# Patient Record
Sex: FEMALE | Race: WHITE | Employment: FULL TIME | URBAN - METROPOLITAN AREA
[De-identification: names, ages, dates, MRNs, and addresses within clinical notes are randomized per-mention and may not be internally consistent; named-entity substitution may affect disease eponyms.]

---

## 2017-01-06 ENCOUNTER — ANTICOAGULATION THERAPY VISIT (OUTPATIENT)
Dept: ANTICOAGULATION | Facility: CLINIC | Age: 37
End: 2017-01-06

## 2017-01-06 DIAGNOSIS — I82.409 DEEP VEIN THROMBOSIS (DVT) (H): ICD-10-CM

## 2017-01-06 DIAGNOSIS — Z79.01 LONG-TERM (CURRENT) USE OF ANTICOAGULANTS: ICD-10-CM

## 2017-01-06 DIAGNOSIS — I82.409 DEEP VEIN THROMBOSIS (DVT) (H): Primary | ICD-10-CM

## 2017-01-06 LAB — INR PPP: 1.6 (ref 0.86–1.14)

## 2017-01-06 PROCEDURE — 85610 PROTHROMBIN TIME: CPT | Performed by: INTERNAL MEDICINE

## 2017-01-06 PROCEDURE — 36416 COLLJ CAPILLARY BLOOD SPEC: CPT | Performed by: INTERNAL MEDICINE

## 2017-01-06 NOTE — PROGRESS NOTES
ANTICOAGULATION FOLLOW-UP CLINIC VISIT    Patient Name:  Benita Olivo  Date:  1/6/2017  Contact Type:  Telephone    SUBJECTIVE:     Patient Findings     Positives Diet Changes    Comments Pt is eating more greens and would like to continue this. She reports the 3.40 INR on 12/16 she was limiting her greens, but now thinks it better to go back to her diet which has lots of greens. Pt would like to go back to 10mg daily and to recheck in a week           OBJECTIVE    INR   Date Value Ref Range Status   01/06/2017 1.60* 0.86 - 1.14 Final     Comment:     This test is intended for monitoring Coumadin therapy.  Results are not   accurate   in patients with prolonged INR due to factor deficiency.         ASSESSMENT / PLAN  INR assessment SUB    Recheck INR In: 1 WEEK    INR Location Clinic      Anticoagulation Summary as of 1/6/2017     INR goal 2.0-3.0   Selected INR 1.60! (1/6/2017)   Maintenance plan 10 mg (5 mg x 2) every day   Full instructions 10 mg every day   Weekly total 70 mg   Plan last modified Noemi Morgan RN (1/6/2017)   Next INR check 1/13/2017   Priority INR   Target end date     Indications   Deep vein thrombosis (DVT) (H) [I82.409] [I82.409]  Long-term (current) use of anticoagulants [Z79.01] [Z79.01]  Homozygous Factor V Leiden mutation (H) (Resolved) [D68.52]         Anticoagulation Episode Summary     INR check location     Preferred lab     Send INR reminders to [facility]    Comments patient contact number [phone]   name Rigo. Prefers Fridays for lab days and as infrequently as possible.      Anticoagulation Care Providers     Provider Role Specialty Phone number    Reed Garcia MD Responsible Hematology [phone]            See the Encounter Report to view Anticoagulation Flowsheet and Dosing Calendar (Go to Encounters tab in chart review, and find the Anticoagulation Therapy Visit)    Spoke with Benita Morgan, TANGELA

## 2017-01-13 ENCOUNTER — ANTICOAGULATION THERAPY VISIT (OUTPATIENT)
Dept: ANTICOAGULATION | Facility: CLINIC | Age: 37
End: 2017-01-13

## 2017-01-13 DIAGNOSIS — Z79.01 LONG-TERM (CURRENT) USE OF ANTICOAGULANTS: ICD-10-CM

## 2017-01-13 DIAGNOSIS — I82.409 DEEP VEIN THROMBOSIS (DVT) (H): Primary | ICD-10-CM

## 2017-01-13 DIAGNOSIS — I82.409 DEEP VEIN THROMBOSIS (DVT) (H): ICD-10-CM

## 2017-01-13 LAB — INR PPP: 2.3 (ref 0.86–1.14)

## 2017-01-13 PROCEDURE — 85610 PROTHROMBIN TIME: CPT | Performed by: FAMILY MEDICINE

## 2017-01-13 PROCEDURE — 36416 COLLJ CAPILLARY BLOOD SPEC: CPT | Performed by: FAMILY MEDICINE

## 2017-01-13 NOTE — PROGRESS NOTES
ANTICOAGULATION FOLLOW-UP CLINIC VISIT    Patient Name:  Benita Olivo  Date:  1/13/2017  Contact Type:  Telephone    SUBJECTIVE:        OBJECTIVE    INR   Date Value Ref Range Status   01/13/2017 2.30* 0.86 - 1.14 Final     Comment:     This test is intended for monitoring Coumadin therapy.  Results are not   accurate   in patients with prolonged INR due to factor deficiency.         ASSESSMENT / PLAN  INR assessment THER    Recheck INR In: 2 WEEKS    INR Location Clinic      Anticoagulation Summary as of 1/13/2017     INR goal 2.0-3.0   Selected INR 2.30 (1/13/2017)   Maintenance plan 10 mg (5 mg x 2) every day   Full instructions 10 mg every day   Weekly total 70 mg   Plan last modified Noemi Morgan RN (1/6/2017)   Next INR check 1/27/2017   Priority INR   Target end date     Indications   Deep vein thrombosis (DVT) (H) [I82.409] [I82.409]  Long-term (current) use of anticoagulants [Z79.01] [Z79.01]  Homozygous Factor V Leiden mutation (H) (Resolved) [D68.52]         Anticoagulation Episode Summary     INR check location     Preferred lab     Send INR reminders to [facility]    Comments patient contact number [phone]   name Rigo. Prefers Fridays for lab days and as infrequently as possible.      Anticoagulation Care Providers     Provider Role Specialty Phone number    Reed Garcia MD Responsible Hematology [phone]            See the Encounter Report to view Anticoagulation Flowsheet and Dosing Calendar (Go to Encounters tab in chart review, and find the Anticoagulation Therapy Visit)    Left message for patient with results and dosing recommendations. Asked patient to call back to report any missed doses, falls, signs and symptoms of bleeding or clotting, any changes in health, medication, or diet. Asked patient to call back with any questions or concerns.     Noemi Morgan, TANGELA

## 2017-01-27 ENCOUNTER — ANTICOAGULATION THERAPY VISIT (OUTPATIENT)
Dept: ANTICOAGULATION | Facility: CLINIC | Age: 37
End: 2017-01-27

## 2017-01-27 DIAGNOSIS — Z79.01 LONG-TERM (CURRENT) USE OF ANTICOAGULANTS: ICD-10-CM

## 2017-01-27 DIAGNOSIS — I82.409 DEEP VEIN THROMBOSIS (DVT) (H): ICD-10-CM

## 2017-01-27 DIAGNOSIS — I82.409 DEEP VEIN THROMBOSIS (DVT) (H): Primary | ICD-10-CM

## 2017-01-27 LAB — INR PPP: 1.5 (ref 0.86–1.14)

## 2017-01-27 PROCEDURE — 36416 COLLJ CAPILLARY BLOOD SPEC: CPT | Performed by: INTERNAL MEDICINE

## 2017-01-27 PROCEDURE — 85610 PROTHROMBIN TIME: CPT | Performed by: INTERNAL MEDICINE

## 2017-01-27 NOTE — PROGRESS NOTES
ANTICOAGULATION FOLLOW-UP CLINIC VISIT    Patient Name:  Benita Olivo  Date:  1/27/2017  Contact Type:  Telephone    SUBJECTIVE:     Patient Findings     Positives Missed doses           OBJECTIVE    INR   Date Value Ref Range Status   01/27/2017 1.50* 0.86 - 1.14 Final     Comment:     This test is intended for monitoring Coumadin therapy.  Results are not   accurate   in patients with prolonged INR due to factor deficiency.         ASSESSMENT / PLAN  INR assessment SUB    Recheck INR In: 2 WEEKS    INR Location Clinic      Anticoagulation Summary as of 1/27/2017     INR goal 2.0-3.0   Selected INR 1.50! (1/27/2017)   Maintenance plan 10 mg (5 mg x 2) every day   Full instructions 1/27: 15 mg; Otherwise 10 mg every day   Weekly total 70 mg   Plan last modified Noemi Morgan RN (1/6/2017)   Next INR check 2/10/2017   Priority INR   Target end date     Indications   Deep vein thrombosis (DVT) (H) [I82.409] [I82.409]  Long-term (current) use of anticoagulants [Z79.01] [Z79.01]  Homozygous Factor V Leiden mutation (H) (Resolved) [D68.52]         Anticoagulation Episode Summary     INR check location     Preferred lab     Send [facility] reminders to [facility]    Comments patient contact number [phone]   name Rigo. Prefers Fridays for lab days and as infrequently as possible.      Anticoagulation Care Providers     Provider Role Specialty Phone number    Reed Garcia MD Responsible Hematology [phone]            See the Encounter Report to view Anticoagulation Flowsheet and Dosing Calendar (Go to Encounters tab in chart review, and find the Anticoagulation Therapy Visit)    Spoke with Benita.    Christy Santana RN

## 2017-02-07 DIAGNOSIS — I82.409 DEEP VEIN THROMBOSIS (DVT) (H): Primary | ICD-10-CM

## 2017-02-07 DIAGNOSIS — Z79.01 LONG-TERM (CURRENT) USE OF ANTICOAGULANTS: ICD-10-CM

## 2017-02-07 RX ORDER — WARFARIN SODIUM 5 MG/1
TABLET ORAL
Qty: 60 TABLET | Refills: 1 | Status: SHIPPED | OUTPATIENT
Start: 2017-02-07 | End: 2017-04-05

## 2017-02-10 ENCOUNTER — ANTICOAGULATION THERAPY VISIT (OUTPATIENT)
Dept: ANTICOAGULATION | Facility: CLINIC | Age: 37
End: 2017-02-10

## 2017-02-10 DIAGNOSIS — I82.409 DEEP VEIN THROMBOSIS (DVT) (H): ICD-10-CM

## 2017-02-10 DIAGNOSIS — I82.409 DEEP VEIN THROMBOSIS (DVT) (H): Primary | ICD-10-CM

## 2017-02-10 DIAGNOSIS — Z79.01 LONG-TERM (CURRENT) USE OF ANTICOAGULANTS: ICD-10-CM

## 2017-02-10 LAB — INR PPP: 1.8 (ref 0.86–1.14)

## 2017-02-10 PROCEDURE — 85610 PROTHROMBIN TIME: CPT | Performed by: INTERNAL MEDICINE

## 2017-02-10 PROCEDURE — 36416 COLLJ CAPILLARY BLOOD SPEC: CPT | Performed by: INTERNAL MEDICINE

## 2017-02-10 NOTE — PROGRESS NOTES
ANTICOAGULATION FOLLOW-UP CLINIC VISIT    Patient Name:  Benita Olivo  Date:  2/10/2017  Contact Type:  Telephone    SUBJECTIVE:     Patient Findings     Positives Missed doses           OBJECTIVE    INR   Date Value Ref Range Status   02/10/2017 1.80* 0.86 - 1.14 Final     Comment:     This test is intended for monitoring Coumadin therapy.  Results are not   accurate   in patients with prolonged INR due to factor deficiency.         ASSESSMENT / PLAN  INR assessment SUB    Recheck INR In: 1 WEEK    INR Location Clinic      Anticoagulation Summary as of 2/10/2017     INR goal 2.0-3.0   Selected INR 1.80! (2/10/2017)   Maintenance plan 15 mg (5 mg x 3) on Tue; 10 mg (5 mg x 2) all other days   Full instructions 2/10: 15 mg; Otherwise 15 mg on Tue; 10 mg all other days   Weekly total 75 mg   Plan last modified Thais Weiss, RN (2/10/2017)   Next INR check 2/17/2017   Priority INR   Target end date     Indications   Deep vein thrombosis (DVT) (H) [I82.409] [I82.409]  Long-term (current) use of anticoagulants [Z79.01] [Z79.01]  Homozygous Factor V Leiden mutation (H) (Resolved) [D68.52]         Anticoagulation Episode Summary     INR check location     Preferred lab     Send INR reminders to [facility]    Comments patient contact number [phone]   name Rigo. Prefers Fridays for lab days and as infrequently as possible.      Anticoagulation Care Providers     Provider Role Specialty Phone number    Reed Garcia MD Responsible Hematology [phone]            See the Encounter Report to view Anticoagulation Flowsheet and Dosing Calendar (Go to Encounters tab in chart review, and find the Anticoagulation Therapy Visit)    Spoke with Benita. She missed her dose on Monday, so took 15 mg on Tuesday. She is going to start a prenatal multivitamin today, so will give one larger dose during the coming week to account for the increase in vitamin K. She will check her INR in one  week.    Thais Weiss RN

## 2017-02-17 ENCOUNTER — ANTICOAGULATION THERAPY VISIT (OUTPATIENT)
Dept: ANTICOAGULATION | Facility: CLINIC | Age: 37
End: 2017-02-17

## 2017-02-17 DIAGNOSIS — Z79.01 LONG-TERM (CURRENT) USE OF ANTICOAGULANTS: ICD-10-CM

## 2017-02-17 DIAGNOSIS — I82.409 DEEP VEIN THROMBOSIS (DVT) (H): ICD-10-CM

## 2017-02-17 LAB — INR PPP: 2.4 (ref 0.86–1.14)

## 2017-02-17 PROCEDURE — 85610 PROTHROMBIN TIME: CPT | Performed by: INTERNAL MEDICINE

## 2017-02-17 PROCEDURE — 36416 COLLJ CAPILLARY BLOOD SPEC: CPT | Performed by: INTERNAL MEDICINE

## 2017-02-17 NOTE — PROGRESS NOTES
ANTICOAGULATION FOLLOW-UP CLINIC VISIT    Patient Name:  Benita Olivo  Date:  2/17/2017  Contact Type:  Telephone    SUBJECTIVE:     Patient Findings     Positives No Problem Findings           OBJECTIVE    INR   Date Value Ref Range Status   02/17/2017 2.40 (H) 0.86 - 1.14 Final     Comment:     This test is intended for monitoring Coumadin therapy.  Results are not   accurate   in patients with prolonged INR due to factor deficiency.         ASSESSMENT / PLAN  INR assessment THER    Recheck INR In: 2 WEEKS    INR Location Clinic      Anticoagulation Summary as of 2/17/2017     INR goal 2.0-3.0   Today's INR 2.40   Maintenance plan 15 mg (5 mg x 3) on Tue; 10 mg (5 mg x 2) all other days   Full instructions 15 mg on Tue; 10 mg all other days   Weekly total 75 mg   Plan last modified Thais Weiss RN (2/10/2017)   Next INR check 3/3/2017   Priority INR   Target end date     Indications   Deep vein thrombosis (DVT) (H) [I82.409] [I82.409]  Long-term (current) use of anticoagulants [Z79.01] [Z79.01]  Homozygous Factor V Leiden mutation (H) (Resolved) [D68.52]         Anticoagulation Episode Summary     INR check location     Preferred lab     [facility]    Comments patient contact number [phone]   name Rigo. Prefers Fridays for lab days and as infrequently as possible.      Anticoagulation Care Providers     Provider Role Specialty Phone number    Reed Garcia MD Responsible Hematology [phone]            See the Encounter Report to view Anticoagulation Flowsheet and Dosing Calendar (Go to Encounters tab in chart review, and find the Anticoagulation Therapy Visit)    Spoke with Benita.    Thais Weiss, RN

## 2017-03-03 ENCOUNTER — ANTICOAGULATION THERAPY VISIT (OUTPATIENT)
Dept: ANTICOAGULATION | Facility: CLINIC | Age: 37
End: 2017-03-03

## 2017-03-03 DIAGNOSIS — I82.409 DEEP VEIN THROMBOSIS (DVT) (H): ICD-10-CM

## 2017-03-03 DIAGNOSIS — Z79.01 LONG-TERM (CURRENT) USE OF ANTICOAGULANTS: ICD-10-CM

## 2017-03-03 LAB — INR PPP: 1.8 (ref 0.86–1.14)

## 2017-03-03 PROCEDURE — 36416 COLLJ CAPILLARY BLOOD SPEC: CPT | Performed by: FAMILY MEDICINE

## 2017-03-03 PROCEDURE — 85610 PROTHROMBIN TIME: CPT | Performed by: FAMILY MEDICINE

## 2017-03-03 NOTE — MR AVS SNAPSHOT
Benita Olivo   3/3/2017   Anticoagulation Therapy Visit    Description:  36 year old female   Provider:  Thais Weiss, RN   Department:  [facility]           INR as of 3/3/2017     Today's INR 1.80!      Anticoagulation Summary as of 3/3/2017     INR goal 2.0-3.0   Today's INR 1.80!   Full instructions 15 mg on Tue, Fri; 10 mg all other days   Next INR check 3/17/2017    Indications   Deep vein thrombosis (DVT) (H) [I82.409] [I82.409]  Long-term (current) use of anticoagulants [Z79.01] [Z79.01]  Homozygous Factor V Leiden mutation (H) (Resolved) [D68.52]         March 2017 Details    Sun Mon Tue Wed Thu Fri Sat        1               2               3      15 mg   See details      4      10 mg           5      10 mg         6      10 mg         7      15 mg         8      10 mg         9      10 mg         10      15 mg         11      10 mg           12      10 mg         13      10 mg         14      15 mg         15      10 mg         16      10 mg         17            18                 19               20               21               22               23               24               25                 26               27               28               29               30               31                 Date Details   03/03 This INR check       Date of next INR:  3/17/2017         How to take your warfarin dose     To take:  10 mg Take 2 of the 5 mg tablets.    To take:  15 mg Take 3 of the 5 mg tablets.

## 2017-03-03 NOTE — PROGRESS NOTES
ANTICOAGULATION FOLLOW-UP CLINIC VISIT    Patient Name:  Benita Olivo  Date:  3/3/2017  Contact Type:  Telephone    SUBJECTIVE:        OBJECTIVE    INR   Date Value Ref Range Status   03/03/2017 1.80 (H) 0.86 - 1.14 Final     Comment:     This test is intended for monitoring Coumadin therapy.  Results are not   accurate   in patients with prolonged INR due to factor deficiency.         ASSESSMENT / PLAN  INR assessment SUB    Recheck INR In: 2 WEEKS    INR Location Clinic      Anticoagulation Summary as of 3/3/2017     INR goal 2.0-3.0   Today's INR 1.80!   Maintenance plan 15 mg (5 mg x 3) on Tue, Fri; 10 mg (5 mg x 2) all other days   Full instructions 15 mg on Tue, Fri; 10 mg all other days   Weekly total 80 mg   Plan last modified Thais Weiss RN (3/3/2017)   Next INR check 3/17/2017   Priority INR   Target end date     Indications   Deep vein thrombosis (DVT) (H) [I82.409] [I82.409]  Long-term (current) use of anticoagulants [Z79.01] [Z79.01]  Homozygous Factor V Leiden mutation (H) (Resolved) [D68.52]         Anticoagulation Episode Summary     INR check location     Preferred lab     Send INR reminders to [facility]    Comments patient contact number [phone]   name Rigo. Prefers Fridays for lab days and as infrequently as possible.      Anticoagulation Care Providers     Provider Role Specialty Phone number    Reed Garcia MD Responsible Hematology [phone]            See the Encounter Report to view Anticoagulation Flowsheet and Dosing Calendar (Go to Encounters tab in chart review, and find the Anticoagulation Therapy Visit)    Left message with results and dosing recommendations. Asked patient to call back to report any missed doses, falls, signs and symptoms of bleeding or clotting, or any changes to health or diet.       Thais Weiss, TANGELA

## 2017-03-21 ENCOUNTER — ANTICOAGULATION THERAPY VISIT (OUTPATIENT)
Dept: ANTICOAGULATION | Facility: CLINIC | Age: 37
End: 2017-03-21

## 2017-03-21 DIAGNOSIS — I82.409 DEEP VEIN THROMBOSIS (DVT) (H): ICD-10-CM

## 2017-03-21 DIAGNOSIS — Z79.01 LONG-TERM (CURRENT) USE OF ANTICOAGULANTS: ICD-10-CM

## 2017-03-21 LAB — INR PPP: 2.4 (ref 0.86–1.14)

## 2017-03-21 PROCEDURE — 85610 PROTHROMBIN TIME: CPT | Performed by: FAMILY MEDICINE

## 2017-03-21 PROCEDURE — 36416 COLLJ CAPILLARY BLOOD SPEC: CPT | Performed by: FAMILY MEDICINE

## 2017-03-21 NOTE — MR AVS SNAPSHOT
Benita POSEY Geovani   3/21/2017   Anticoagulation Therapy Visit    Description:  36 year old female   Provider:  Noemi Morgan, RN   Department:  [facility]           INR as of 3/21/2017     Today's INR 2.40      Anticoagulation Summary as of 3/21/2017     INR goal 2.0-3.0   Today's INR 2.40   Full instructions 15 mg on Tue, Fri; 10 mg all other days   Next INR check 4/4/2017    Indications   Deep vein thrombosis (DVT) (H) [I82.409] [I82.409]  Long-term (current) use of anticoagulants [Z79.01] [Z79.01]  Homozygous Factor V Leiden mutation (H) (Resolved) [D68.52]         March 2017 Details    Sun Mon Tue Wed Thu Fri Sat        1               2               3               4                 5               6               7               8               9               10               11                 12               13               14               15               16               17               18                 19               20               21      15 mg   See details      22      10 mg         23      10 mg         24      15 mg         25      10 mg           26      10 mg         27      10 mg         28      15 mg         29      10 mg         30      10 mg         31      15 mg           Date Details   03/21 This INR check               How to take your warfarin dose     To take:  10 mg Take 2 of the 5 mg tablets.    To take:  15 mg Take 3 of the 5 mg tablets.           April 2017 Details    Sun Mon Tue Wed Thu Fri Sat           1      10 mg           2      10 mg         3      10 mg         4            5               6               7               8                 9               10               11               12               13               14               15                 16               17               18               19               20               21               22                 23               24               25               26               27                28               29                 30                      Date Details   No additional details    Date of next INR:  4/4/2017         How to take your warfarin dose     To take:  10 mg Take 2 of the 5 mg tablets.    To take:  15 mg Take 3 of the 5 mg tablets.

## 2017-03-21 NOTE — PROGRESS NOTES
ANTICOAGULATION FOLLOW-UP CLINIC VISIT    Patient Name:  Benita Olivo  Date:  3/21/2017  Contact Type:  Telephone    SUBJECTIVE:        OBJECTIVE    INR   Date Value Ref Range Status   03/21/2017 2.40 (H) 0.86 - 1.14 Final     Comment:     This test is intended for monitoring Coumadin therapy.  Results are not   accurate   in patients with prolonged INR due to factor deficiency.         ASSESSMENT / PLAN  INR assessment THER    Recheck INR In: 2 WEEKS    INR Location Clinic      Anticoagulation Summary as of 3/21/2017     INR goal 2.0-3.0   Today's INR 2.40   Maintenance plan 15 mg (5 mg x 3) on Tue, Fri; 10 mg (5 mg x 2) all other days   Full instructions 15 mg on Tue, Fri; 10 mg all other days   Weekly total 80 mg   Plan last modified Thais Weiss RN (3/3/2017)   Next INR check 4/4/2017   Priority INR   Target end date     Indications   Deep vein thrombosis (DVT) (H) [I82.409] [I82.409]  Long-term (current) use of anticoagulants [Z79.01] [Z79.01]  Homozygous Factor V Leiden mutation (H) (Resolved) [D68.52]         Anticoagulation Episode Summary     INR check location     Preferred lab     Send INR reminders to [facility]    Comments patient contact number [phone]   name Rigo. Prefers Fridays for lab days and as infrequently as possible.      Anticoagulation Care Providers     Provider Role Specialty Phone number    Reed Garcia MD Responsible Hematology [phone]            See the Encounter Report to view Anticoagulation Flowsheet and Dosing Calendar (Go to Encounters tab in chart review, and find the Anticoagulation Therapy Visit)    Left message for patient with results and dosing recommendations. Asked patient to call back to report any missed doses, falls, signs and symptoms of bleeding or clotting, any changes in health, medication, or diet. Asked patient to call back with any questions or concerns.     Noemi Morgan RN

## 2017-04-05 DIAGNOSIS — Z79.01 LONG-TERM (CURRENT) USE OF ANTICOAGULANTS: ICD-10-CM

## 2017-04-05 DIAGNOSIS — I82.409 DEEP VEIN THROMBOSIS (DVT) (H): ICD-10-CM

## 2017-04-05 RX ORDER — WARFARIN SODIUM 5 MG/1
TABLET ORAL
Qty: 70 TABLET | Refills: 5 | Status: SHIPPED | OUTPATIENT
Start: 2017-04-05 | End: 2017-11-15

## 2017-04-07 ENCOUNTER — ANTICOAGULATION THERAPY VISIT (OUTPATIENT)
Dept: ANTICOAGULATION | Facility: CLINIC | Age: 37
End: 2017-04-07

## 2017-04-07 DIAGNOSIS — Z79.01 LONG-TERM (CURRENT) USE OF ANTICOAGULANTS: ICD-10-CM

## 2017-04-07 DIAGNOSIS — I82.409 DEEP VEIN THROMBOSIS (DVT) (H): ICD-10-CM

## 2017-04-07 LAB — INR PPP: 2.6 (ref 0.86–1.14)

## 2017-04-07 PROCEDURE — 85610 PROTHROMBIN TIME: CPT | Performed by: FAMILY MEDICINE

## 2017-04-07 PROCEDURE — 36416 COLLJ CAPILLARY BLOOD SPEC: CPT | Performed by: FAMILY MEDICINE

## 2017-04-07 NOTE — PROGRESS NOTES
ANTICOAGULATION FOLLOW-UP CLINIC VISIT    Patient Name:  Benita Olivo  Date:  4/7/2017  Contact Type:  Telephone    SUBJECTIVE:     Patient Findings     Positives Change in medications    Comments Pt just started Provera on 4/5 Micromedex says a moderate interaction with Warfarin. Will have pt come in two weeks again to see if there is any changes or not.            OBJECTIVE    INR   Date Value Ref Range Status   04/07/2017 2.60 (H) 0.86 - 1.14 Final     Comment:     This test is intended for monitoring Coumadin therapy.  Results are not   accurate   in patients with prolonged INR due to factor deficiency.         ASSESSMENT / PLAN  INR assessment THER    Recheck INR In: 2 WEEKS    INR Location Clinic      Anticoagulation Summary as of 4/7/2017     INR goal 2.0-3.0   Today's INR 2.60   Maintenance plan 15 mg (5 mg x 3) on Tue, Fri; 10 mg (5 mg x 2) all other days   Full instructions 15 mg on Tue, Fri; 10 mg all other days   Weekly total 80 mg   Plan last modified Thais Weiss RN (3/3/2017)   Next INR check 4/21/2017   Priority INR   Target end date     Indications   Deep vein thrombosis (DVT) (H) [I82.409] [I82.409]  Long-term (current) use of anticoagulants [Z79.01] [Z79.01]  Homozygous Factor V Leiden mutation (H) (Resolved) [D68.52]         Anticoagulation Episode Summary     INR check location     Preferred lab     Send INR reminders to [facility]    Comments patient contact number [phone]   name Rigo. Prefers Fridays for lab days and as infrequently as possible.      Anticoagulation Care Providers     Provider Role Specialty Phone number    Reed Garcia MD Responsible Hematology [phone]            See the Encounter Report to view Anticoagulation Flowsheet and Dosing Calendar (Go to Encounters tab in chart review, and find the Anticoagulation Therapy Visit)    Spoke with Benita Morgan RN

## 2017-04-07 NOTE — MR AVS SNAPSHOT
Benita POSEY Olukenyatta   4/7/2017   Anticoagulation Therapy Visit    Description:  36 year old female   Provider:  Noemi Morgan, RN   Department:  [facility]           INR as of 4/7/2017     Today's INR 2.60      Anticoagulation Summary as of 4/7/2017     INR goal 2.0-3.0   Today's INR 2.60   Full instructions 15 mg on Tue, Fri; 10 mg all other days   Next INR check 4/21/2017    Indications   Deep vein thrombosis (DVT) (H) [I82.409] [I82.409]  Long-term (current) use of anticoagulants [Z79.01] [Z79.01]  Homozygous Factor V Leiden mutation (H) (Resolved) [D68.52]         April 2017 Details    Sun Mon Tue Wed Thu Fri Sat           1                 2               3               4               5               6               7      15 mg   See details      8      10 mg           9      10 mg         10      10 mg         11      15 mg         12      10 mg         13      10 mg         14      15 mg         15      10 mg           16      10 mg         17      10 mg         18      15 mg         19      10 mg         20      10 mg         21            22                 23               24               25               26               27               28               29                 30                      Date Details   04/07 This INR check       Date of next INR:  4/21/2017         How to take your warfarin dose     To take:  10 mg Take 2 of the 5 mg tablets.    To take:  15 mg Take 3 of the 5 mg tablets.

## 2017-04-14 ENCOUNTER — TRANSFERRED RECORDS (OUTPATIENT)
Dept: HEALTH INFORMATION MANAGEMENT | Facility: CLINIC | Age: 37
End: 2017-04-14

## 2017-04-19 ENCOUNTER — TELEPHONE (OUTPATIENT)
Dept: HEMATOLOGY | Facility: CLINIC | Age: 37
End: 2017-04-19

## 2017-04-19 NOTE — TELEPHONE ENCOUNTER
The Center received a call from staff (Luzmaria Matias RN) at [facility] regarding Mrs. Olivo, a 36 year old woman with history of recurrent UE DVT and pulmonary embolism who is homozygote for Factor V Leiden on long term anticoagulation with Coumadin. The plan is to proceed with FET. To facilitate the process she will begin estrogen at the beginning of her next menstrual cycle and continue with estrogen in hopes of establishing a pregnancy.   I spoke with Dr. Garcia regarding the plan and as long as she is therapeutic on Coumadin (INR target range 2-3) she can proceed with that plan. Once pregnancy is confirmed then anticoagulation should be changed to enoxaparin at 1 mg/kg every 12 hours subcutaneously. After starting enoxaparin a heparin Xa level should be checked (after 5 doses ) for assurance that she is therapeutic.    I called Mrs. Olivo to discuss the plan. She agreed to have her INR checked 2 days prior to beginning the supplemental estrogen and weekly thereafter for the duration of the increased estrogen exposure for assurance that her anticoagulation remains therapeutic on the estrogen. She is aware of the need to change anticoagulants once pregnancy is confirmed. She will call the Center and let us know. We wished her success on her plan.

## 2017-04-21 ENCOUNTER — ANTICOAGULATION THERAPY VISIT (OUTPATIENT)
Dept: ANTICOAGULATION | Facility: CLINIC | Age: 37
End: 2017-04-21

## 2017-04-21 DIAGNOSIS — Z79.01 LONG-TERM (CURRENT) USE OF ANTICOAGULANTS: ICD-10-CM

## 2017-04-21 DIAGNOSIS — I82.409 DEEP VEIN THROMBOSIS (DVT) (H): ICD-10-CM

## 2017-04-21 LAB — INR PPP: 2.3 (ref 0.86–1.14)

## 2017-04-21 PROCEDURE — 36416 COLLJ CAPILLARY BLOOD SPEC: CPT | Performed by: INTERNAL MEDICINE

## 2017-04-21 PROCEDURE — 85610 PROTHROMBIN TIME: CPT | Performed by: INTERNAL MEDICINE

## 2017-04-21 NOTE — PROGRESS NOTES
ANTICOAGULATION FOLLOW-UP CLINIC VISIT    Patient Name:  Benita Olivo  Date:  4/21/2017  Contact Type:  Telephone    SUBJECTIVE:     Patient Findings     Positives Change in medications    Comments Pt is going to start estrogen pill 4/27 in regards to starting FET. Per Dr Garcia pt to test INR once a week and to keep INR 2.00-3.00. Once pregnant will need to stop Warfarin. This info is documented in Ann Kohli. RN note 4/19/2017. Pt communicated this too           OBJECTIVE    INR   Date Value Ref Range Status   04/21/2017 2.30 (H) 0.86 - 1.14 Final     Comment:     This test is intended for monitoring Coumadin therapy.  Results are not   accurate   in patients with prolonged INR due to factor deficiency.         ASSESSMENT / PLAN  INR assessment THER    Recheck INR In: 4 DAYS    INR Location Clinic      Anticoagulation Summary as of 4/21/2017     INR goal 2.0-3.0   Today's INR 2.30   Maintenance plan 15 mg (5 mg x 3) on Tue, Fri; 10 mg (5 mg x 2) all other days   Full instructions 15 mg on Tue, Fri; 10 mg all other days   Weekly total 80 mg   Plan last modified Thais Weiss, RN (3/3/2017)   Next INR check 4/25/2017   Priority INR   Target end date     Indications   Deep vein thrombosis (DVT) (H) [I82.409] [I82.409]  Long-term (current) use of anticoagulants [Z79.01] [Z79.01]  Homozygous Factor V Leiden mutation (H) (Resolved) [D68.52]         Anticoagulation Episode Summary     INR check location     Preferred lab     Send INR reminders to [facility]    Comments patient contact number [phone]   name Rigo. Prefers Fridays for lab days and as infrequently as possible.      Anticoagulation Care Providers     Provider Role Specialty Phone number    Reed Garcia MD Responsible Hematology [phone]            See the Encounter Report to view Anticoagulation Flowsheet and Dosing Calendar (Go to Encounters tab in chart review, and find the Anticoagulation Therapy  Visit)    Spoke with patient. Gave them their lab results and new warfarin recommendation.  No changes in health, medication, or diet. No missed doses, no falls. No signs or symptoms of bleed or clotting.    Noemi Morgan RN

## 2017-04-21 NOTE — MR AVS SNAPSHOT
Benita POSEY Geovani   4/21/2017   Anticoagulation Therapy Visit    Description:  36 year old female   Provider:  Noemi Morgan, RN   Department:  [facility]           INR as of 4/21/2017     Today's INR 2.30      Anticoagulation Summary as of 4/21/2017     INR goal 2.0-3.0   Today's INR 2.30   Full instructions 15 mg on Tue, Fri; 10 mg all other days   Next INR check 4/25/2017    Indications   Deep vein thrombosis (DVT) (H) [I82.409] [I82.409]  Long-term (current) use of anticoagulants [Z79.01] [Z79.01]  Homozygous Factor V Leiden mutation (H) (Resolved) [D68.52]         April 2017 Details    Sun Mon Tue Wed Thu Fri Sat           1                 2               3               4               5               6               7               8                 9               10               11               12               13               14               15                 16               17               18               19               20               21      15 mg   See details      22      10 mg           23      10 mg         24      10 mg         25            26               27               28               29                 30                      Date Details   04/21 This INR check       Date of next INR:  4/25/2017         How to take your warfarin dose     To take:  10 mg Take 2 of the 5 mg tablets.    To take:  15 mg Take 3 of the 5 mg tablets.

## 2017-04-25 ENCOUNTER — ANTICOAGULATION THERAPY VISIT (OUTPATIENT)
Dept: ANTICOAGULATION | Facility: CLINIC | Age: 37
End: 2017-04-25

## 2017-04-25 DIAGNOSIS — Z79.01 LONG-TERM (CURRENT) USE OF ANTICOAGULANTS: ICD-10-CM

## 2017-04-25 DIAGNOSIS — I82.409 DEEP VEIN THROMBOSIS (DVT) (H): ICD-10-CM

## 2017-04-25 LAB — INR PPP: 2.3 (ref 0.86–1.14)

## 2017-04-25 PROCEDURE — 36416 COLLJ CAPILLARY BLOOD SPEC: CPT | Performed by: FAMILY MEDICINE

## 2017-04-25 PROCEDURE — 85610 PROTHROMBIN TIME: CPT | Performed by: FAMILY MEDICINE

## 2017-04-25 NOTE — PROGRESS NOTES
ANTICOAGULATION FOLLOW-UP CLINIC VISIT    Patient Name:  Benita Olivo  Date:  4/25/2017  Contact Type:  Telephone    SUBJECTIVE:     Patient Findings     Comments INR's will be done weekly until patient is pregnant.           OBJECTIVE    INR   Date Value Ref Range Status   04/25/2017 2.30 (H) 0.86 - 1.14 Final     Comment:     This test is intended for monitoring Coumadin therapy.  Results are not   accurate   in patients with prolonged INR due to factor deficiency.         ASSESSMENT / PLAN  INR assessment THER    Recheck INR In: 1 WEEK    INR Location Clinic      Anticoagulation Summary as of 4/25/2017     INR goal 2.0-3.0   Today's INR 2.30   Maintenance plan 15 mg (5 mg x 3) on Tue, Fri; 10 mg (5 mg x 2) all other days   Full instructions 15 mg on Tue, Fri; 10 mg all other days   Weekly total 80 mg   No change documented Christy Santana RN   Plan last modified Thais Weiss RN (3/3/2017)   Next INR check 5/2/2017   Priority INR   Target end date     Indications   Deep vein thrombosis (DVT) (H) [I82.409] [I82.409]  Long-term (current) use of anticoagulants [Z79.01] [Z79.01]  Homozygous Factor V Leiden mutation (H) (Resolved) [D68.52]         Anticoagulation Episode Summary     INR check location     Preferred lab     Send INR reminders to [facility]    Comments patient contact number [phone]   name Rigo. Prefers Fridays for lab days and as infrequently as possible.      Anticoagulation Care Providers     Provider Role Specialty Phone number    Reed Garcia MD Responsible Hematology [phone]            See the Encounter Report to view Anticoagulation Flowsheet and Dosing Calendar (Go to Encounters tab in chart review, and find the Anticoagulation Therapy Visit)    Left message for patient with results and dosing recommendations. Asked patient to call back to report any missed doses, falls, signs and symptoms of bleeding or clotting, any changes in health,  medication, or diet. Asked patient to call back with any questions or concerns.     Christy Santana RN

## 2017-04-25 NOTE — MR AVS SNAPSHOT
Benita TATY Olivo   4/25/2017   Anticoagulation Therapy Visit    Description:  36 year old female   Provider:  Christy Santana, RN   Department:  [facility]           INR as of 4/25/2017     Today's INR 2.30      Anticoagulation Summary as of 4/25/2017     INR goal 2.0-3.0   Today's INR 2.30   Full instructions 15 mg on Tue, Fri; 10 mg all other days   Next INR check 5/2/2017    Indications   Deep vein thrombosis (DVT) (H) [I82.409] [I82.409]  Long-term (current) use of anticoagulants [Z79.01] [Z79.01]  Homozygous Factor V Leiden mutation (H) (Resolved) [D68.52]         April 2017 Details    Sun Mon Tue Wed Thu Fri Sat           1                 2               3               4               5               6               7               8                 9               10               11               12               13               14               15                 16               17               18               19               20               21               22                 23               24               25      15 mg   See details      26      10 mg         27      10 mg         28      15 mg         29      10 mg           30      10 mg                Date Details   04/25 This INR check               How to take your warfarin dose     To take:  10 mg Take 2 of the 5 mg tablets.    To take:  15 mg Take 3 of the 5 mg tablets.           May 2017 Details    Sun Mon Tue Wed Thu Fri Sat      1      10 mg         2            3               4               5               6                 7               8               9               10               11               12               13                 14               15               16               17               18               19               20                 21               22               23               24               25               26               27                 28               29               30                31                   Date Details   No additional details    Date of next INR:  5/2/2017         How to take your warfarin dose     To take:  10 mg Take 2 of the 5 mg tablets.    To take:  15 mg Take 3 of the 5 mg tablets.

## 2017-05-01 ENCOUNTER — ANTICOAGULATION THERAPY VISIT (OUTPATIENT)
Dept: ANTICOAGULATION | Facility: CLINIC | Age: 37
End: 2017-05-01

## 2017-05-01 DIAGNOSIS — Z79.01 LONG-TERM (CURRENT) USE OF ANTICOAGULANTS: ICD-10-CM

## 2017-05-01 DIAGNOSIS — I82.409 DEEP VEIN THROMBOSIS (DVT) (H): ICD-10-CM

## 2017-05-01 LAB — INR PPP: 3.3 (ref 0.86–1.14)

## 2017-05-01 PROCEDURE — 36416 COLLJ CAPILLARY BLOOD SPEC: CPT | Performed by: INTERNAL MEDICINE

## 2017-05-01 PROCEDURE — 85610 PROTHROMBIN TIME: CPT | Performed by: INTERNAL MEDICINE

## 2017-05-01 NOTE — PROGRESS NOTES
ANTICOAGULATION FOLLOW-UP CLINIC VISIT    Patient Name:  Benita Olivo  Date:  5/1/2017  Contact Type:  Telephone    SUBJECTIVE:     Patient Findings     Positives Med error    Comments Pt suspects she took 15 mg three days in the past week           OBJECTIVE    INR   Date Value Ref Range Status   05/01/2017 3.30 (H) 0.86 - 1.14 Final     Comment:     This test is intended for monitoring Coumadin therapy.  Results are not   accurate   in patients with prolonged INR due to factor deficiency.         ASSESSMENT / PLAN  INR assessment SUPRA    Recheck INR In: 1 WEEK    INR Location Clinic      Anticoagulation Summary as of 5/1/2017     INR goal 2.0-3.0   Today's INR 3.30!   Maintenance plan 15 mg (5 mg x 3) on Tue, Fri; 10 mg (5 mg x 2) all other days   Full instructions 5/1: 7.5 mg; Otherwise 15 mg on Tue, Fri; 10 mg all other days   Weekly total 80 mg   Plan last modified Thais Weiss RN (3/3/2017)   Next INR check 5/8/2017   Priority INR   Target end date     Indications   Deep vein thrombosis (DVT) (H) [I82.409] [I82.409]  Long-term (current) use of anticoagulants [Z79.01] [Z79.01]  Homozygous Factor V Leiden mutation (H) (Resolved) [D68.52]         Anticoagulation Episode Summary     INR check location     Preferred lab     Send INR reminders to [facility]    Comments patient contact number [phone]   name Rigo. Prefers Fridays for lab days and as infrequently as possible.      Anticoagulation Care Providers     Provider Role Specialty Phone number    Reed aGrcia MD Responsible Hematology [phone]            See the Encounter Report to view Anticoagulation Flowsheet and Dosing Calendar (Go to Encounters tab in chart review, and find the Anticoagulation Therapy Visit)    Spoke with Benita.  One time dose adjustment done today only to compensate for med error last week.     Leyla Navarro RN

## 2017-05-01 NOTE — MR AVS SNAPSHOT
Benita POSEY Geovani   5/1/2017   Anticoagulation Therapy Visit    Description:  36 year old female   Provider:  Leyla Navarro, RN   Department:  [facility]           INR as of 5/1/2017     Today's INR 3.30!      Anticoagulation Summary as of 5/1/2017     INR goal 2.0-3.0   Today's INR 3.30!   Full instructions 5/1: 7.5 mg; Otherwise 15 mg on Tue, Fri; 10 mg all other days   Next INR check 5/8/2017    Indications   Deep vein thrombosis (DVT) (H) [I82.409] [I82.409]  Long-term (current) use of anticoagulants [Z79.01] [Z79.01]  Homozygous Factor V Leiden mutation (H) (Resolved) [D68.52]         May 2017 Details    Sun Mon Tue Wed Thu Fri Sat      1      7.5 mg   See details      2      15 mg         3      10 mg         4      10 mg         5      15 mg         6      10 mg           7      10 mg         8            9               10               11               12               13                 14               15               16               17               18               19               20                 21               22               23               24               25               26               27                 28               29               30               31                   Date Details   05/01 This INR check       Date of next INR:  5/8/2017         How to take your warfarin dose     To take:  7.5 mg Take 1.5 of the 5 mg tablets.    To take:  10 mg Take 2 of the 5 mg tablets.    To take:  15 mg Take 3 of the 5 mg tablets.

## 2017-05-08 ENCOUNTER — ANTICOAGULATION THERAPY VISIT (OUTPATIENT)
Dept: ANTICOAGULATION | Facility: CLINIC | Age: 37
End: 2017-05-08

## 2017-05-08 DIAGNOSIS — Z79.01 LONG-TERM (CURRENT) USE OF ANTICOAGULANTS: ICD-10-CM

## 2017-05-08 DIAGNOSIS — I82.409 DEEP VEIN THROMBOSIS (DVT) (H): ICD-10-CM

## 2017-05-08 LAB — INR PPP: 2.9 (ref 0.86–1.14)

## 2017-05-08 PROCEDURE — 85610 PROTHROMBIN TIME: CPT | Performed by: FAMILY MEDICINE

## 2017-05-08 PROCEDURE — 36416 COLLJ CAPILLARY BLOOD SPEC: CPT | Performed by: FAMILY MEDICINE

## 2017-05-08 NOTE — MR AVS SNAPSHOT
Benita POSEY Olukenyatta   5/8/2017   Anticoagulation Therapy Visit    Description:  36 year old female   Provider:  Thais Deng RN   Department:  [facility]           INR as of 5/8/2017     Today's INR 2.90      Anticoagulation Summary as of 5/8/2017     INR goal 2.0-3.0   Today's INR 2.90   Full instructions 15 mg on Tue, Fri; 10 mg all other days   Next INR check 5/15/2017    Indications   Deep vein thrombosis (DVT) (H) [I82.409] [I82.409]  Long-term (current) use of anticoagulants [Z79.01] [Z79.01]  Homozygous Factor V Leiden mutation (H) (Resolved) [D68.52]         May 2017 Details    Sun Mon Tue Wed Thu Fri Sat      1               2               3               4               5               6                 7               8      10 mg   See details      9      15 mg         10      10 mg         11      10 mg         12      15 mg         13      10 mg           14      10 mg         15            16               17               18               19               20                 21               22               23               24               25               26               27                 28               29               30               31                   Date Details   05/08 This INR check       Date of next INR:  5/15/2017         How to take your warfarin dose     To take:  10 mg Take 2 of the 5 mg tablets.    To take:  15 mg Take 3 of the 5 mg tablets.

## 2017-05-08 NOTE — PROGRESS NOTES
ANTICOAGULATION FOLLOW-UP CLINIC VISIT    Patient Name:  Benita Olivo  Date:  5/8/2017  Contact Type:  Telephone    SUBJECTIVE:     Patient Findings     Positives No Problem Findings           OBJECTIVE    INR   Date Value Ref Range Status   05/08/2017 2.90 (H) 0.86 - 1.14 Final     Comment:     This test is intended for monitoring Coumadin therapy.  Results are not   accurate   in patients with prolonged INR due to factor deficiency.         ASSESSMENT / PLAN  INR assessment THER    Recheck INR In: 1 WEEK    INR Location Clinic      Anticoagulation Summary as of 5/8/2017     INR goal 2.0-3.0   Today's INR 2.90   Maintenance plan 15 mg (5 mg x 3) on Tue, Fri; 10 mg (5 mg x 2) all other days   Full instructions 15 mg on Tue, Fri; 10 mg all other days   Weekly total 80 mg   No change documented Thais Deng RN   Plan last modified Thais Weiss RN (3/3/2017)   Next INR check 5/15/2017   Priority INR   Target end date     Indications   Deep vein thrombosis (DVT) (H) [I82.409] [I82.409]  Long-term (current) use of anticoagulants [Z79.01] [Z79.01]  Homozygous Factor V Leiden mutation (H) (Resolved) [D68.52]         Anticoagulation Episode Summary     INR check location     Preferred lab     Send INR remind[facility] to [facility]    Comments patient contact number [phone]   name Rigo. Prefers Fridays for lab days and as infrequently as possible.      Anticoagulation Care Providers     Provider Role Specialty Phone number    Reed Garcia MD Responsible Hematology [phone]            See the Encounter Report to view Anticoagulation Flowsheet and Dosing Calendar (Go to Encounters tab in chart review, and find the Anticoagulation Therapy Visit)    Spoke with Benita.  She reports no changes in health, diet, medications.  She has labs scheduled in one week, so will check INR at that time.      Thais Deng, TANGELA

## 2017-05-15 ENCOUNTER — ANTICOAGULATION THERAPY VISIT (OUTPATIENT)
Dept: ANTICOAGULATION | Facility: CLINIC | Age: 37
End: 2017-05-15

## 2017-05-15 DIAGNOSIS — Z79.01 LONG-TERM (CURRENT) USE OF ANTICOAGULANTS: ICD-10-CM

## 2017-05-15 DIAGNOSIS — I82.409 DEEP VEIN THROMBOSIS (DVT) (H): ICD-10-CM

## 2017-05-15 LAB — INR PPP: 2.1 (ref 0.86–1.14)

## 2017-05-15 PROCEDURE — 36415 COLL VENOUS BLD VENIPUNCTURE: CPT | Performed by: INTERNAL MEDICINE

## 2017-05-15 PROCEDURE — 85610 PROTHROMBIN TIME: CPT | Performed by: INTERNAL MEDICINE

## 2017-05-15 NOTE — MR AVS SNAPSHOT
Benita POSEY Geovain   5/15/2017   Anticoagulation Therapy Visit    Description:  36 year old female   Provider:  Olga Young RN   Department:  [facility]           INR as of 5/15/2017     Today's INR 2.10      Anticoagulation Summary as of 5/15/2017     INR goal 2.0-3.0   Today's INR 2.10   Full instructions 15 mg on Tue, Fri; 10 mg all other days   Next INR check 5/30/2017    Indications   Deep vein thrombosis (DVT) (H) [I82.409] [I82.409]  Long-term (current) use of anticoagulants [Z79.01] [Z79.01]  Homozygous Factor V Leiden mutation (H) (Resolved) [D68.52]         May 2017 Details    Sun Mon Tue Wed Thu Fri Sat      1               2               3               4               5               6                 7               8               9               10               11               12               13                 14               15      10 mg   See details      16      15 mg         17      10 mg         18      10 mg         19      15 mg         20      10 mg           21      10 mg         22      10 mg         23      15 mg         24      10 mg         25      10 mg         26      15 mg         27      10 mg           28      10 mg         29      10 mg         30            31                   Date Details   05/15 This INR check       Date of next INR:  5/30/2017         How to take your warfarin dose     To take:  10 mg Take 2 of the 5 mg tablets.    To take:  15 mg Take 3 of the 5 mg tablets.

## 2017-05-15 NOTE — PROGRESS NOTES
ANTICOAGULATION FOLLOW-UP CLINIC VISIT    Patient Name:  Beniat Olivo  Date:  5/15/2017  Contact Type:  Telephone    SUBJECTIVE:     Patient Findings     Positives No Problem Findings           OBJECTIVE    INR   Date Value Ref Range Status   05/15/2017 2.10 (H) 0.86 - 1.14 Final     Comment:     This test is intended for monitoring Coumadin therapy.  Results are not   accurate   in patients with prolonged INR due to factor deficiency.         ASSESSMENT / PLAN  INR assessment THER    Recheck INR In: 2 WEEKS    INR Location Clinic      Anticoagulation Summary as of 5/15/2017     INR goal 2.0-3.0   Today's INR 2.10   Maintenance plan 15 mg (5 mg x 3) on Tue, Fri; 10 mg (5 mg x 2) all other days   Full instructions 15 mg on Tue, Fri; 10 mg all other days   Weekly total 80 mg   Plan last modified Thais Weiss RN (3/3/2017)   Next INR check 5/30/2017   Priority INR   Target end date     Indications   Deep vein thrombosis (DVT) (H) [I82.409] [I82.409]  Long-term (current) use of anticoagulants [Z79.01] [Z79.01]  Homozygous Factor V Leiden mutation (H) (Resolved) [D68.52]         Anticoagulation Episode Summary     INR check location     Preferred lab     Send INR reminders to [facility]    Comments patient contact number [phone]   name Rigo. Prefers Fridays for lab days and as infrequently as possible.      Anticoagulation Care Providers     Provider Role Specialty Phone number    Reed Garcia MD Responsible Hematology [phone]            See the Encounter Report to view Anticoagulation Flowsheet and Dosing Calendar (Go to Encounters tab in chart review, and find the Anticoagulation Therapy Visit)  Spoke with patient.    Olga Young RN

## 2017-05-30 ENCOUNTER — ANTICOAGULATION THERAPY VISIT (OUTPATIENT)
Dept: ANTICOAGULATION | Facility: CLINIC | Age: 37
End: 2017-05-30

## 2017-05-30 DIAGNOSIS — Z79.01 LONG-TERM (CURRENT) USE OF ANTICOAGULANTS: ICD-10-CM

## 2017-05-30 DIAGNOSIS — I82.409 DEEP VEIN THROMBOSIS (DVT) (H): ICD-10-CM

## 2017-05-30 LAB — INR PPP: 5.98 (ref 0.86–1.14)

## 2017-05-30 PROCEDURE — 36416 COLLJ CAPILLARY BLOOD SPEC: CPT | Performed by: INTERNAL MEDICINE

## 2017-05-30 PROCEDURE — 85610 PROTHROMBIN TIME: CPT | Performed by: INTERNAL MEDICINE

## 2017-05-30 NOTE — PROGRESS NOTES
ANTICOAGULATION FOLLOW-UP CLINIC VISIT    Patient Name:  Benita Olivo  Date:  5/30/2017  Contact Type:  Telephone    SUBJECTIVE:     Patient Findings     Positives Antibiotic use or infection    Comments Pt reports starting flagyl and doxicycline approx. 2 weeks ago - this is related to treatment for infertility.  Additional information is requested, but pt states she is not in a place where she can speak freely.  It is unknown how long these meds will continue.  Strong anticoagulation precautions are reviewed with pt., including seeking medical care if she develops a severe headache or stomach ache.  This dosing plan was discussed with [facility].            OBJECTIVE    INR   Date Value Ref Range Status   05/30/2017 5.98 (HH) 0.86 - 1.14 Final     Comment:     Critical Value called to and read back by  STEVAN AT 1019 ON 05.30.2017 BY 3147.         ASSESSMENT / PLAN  INR assessment SUPRA    Recheck INR In: 2 DAYS    INR Location Clinic      Anticoagulation Summary as of 5/30/2017     INR goal 2.0-3.0   Today's INR 5.98!   Maintenance plan 15 mg (5 mg x 3) on Tue, Fri; 10 mg (5 mg x 2) all other days   Full instructions 5/30: 5 mg; 5/31: 5 mg; Otherwise 15 mg on Tue, Fri; 10 mg all other days   Weekly total 80 mg   Plan last modified Thais Weiss RN (3/3/2017)   Next INR check 6/1/2017   Priority INR   Target end date     Indications   Deep vein thrombosis (DVT) (H) [I82.409] [I82.409]  Long-term (current) use of anticoagulants [Z79.01] [Z79.01]  Homozygous Factor V Leiden mutation (H) (Resolved) [D68.52]         Anticoagulation Episode Summary     INR check location     Preferred lab     Send INR reminders to [facility]    Comments patient contact number [phone]   name Rigo. Prefers Fridays for lab days and as infrequently as possible.      Anticoagulation Care Providers     Provider Role Specialty Phone number    Reed Garcia MD Responsible Hematology [phone]            See  the Encounter Report to view Anticoagulation Flowsheet and Dosing Calendar (Go to Encounters tab in chart review, and find the Anticoagulation Therapy Visit)    INR result is called in by nurse Mejia from [facility]    Leyla Navarro RN

## 2017-05-30 NOTE — MR AVS SNAPSHOT
Benita TATY Olivo   5/30/2017   Anticoagulation Therapy Visit    Description:  36 year old female   Provider:  Leyla Navarro, RN   Department:  [facility]           INR as of 5/30/2017     Today's INR 5.98!      Anticoagulation Summary as of 5/30/2017     INR goal 2.0-3.0   Today's INR 5.98!   Full instructions 5/30: 5 mg; 5/31: 5 mg; Otherwise 15 mg on Tue, Fri; 10 mg all other days   Next INR check 6/1/2017    Indications   Deep vein thrombosis (DVT) (H) [I82.409] [I82.409]  Long-term (current) use of anticoagulants [Z79.01] [Z79.01]  Homozygous Factor V Leiden mutation (H) (Resolved) [D68.52]         May 2017 Details    Sun Mon Tue Wed Thu Fri Sat      1               2               3               4               5               6                 7               8               9               10               11               12               13                 14               15               16               17               18               19               20                 21               22               23               24               25               26               27                 28               29               30      5 mg   See details      31      5 mg             Date Details   05/30 This INR check               How to take your warfarin dose     To take:  5 mg Take 1 of the 5 mg tablets.           June 2017 Details    Sun Mon Tue Wed Thu Fri Sat         1            2               3                 4               5               6               7               8               9               10                 11               12               13               14               15               16               17                 18               19               20               21               22               23               24                 25               26               27               28               29               30                 Date Details   No  additional details    Date of next INR:  6/1/2017         How to take your warfarin dose     To take:  10 mg Take 2 of the 5 mg tablets.

## 2017-06-01 ENCOUNTER — TELEPHONE (OUTPATIENT)
Dept: HEMATOLOGY | Facility: CLINIC | Age: 37
End: 2017-06-01

## 2017-06-01 ENCOUNTER — ANTICOAGULATION THERAPY VISIT (OUTPATIENT)
Dept: ANTICOAGULATION | Facility: CLINIC | Age: 37
End: 2017-06-01

## 2017-06-01 DIAGNOSIS — Z79.01 LONG-TERM (CURRENT) USE OF ANTICOAGULANTS: ICD-10-CM

## 2017-06-01 DIAGNOSIS — I82.409 DEEP VEIN THROMBOSIS (DVT) (H): ICD-10-CM

## 2017-06-01 LAB — INR PPP: 6.1 (ref 0.86–1.14)

## 2017-06-01 PROCEDURE — 85610 PROTHROMBIN TIME: CPT | Performed by: INTERNAL MEDICINE

## 2017-06-01 PROCEDURE — 36415 COLL VENOUS BLD VENIPUNCTURE: CPT | Performed by: INTERNAL MEDICINE

## 2017-06-01 NOTE — MR AVS SNAPSHOT
Benita POSEY Geovani   6/1/2017   Anticoagulation Therapy Visit    Description:  36 year old female   Provider:  Christy Santana, RN   [facility]   [facility]           INR as of 6/1/2017     Today's INR 6.10!      Anticoagulation Summary as of 6/1/2017     INR goal 2.0-3.0   Today's INR 6.10!   Full instructions 6/1: Hold; 6/2: Hold; Otherwise 15 mg on Tue, Fri; 10 mg all other days   Next INR check 6/5/2017    Indications   Deep vein thrombosis (DVT) (H) [I82.409] [I82.409]  Long-term (current) use of anticoagulants [Z79.01] [Z79.01]  Homozygous Factor V Leiden mutation (H) (Resolved) [D68.52]         June 2017 Details    Sun Mon Tue Wed Thu Fri Sat         1      Hold   See details      2      Hold         3      10 mg           4      10 mg         5            6               7               8               9               10                 11               12               13               14               15               16               17                 18               19               20               21               22               23               24                 25               26               27               28               29               30                 Date Details   06/01 This INR check       Date of next INR:  6/5/2017         How to take your warfarin dose     To take:  10 mg Take 2 of the 5 mg tablets.    Hold Do not take your warfarin dose. See the Details table to the right for additional instructions.

## 2017-06-01 NOTE — TELEPHONE ENCOUNTER
The Center received a call regarding a high INR (6). I notified her anticoagulation monitoring clinic to address elevated INR. They will contact the patient.

## 2017-06-01 NOTE — PROGRESS NOTES
ANTICOAGULATION FOLLOW-UP CLINIC VISIT    Patient Name:  Benita Olivo  Date:  6/1/2017  Contact Type:  Telephone    SUBJECTIVE:     Patient Findings     Comments See previous encounter. Benita is on Flagyl and Doxycycline for fertility.  Benita is reminded to continue with bleeding precautions.           OBJECTIVE    INR   Date Value Ref Range Status   06/01/2017 6.10 (HH) 0.86 - 1.14 Final     Comment:     This test is intended for monitoring Coumadin therapy.  Results are not   accurate   in patients with prolonged INR due to factor deficiency.   Critical Value called to and read back by  PRICE TOMLINSON ON [id number] @ 1005          ASSESSMENT / PLAN  INR assessment SUPRA    Recheck INR In: 4 DAYS    INR Location Clinic      Anticoagulation Summary as of 6/1/2017     INR goal 2.0-3.0   Today's INR 6.10!   Maintenance plan 15 mg (5 mg x 3) on Tue, Fri; 10 mg (5 mg x 2) all other days   Full instructions 6/1: Hold; 6/2: Hold; Otherwise 15 mg on Tue, Fri; 10 mg all other days   Weekly total 80 mg   Plan last modified Thais Weiss RN (3/3/2017)   Next INR check 6/5/2017   Priority INR   Target end date     Indications   Deep vein thrombosis (DVT) (H) [I82.409] [I82.409]  Long-term (current) use of anticoagulants [Z79.01] [Z79.01]  Homozygous Factor V Leiden mutation (H) (Resolved) [D68.52]         Anticoagulation Episode Summary     INR check location     Preferred lab     Send INR reminders to [facility]    Comments patient contact number [phone]   name Rigo. Prefers Fridays for lab days and as infrequently as possible.      Anticoagulation Care Providers     Provider Role Specialty Phone number    Reed Garcia MD Responsible Hematology [phone]            See the Encounter Report to view Anticoagulation Flowsheet and Dosing Calendar (Go to Encounters tab in chart review, and find the Anticoagulation Therapy Visit)    Left message for patient with results and dosing  recommendations. Asked patient to call back to report any missed doses, falls, signs and symptoms of bleeding or clotting, any changes in health, medication, or diet. Asked patient to call back with any questions or concerns.     Christy Santana RN

## 2017-06-05 ENCOUNTER — ANTICOAGULATION THERAPY VISIT (OUTPATIENT)
Dept: ANTICOAGULATION | Facility: CLINIC | Age: 37
End: 2017-06-05

## 2017-06-05 DIAGNOSIS — I82.409 DEEP VEIN THROMBOSIS (DVT) (H): ICD-10-CM

## 2017-06-05 DIAGNOSIS — Z79.01 LONG-TERM (CURRENT) USE OF ANTICOAGULANTS: ICD-10-CM

## 2017-06-05 LAB — INR PPP: 2.1 (ref 0.86–1.14)

## 2017-06-05 PROCEDURE — 36416 COLLJ CAPILLARY BLOOD SPEC: CPT | Performed by: INTERNAL MEDICINE

## 2017-06-05 PROCEDURE — 85610 PROTHROMBIN TIME: CPT | Performed by: INTERNAL MEDICINE

## 2017-06-05 NOTE — PROGRESS NOTES
ANTICOAGULATION FOLLOW-UP CLINIC VISIT    Patient Name:  Benita Olivo  Date:  6/5/2017  Contact Type:  Telephone    SUBJECTIVE:     Patient Findings     Positives Antibiotic use or infection    Comments Benita is continuing on doxycycline and flagyl with an unknown end date.           OBJECTIVE    INR   Date Value Ref Range Status   06/05/2017 2.10 (H) 0.86 - 1.14 Corrected     Comment:     This test is intended for monitoring Coumadin therapy.  Results are not   accurate   in patients with prolonged INR due to factor deficiency.  CORRECTED ON 06/05 AT 1521: PREVIOUSLY REPORTED AS This test is intended for   monitoring Coumadin therapy.  Results are not accurate in patients with   prolonged INR due to factor deficiency.         ASSESSMENT / PLAN  INR assessment THER    Recheck INR In: 4 DAYS    INR Location Clinic      Anticoagulation Summary as of 6/5/2017     INR goal 2.0-3.0   Today's INR 2.10   Maintenance plan No maintenance plan   Full instructions 6/5: 10 mg; 6/6: 10 mg; 6/7: 10 mg; 6/8: 7.5 mg   Plan last modified Christy Santana RN (6/5/2017)   Next INR check 6/9/2017   Priority INR   Target end date     Indications   Deep vein thrombosis (DVT) (H) [I82.409] [I82.409]  Long-term (current) use of anticoagulants [Z79.01] [Z79.01]  Homozygous Factor V Leiden mutation (H) (Resolved) [D68.52]         Anticoagulation Episode Summary     INR check location     Preferred lab     Send [facility] reminders [facility]    Comments patient contact number [phone]   name Will Prefers Fridays for lab days and as infrequently as possible.      Anticoagulation Care Providers     Provider Role Specialty Phone number    Reed Garcia MD Responsible Hematology [phone]            See the Encounter Report to view Anticoagulation Flowsheet and Dosing Calendar (Go to Encounters tab in chart review, and find the Anticoagulation Therapy Visit)    Spoke with Benita.    Christy Santana RN      6/7/17: Benita called to say she finished Flagyl and doxycycline yesterday. Will keep current plan and INR Friday. -Alexus Weiss RN

## 2017-06-05 NOTE — MR AVS SNAPSHOT
Benita POSEY Geovani   6/5/2017   Anticoagulation Therapy Visit    Description:  36 year old female   Provider:  Christy Santana, RN   Department:  [facility]           INR as of 6/5/2017     Today's INR 2.10      Anticoagulation Summary as of 6/5/2017     INR goal 2.0-3.0   Today's INR 2.10   Full instructions 6/5: 10 mg; 6/6: 10 mg; 6/7: 10 mg; 6/8: 7.5 mg   Next INR check 6/9/2017    Indications   Deep vein thrombosis (DVT) (H) [I82.409] [I82.409]  Long-term (current) use of anticoagulants [Z79.01] [Z79.01]  Homozygous Factor V Leiden mutation (H) (Resolved) [D68.52]         June 2017 Details    Sun Mon Tue Wed Thu Fri Sat         1               2               3                 4               5      10 mg   See details      6      10 mg         7      10 mg         8      7.5 mg         9            10                 11               12               13               14               15               16               17                 18               19               20               21               22               23               24                 25               26               27               28               29               30                 Date Details   06/05 This INR check       Date of next INR:  6/9/2017         How to take your warfarin dose     To take:  7.5 mg Take 1.5 of the 5 mg tablets.    To take:  10 mg Take 2 of the 5 mg tablets.

## 2017-06-09 ENCOUNTER — ANTICOAGULATION THERAPY VISIT (OUTPATIENT)
Dept: ANTICOAGULATION | Facility: CLINIC | Age: 37
End: 2017-06-09

## 2017-06-09 DIAGNOSIS — Z79.01 LONG-TERM (CURRENT) USE OF ANTICOAGULANTS: ICD-10-CM

## 2017-06-09 DIAGNOSIS — I82.409 DEEP VEIN THROMBOSIS (DVT) (H): ICD-10-CM

## 2017-06-09 LAB — INR PPP: 2.7 (ref 0.86–1.14)

## 2017-06-09 PROCEDURE — 36416 COLLJ CAPILLARY BLOOD SPEC: CPT | Performed by: INTERNAL MEDICINE

## 2017-06-09 PROCEDURE — 85610 PROTHROMBIN TIME: CPT | Performed by: INTERNAL MEDICINE

## 2017-06-09 NOTE — MR AVS SNAPSHOT
Benita POSEY Geovani   6/9/2017   Anticoagulation Therapy Visit    Description:  36 year old female   Provider:  Olga Young, RN   Department:  [facility]           INR as of 6/9/2017     Today's INR 2.70      Anticoagulation Summary as of 6/9/2017     INR goal 2.0-3.0   Today's INR 2.70   Full instructions 6/9: 12.5 mg; 6/10: 10 mg; 6/11: 10 mg; 6/12: 10 mg; 6/13: 15 mg; 6/14: 10 mg; 6/15: 10 mg   Next INR check 6/16/2017    Indications   Deep vein thrombosis (DVT) (H) [I82.409] [I82.409]  Long-term (current) use of anticoagulants [Z79.01] [Z79.01]  Homozygous Factor V Leiden mutation (H) (Resolved) [D68.52]         June 2017 Details    Sun Mon Tue Wed Thu Fri Sat         1               2               3                 4               5               6               7               8               9      12.5 mg   See details      10      10 mg           11      10 mg         12      10 mg         13      15 mg         14      10 mg         15      10 mg         16            17                 18               19               20               21               22               23               24                 25               26               27               28               29               30                 Date Details   06/09 This INR check       Date of next INR:  6/16/2017         How to take your warfarin dose     To take:  10 mg Take 2 of the 5 mg tablets.    To take:  12.5 mg Take 2.5 of the 5 mg tablets.    To take:  15 mg Take 3 of the 5 mg tablets.

## 2017-06-16 ENCOUNTER — ANTICOAGULATION THERAPY VISIT (OUTPATIENT)
Dept: ANTICOAGULATION | Facility: CLINIC | Age: 37
End: 2017-06-16

## 2017-06-16 DIAGNOSIS — Z79.01 LONG-TERM (CURRENT) USE OF ANTICOAGULANTS: ICD-10-CM

## 2017-06-16 DIAGNOSIS — I82.409 DEEP VEIN THROMBOSIS (DVT) (H): ICD-10-CM

## 2017-06-16 LAB — INR PPP: 3.2 (ref 0.86–1.14)

## 2017-06-16 PROCEDURE — 36416 COLLJ CAPILLARY BLOOD SPEC: CPT | Performed by: INTERNAL MEDICINE

## 2017-06-16 PROCEDURE — 85610 PROTHROMBIN TIME: CPT | Performed by: INTERNAL MEDICINE

## 2017-06-16 NOTE — MR AVS SNAPSHOT
Benita POSEY Geovani   6/16/2017   Anticoagulation Therapy Visit    Description:  36 year old female   Provider:  Christy Santana, RN   Department:  [facility]           INR as of 6/16/2017     Today's INR 3.20!      Anticoagulation Summary as of 6/16/2017     INR goal 2.0-3.0   Today's INR 3.20!   Full instructions 6/16: 10 mg; 6/17: 10 mg; 6/18: 10 mg; 6/19: 10 mg; 6/20: 12.5 mg; 6/21: 10 mg; 6/22: 10 mg   Next INR check 6/23/2017    Indications   Deep vein thrombosis (DVT) (H) [I82.409] [I82.409]  Long-term (current) use of anticoagulants [Z79.01] [Z79.01]  Homozygous Factor V Leiden mutation (H) (Resolved) [D68.52]         June 2017 Details    [facility]         1               2               3                 4               5               6               7               8               9               10                 11               12               13               14               15               16      10 mg   See details      17      10 mg           18      10 mg         19      10 mg         20      12.5 mg         21      10 mg         22      10 mg         23            24                 25               26               27               28               29               30                 Date Details   06/16 This INR check       Date of next INR:  6/23/2017         How to take your warfarin dose     To take:  10 mg Take 2 of the 5 mg tablets.    To take:  12.5 mg Take 2.5 of the 5 mg tablets.

## 2017-06-16 NOTE — PROGRESS NOTES
ANTICOAGULATION FOLLOW-UP CLINIC VISIT    Patient Name:  Benita Olivo  Date:  6/16/2017  Contact Type:  Telephone    SUBJECTIVE:     Patient Findings     Comments Verified dosing on the calender is correct.           OBJECTIVE    INR   Date Value Ref Range Status   06/16/2017 3.20 (H) 0.86 - 1.14 Final     Comment:     This test is intended for monitoring Coumadin therapy.  Results are not   accurate   in patients with prolonged INR due to factor deficiency.         ASSESSMENT / PLAN  INR assessment SUPRA    Recheck INR In: 1 WEEK    INR Location Clinic      Anticoagulation Summary as of 6/16/2017     INR goal 2.0-3.0   Today's INR 3.20!   Maintenance plan No maintenance plan   Full instructions 6/16: 10 mg; 6/17: 10 mg; 6/18: 10 mg; 6/19: 10 mg; 6/20: 12.5 mg; 6/21: 10 mg; 6/22: 10 mg   Plan last modified Christy Santana RN (6/5/2017)   Next INR check 6/23/2017   Priority INR   Target end date     Indications   Deep vein thrombosis (DVT) (H) [I82.409] [I82.409]  Long-term (current) use of anticoagulants [Z79.01] [Z79.01]  Homozygous Factor V Leiden mutation (H) (Resolved) [D68.52]         Anticoagulation Episode Summary     INR check location     Preferred lab     Send INR reminders to [facility]    Comments patient contact number [phone]   name Will Prefers Fridays for lab days and as infrequently as possible.      Anticoagulation Care Providers     Provider Role Specialty Phone number    Reed Garcia MD Responsible Hematology [phone]            See the Encounter Report to view Anticoagulation Flowsheet and Dosing Calendar (Go to Encounters tab in chart review, and find the Anticoagulation Therapy Visit)    Spoke with Benita.    Christy Santana, RN

## 2017-06-23 ENCOUNTER — ANTICOAGULATION THERAPY VISIT (OUTPATIENT)
Dept: ANTICOAGULATION | Facility: CLINIC | Age: 37
End: 2017-06-23

## 2017-06-23 DIAGNOSIS — I82.409 DEEP VEIN THROMBOSIS (DVT) (H): ICD-10-CM

## 2017-06-23 DIAGNOSIS — Z79.01 LONG-TERM (CURRENT) USE OF ANTICOAGULANTS: ICD-10-CM

## 2017-06-23 LAB — INR PPP: 2.4 (ref 0.86–1.14)

## 2017-06-23 PROCEDURE — 85610 PROTHROMBIN TIME: CPT | Performed by: INTERNAL MEDICINE

## 2017-06-23 PROCEDURE — 36416 COLLJ CAPILLARY BLOOD SPEC: CPT | Performed by: INTERNAL MEDICINE

## 2017-06-23 NOTE — PROGRESS NOTES
ANTICOAGULATION FOLLOW-UP CLINIC VISIT    Patient Name:  Benita Olivo  Date:  6/23/2017  Contact Type:  Telephone    SUBJECTIVE:        OBJECTIVE    INR   Date Value Ref Range Status   06/23/2017 2.40 (H) 0.86 - 1.14 Final     Comment:     This test is intended for monitoring Coumadin therapy.  Results are not   accurate   in patients with prolonged INR due to factor deficiency.         ASSESSMENT / PLAN  INR assessment THER    Recheck INR In: 1 WEEK    INR Location Clinic      Anticoagulation Summary as of 6/23/2017     INR goal 2.0-3.0   Today's INR 2.40   Maintenance plan No maintenance plan   Full instructions 6/23: 10 mg; 6/24: 10 mg; 6/25: 10 mg; 6/26: 10 mg; 6/27: 12.5 mg; 6/28: 10 mg; 6/29: 10 mg   Plan last modified Christy Santana RN (6/5/2017)   Next INR check 6/30/2017   Priority INR   Target end date     Indications   Deep vein thrombosis (DVT) (H) [I82.409] [I82.409]  Long-term (current) use of anticoagulants [Z79.01] [Z79.01]  Homozygous Factor V Leiden mutation (H) (Resolved) [D68.52]         Anticoagulation Episode Summary     INR check location     Preferred lab     Send INR reminders to [facility]    Comments patient contact number [phone]   name Rigo. Prefers Fridays for lab days and as infrequently as possible.      Anticoagulation Care Providers     Provider Role Specialty Phone number    Reed Garcia MD Responsible Hematology [phone]            See the Encounter Report to view Anticoagulation Flowsheet and Dosing Calendar (Go to Encounters tab in chart review, and find the Anticoagulation Therapy Visit)    Left message for patient with results and dosing recommendations. Asked patient to call back to report any missed doses, falls, signs and symptoms of bleeding or clotting, any changes in health, medication, or diet. Asked patient to call back with any questions or concerns.     Thais Deng RN

## 2017-06-23 NOTE — MR AVS SNAPSHOT
Benita POSEY Olukenyatta   6/23/2017   Anticoagulation Therapy Visit    Description:  37 year old female   Provider:  Thais Deng, RN   Department:  [facility]           INR as of 6/23/2017     Today's INR 2.40      Anticoagulation Summary as of 6/23/2017     INR goal 2.0-3.0   Today's INR 2.40   Full instructions 6/23: 10 mg; 6/24: 10 mg; 6/25: 10 mg; 6/26: 10 mg; 6/27: 12.5 mg; 6/28: 10 mg; 6/29: 10 mg   Next INR check 6/30/2017    Indications   Deep vein thrombosis (DVT) (H) [I82.409] [I82.409]  Long-term (current) use of anticoagulants [Z79.01] [Z79.01]  Homozygous Factor V Leiden mutation (H) (Resolved) [D68.52]         June 2017 Details    Sun Mon Tue Wed Thu Fri Sat         1               2               3                 4               5               6               7               8               9               10                 11               12               13               14               15               16               17                 18               19               20               21               22               23      10 mg   See details      24      10 mg           25      10 mg         26      10 mg         27      12.5 mg         28      10 mg         29      10 mg         30              Date Details   06/23 This INR check       Date of next INR:  6/30/2017         How to take your warfarin dose     To take:  10 mg Take 2 of the 5 mg tablets.    To take:  12.5 mg Take 2.5 of the 5 mg tablets.

## 2017-06-30 ENCOUNTER — ANTICOAGULATION THERAPY VISIT (OUTPATIENT)
Dept: ANTICOAGULATION | Facility: CLINIC | Age: 37
End: 2017-06-30

## 2017-06-30 DIAGNOSIS — Z79.01 LONG-TERM (CURRENT) USE OF ANTICOAGULANTS: ICD-10-CM

## 2017-06-30 DIAGNOSIS — I82.409 DEEP VEIN THROMBOSIS (DVT) (H): ICD-10-CM

## 2017-06-30 LAB — INR PPP: 2.9 (ref 0.86–1.14)

## 2017-06-30 PROCEDURE — 36416 COLLJ CAPILLARY BLOOD SPEC: CPT | Performed by: INTERNAL MEDICINE

## 2017-06-30 PROCEDURE — 85610 PROTHROMBIN TIME: CPT | Performed by: INTERNAL MEDICINE

## 2017-06-30 NOTE — PROGRESS NOTES
"  ANTICOAGULATION FOLLOW-UP CLINIC VISIT    Patient Name:  Benita Olivo  Date:  6/30/2017  Contact Type:  Telephone    SUBJECTIVE:     Patient Findings     Positives Change in medications    Comments Started on provera - there is no known interaction with this & coumadin  Flagyl & doxycyline were discontinued \"awhile ago.\"           OBJECTIVE    INR   Date Value Ref Range Status   06/30/2017 2.90 (H) 0.86 - 1.14 Final     Comment:     This test is intended for monitoring Coumadin therapy.  Results are not   accurate   in patients with prolonged INR due to factor deficiency.         ASSESSMENT / PLAN  INR assessment THER    Recheck INR In: 9 DAYS    INR Location Clinic      Anticoagulation Summary as of 6/30/2017     INR goal 2.0-3.0   Today's INR 2.90   Maintenance plan 10 mg (5 mg x 2) every day   Full instructions 10 mg every day   Weekly total 70 mg   Plan last modified Leyla Navarro RN (6/30/2017)   Next INR check 7/12/2017   Priority INR   Target end date     Indications   Deep vein thrombosis (DVT) (H) [I82.409] [I82.409]  Long-term (current) use of anticoagulants [Z79.01] [Z79.01]  Homozygous Factor V Leiden mutation (H) (Resolved) [D68.51]         Anticoagulation Episode Summary     INR check location     Preferred lab     Send INR reminders to [facility]    Comments patient contact number [phone]   name Rigo. Prefers Fridays for lab days and as infrequently as possible.      Anticoagulation Care Providers     Provider Role Specialty Phone number    Reed Garcia MD Responsible Hematology [phone]            See the Encounter Report to view Anticoagulation Flowsheet and Dosing Calendar (Go to Encounters tab in chart review, and find the Anticoagulation Therapy Visit)    INR has been climbing with a 12.5 mg dose/wk., so new maintenance dose is initiated today with 10 mg q day    Leyla Navarro RN               "

## 2017-06-30 NOTE — MR AVS SNAPSHOT
Benitastaci Olivo   6/30/2017   Anticoagulation Therapy Visit    Description:  37 year old female   Provider:  Leyla Navarro, RN   Department:  [facility]           INR as of 6/30/2017     Today's INR 2.90      Anticoagulation Summary as of 6/30/2017     INR goal 2.0-3.0   Today's INR 2.90   Full instructions 10 mg every day   Next INR check 7/12/2017    Indications   Deep vein thrombosis (DVT) (H) [I82.409] [I82.409]  Long-term (current) use of anticoagulants [Z79.01] [Z79.01]  Homozygous Factor V Leiden mutation (H) (Resolved) [D68.51]         June 2017 Details    Sun Mon Tue Wed Thu Fri Sat         1               2               3                 4               5               6               7               8               9               10                 11               12               13               14               15               16               17                 18               19               20               21               22               23               24                 25               26               27               28               29               30      10 mg   See details        Date Details   06/30 This INR check               How to take your warfarin dose     To take:  10 mg Take 2 of the 5 mg tablets.           July 2017 Details    Sun Mon Tue Wed Thu Fri Sat           1      10 mg           2      10 mg         3      10 mg         4      10 mg         5      10 mg         6      10 mg         7      10 mg         8      10 mg           9      10 mg         10      10 mg         11      10 mg         12            13               14               15                 16               17               18               19               20               21               22                 23               24               25               26               27               28               29                 30               31                     Date Details   No  additional details    Date of next INR:  7/12/2017         How to take your warfarin dose     To take:  10 mg Take 2 of the 5 mg tablets.

## 2017-07-11 ENCOUNTER — TRANSFERRED RECORDS (OUTPATIENT)
Dept: HEALTH INFORMATION MANAGEMENT | Facility: CLINIC | Age: 37
End: 2017-07-11

## 2017-07-12 ENCOUNTER — ANTICOAGULATION THERAPY VISIT (OUTPATIENT)
Dept: ANTICOAGULATION | Facility: CLINIC | Age: 37
End: 2017-07-12

## 2017-07-12 DIAGNOSIS — I82.409 DEEP VEIN THROMBOSIS (DVT) (H): ICD-10-CM

## 2017-07-12 DIAGNOSIS — Z79.01 LONG-TERM (CURRENT) USE OF ANTICOAGULANTS: ICD-10-CM

## 2017-07-12 LAB — INR PPP: 2.4 (ref 0.86–1.14)

## 2017-07-12 PROCEDURE — 36416 COLLJ CAPILLARY BLOOD SPEC: CPT | Performed by: INTERNAL MEDICINE

## 2017-07-12 PROCEDURE — 85610 PROTHROMBIN TIME: CPT | Performed by: INTERNAL MEDICINE

## 2017-07-12 NOTE — MR AVS SNAPSHOT
Benita Olivo   7/12/2017   Anticoagulation Therapy Visit    Description:  37 year old female   Provider:  Christy Santana, RN   Department:  [facility]           INR as of 7/12/2017     Today's INR 2.40      Anticoagulation Summary as of 7/12/2017     INR goal 2.0-3.0   Today's INR 2.40   Full instructions 10 mg every day   Next INR check 7/28/2017    Indications   Deep vein thrombosis (DVT) (H) [I82.409] [I82.409]  Long-term (current) use of anticoagulants [Z79.01] [Z79.01]  Homozygous Factor V Leiden mutation (H) (Resolved) [D68.51]         July 2017 Details    Sun Mon Tue Wed Thu Fri Sat           1                 2               3               4               5               6               7               8                 9               10               11               12      10 mg   See details      13      10 mg         14      10 mg         15      10 mg           16      10 mg         17      10 mg         18      10 mg         19      10 mg         20      10 mg         21      10 mg         22      10 mg           23      10 mg         24      10 mg         25      10 mg         26      10 mg         27      10 mg         28            29                 30               31                     Date Details   07/12 This INR check       Date of next INR:  7/28/2017         How to take your warfarin dose     To take:  10 mg Take 2 of the 5 mg tablets.

## 2017-07-12 NOTE — PROGRESS NOTES
ANTICOAGULATION FOLLOW-UP CLINIC VISIT    Patient Name:  Benita Olivo  Date:  7/12/2017  Contact Type:  Telephone    SUBJECTIVE:     Patient Findings     Positives No Problem Findings           OBJECTIVE    INR   Date Value Ref Range Status   07/12/2017 2.40 (H) 0.86 - 1.14 Final     Comment:     This test is intended for monitoring Coumadin therapy.  Results are not   accurate   in patients with prolonged INR due to factor deficiency.         ASSESSMENT / PLAN  INR assessment THER    Recheck INR In: 2 WEEKS    INR Location Clinic      Anticoagulation Summary as of 7/12/2017     INR goal 2.0-3.0   Today's INR 2.40   Maintenance plan 10 mg (5 mg x 2) every day   Full instructions 10 mg every day   Weekly total 70 mg   Plan last modified Leyla Navarro RN (6/30/2017)   Next INR check 7/28/2017   Priority INR   Target end date     Indications   Deep vein thrombosis (DVT) (H) [I82.409] [I82.409]  Long-term (current) use of anticoagulants [Z79.01] [Z79.01]  Homozygous Factor V Leiden mutation (H) (Resolved) [D68.51]         Anticoagulation Episode Summary     INR check location     Preferred lab     Send INR reminders to [facility]    Comments patient contact number [phone]   name Rigo. Prefers Fridays for lab days and as infrequently as possible.      Anticoagulation Care Providers     Provider Role Specialty Phone number    Reed Garcia MD Responsible Hematology [phone]            See the Encounter Report to view Anticoagulation Flowsheet and Dosing Calendar (Go to Encounters tab in chart review, and find the Anticoagulation Therapy Visit)    Spoke with Benita.    Christy Santana RN

## 2017-07-19 ENCOUNTER — ANTICOAGULATION THERAPY VISIT (OUTPATIENT)
Dept: ANTICOAGULATION | Facility: CLINIC | Age: 37
End: 2017-07-19

## 2017-07-19 DIAGNOSIS — I82.409 DEEP VEIN THROMBOSIS (DVT) (H): ICD-10-CM

## 2017-07-19 DIAGNOSIS — Z79.01 LONG-TERM (CURRENT) USE OF ANTICOAGULANTS: ICD-10-CM

## 2017-07-19 NOTE — MR AVS SNAPSHOT
Benita Olivo   7/19/2017   Anticoagulation Therapy Visit    Description:  37 year old female   Provider:  Noemi Morgan, RN   Department:  [facility]           INR as of 7/19/2017     Today's INR No new INR was available at the time of this encounter.      Anticoagulation Summary as of 7/19/2017     INR goal 2.0-3.0   Today's INR No new INR was available at the time of this encounter.   Full instructions 7/20: 7.5 mg; 7/21: 7.5 mg; 7/22: 7.5 mg; 7/23: 7.5 mg; Otherwise 10 mg every day   Next INR check 7/24/2017    Indications   Deep vein thrombosis (DVT) (H) [I82.409] [I82.409]  Long-term (current) use of anticoagulants [Z79.01] [Z79.01]  Homozygous Factor V Leiden mutation (H) (Resolved) [D68.51]         July 2017 Details    Sun Mon Tue Wed Thu Fri Sat           1                 2               3               4               5               6               7               8                 9               10               11               12               13               14               15                 16               17               18               19      10 mg   See details      20      7.5 mg         21      7.5 mg         22      7.5 mg           23      7.5 mg         24            25               26               27               28               29                 30               31                     Date Details   07/19 This INR check       Date of next INR:  7/24/2017         How to take your warfarin dose     To take:  7.5 mg Take 1.5 of the 5 mg tablets.    To take:  10 mg Take 2 of the 5 mg tablets.

## 2017-07-19 NOTE — PROGRESS NOTES
ANTICOAGULATION FOLLOW-UP CLINIC VISIT    Patient Name:  Benita Olivo  Date:  7/19/2017  Contact Type:  Telephone    SUBJECTIVE:     Patient Findings     Positives Change in medications, Antibiotic use or infection    Comments Pt is starting Doxycycline and Flagyl 7/20 for infertility. In the past this regimen has made the INR increase to supratherapeutic. Also pt reports that she is having a IVF on 7/31 and the MD would like her to be off her Warfarin and on Lovenox. Pt left a message with Dr. Garcia's staff. Writer also sent an in-basket message to Dr. Garcia. Calculated Lovenox 100mg Q 12 hours and asked Dr. Garcia if we should hold Warfarin for 5 days (starting 7/26).             OBJECTIVE    INR   Date Value Ref Range Status   07/12/2017 2.40 (H) 0.86 - 1.14 Final     Comment:     This test is intended for monitoring Coumadin therapy.  Results are not   accurate   in patients with prolonged INR due to factor deficiency.         ASSESSMENT / PLAN  No question data found.  Anticoagulation Summary as of 7/19/2017     INR goal 2.0-3.0   Today's INR No new INR was available at the time of this encounter.   Maintenance plan 10 mg (5 mg x 2) every day   Full instructions 7/20: 7.5 mg; 7/21: 7.5 mg; 7/22: 7.5 mg; 7/23: 7.5 mg; Otherwise 10 mg every day   Weekly total 70 mg   Plan last modified Leyla Navarro RN (6/30/2017)   Next INR check 7/24/2017   Priority INR   Target end date     Indications   Deep vein thrombosis (DVT) (H) [I82.409] [I82.409]  Long-term (current) use of anticoagulants [Z79.01] [Z79.01]  Homozygous Factor V Leiden mutation (H) (Resolved) [D68.51]         Anticoagulation Episode Summary     INR check location     Preferred lab     Send INR reminders to [facility]    Comments patient contact number [phone]   name Rigo. Prefers Fridays for lab days and as infrequently as possible.      Anticoagulation Care Providers     Provider Role Specialty Phone number    Reed Garcia  MD [facility] Responsible Hematology [phone]            See the Encounter Report to view Anticoagulation Flowsheet and Dosing Calendar (Go to Encounters tab in chart review, and find the Anticoagulation Therapy Visit)    Spoke with patient. Gave them their lab results and new warfarin recommendation.  No changes in health, medication, or diet. No missed doses, no falls. No signs or symptoms of bleed or clotting.     Pt will call us if she hears back sooner to stop her Warfarin and to bridge with Lovenox     Noemi Morgan RN

## 2017-07-24 ENCOUNTER — ANTICOAGULATION THERAPY VISIT (OUTPATIENT)
Dept: ANTICOAGULATION | Facility: CLINIC | Age: 37
End: 2017-07-24

## 2017-07-24 ENCOUNTER — TELEPHONE (OUTPATIENT)
Dept: HEMATOLOGY | Facility: CLINIC | Age: 37
End: 2017-07-24

## 2017-07-24 DIAGNOSIS — Z79.01 LONG-TERM (CURRENT) USE OF ANTICOAGULANTS: ICD-10-CM

## 2017-07-24 DIAGNOSIS — I82.409 DEEP VEIN THROMBOSIS (DVT) (H): ICD-10-CM

## 2017-07-24 DIAGNOSIS — Z79.01 CURRENT USE OF LONG TERM ANTICOAGULATION: Primary | ICD-10-CM

## 2017-07-24 LAB — INR PPP: 2.6 (ref 0.86–1.14)

## 2017-07-24 PROCEDURE — 85610 PROTHROMBIN TIME: CPT | Performed by: INTERNAL MEDICINE

## 2017-07-24 PROCEDURE — 36416 COLLJ CAPILLARY BLOOD SPEC: CPT | Performed by: INTERNAL MEDICINE

## 2017-07-24 NOTE — PROGRESS NOTES
ANTICOAGULATION FOLLOW-UP CLINIC VISIT    Patient Name:  Benita Olivo  Date:  7/24/2017  Contact Type:  Telephone    SUBJECTIVE:        OBJECTIVE    INR   Date Value Ref Range Status   07/24/2017 2.60 (H) 0.86 - 1.14 Final     Comment:     This test is intended for monitoring Coumadin therapy.  Results are not   accurate   in patients with prolonged INR due to factor deficiency.         ASSESSMENT / PLAN  INR assessment THER    Recheck INR In: 10 DAYS    INR Location Clinic      Anticoagulation Summary as of 7/24/2017     INR goal 2.0-3.0   Today's INR 2.60   Maintenance plan 10 mg (5 mg x 2) every day   Full instructions 7/24: 7.5 mg; 7/25: 7.5 mg; 7/26: Hold; 7/27: Hold; 7/28: Hold; 7/29: Hold; 7/30: Hold; Otherwise 10 mg every day   Weekly total 70 mg   Plan last modified Leyla Navarro RN (6/30/2017)   Next INR check 8/4/2017   Priority INR   Target end date     Indications   Deep vein thrombosis (DVT) (H) [I82.409] [I82.409]  Long-term (current) use of anticoagulants [Z79.01] [Z79.01]  Homozygous Factor V Leiden mutation (H) (Resolved) [D68.51]         Anticoagulation Episode Summary     INR check location     Preferred lab     Send INR reminders to [facility]    Comments patient contact number [phone]   name Rigo. Prefers Fridays for lab days and as infrequently as possible.      Anticoagulation Care Providers     Provider Role Specialty Phone number    Reed Garcia MD Responsible Hematology [phone]            See the Encounter Report to view Anticoagulation Flowsheet and Dosing Calendar (Go to Encounters tab in chart review, and find the Anticoagulation Therapy Visit)  Left message for patient with results and dosing recommendations. Asked patient to call back to report any missed doses, falls, signs and symptoms of bleeding or clotting, any changes in health, medication, or diet. Asked patient to call back with any questions or concerns.      Olga Young,  RN

## 2017-07-24 NOTE — MR AVS SNAPSHOT
Benita POSEY Geovani   7/24/2017   Anticoagulation Therapy Visit    Description:  37 year old female   Provider:  Olga Young, RN   Department:  [facility]           INR as of 7/24/2017     Today's INR 2.60      Anticoagulation Summary as of 7/24/2017     INR goal 2.0-3.0   Today's INR 2.60   Full instructions 7/24: 7.5 mg; 7/25: 7.5 mg; 7/26: Hold; 7/27: Hold; 7/28: Hold; 7/29: Hold; 7/30: Hold; Otherwise 10 mg every day   Next INR check 8/4/2017    Indications   Deep vein thrombosis (DVT) (H) [I82.409] [I82.409]  Long-term (current) use of anticoagulants [Z79.01] [Z79.01]  Homozygous Factor V Leiden mutation (H) (Resolved) [D68.51]         July 2017 Details    Sun Mon Tue Wed Thu Fri Sat           1                 2               3               4               5               6               7               8                 9               10               11               12               13               14               15                 16               17               18               19               20               21               22                 23               24      7.5 mg   See details      25      7.5 mg         26      Hold         27      Hold         28      Hold         29      Hold           30      Hold         31      10 mg               Date Details   07/24 This INR check               How to take your warfarin dose     To take:  7.5 mg Take 1.5 of the 5 mg tablets.    To take:  10 mg Take 2 of the 5 mg tablets.    Hold Do not take your warfarin dose. See the Details table to the right for additional instructions.                August 2017 Details    Sun Mon Tue Wed Thu Fri Sat       1      10 mg         2      10 mg         3      10 mg         4            5                 6               7               8               9               10               11               12                 13               14               15               16               17                18               19                 20               21               22               23               24               25               26                 27               28               29               30               31                  Date Details   No additional details    Date of next INR:  8/4/2017         How to take your warfarin dose     To take:  10 mg Take 2 of the 5 mg tablets.

## 2017-07-24 NOTE — TELEPHONE ENCOUNTER
Mrs. Olivo had called the Center regarding an anticoagulation plan for her upcoming fertility clinic procedure (egg harvest and subsequent implantation). After discussion with Dr. Garcia I spoke to Mrs. Olivo with the following plan:  1. She will stop her Coumadin 5 days prior to the harvest.  2. The morning after her procedure she will begin enoxaparin 1 mg/kg subcutaneously every 12 hours. A heparin Xa level will be checked 8/4/2017.

## 2017-07-24 NOTE — PROGRESS NOTES
Holding warfarin x 5 days is ok.   Bridge with enoxaparin as we have done before.     Thank you.     Reed.     7/26 Hold Warfarin  7/27 Hold Warfarin and Lovenox 100mg PM  7/28 Hold Warfarin and Lovenox 100mg AM and PM  7/29 Hold Warfarin and Lovenox 100mg AM and PM  7/30 Hold Warfarin and Hold Lovenox  7/31 Procedure. Will f/u with pt. Pt is to remain on Lovenox until she takes a pregnancy test and if negative will restart Warfarin. (Did send another in-basket message to make sure this is ok).   This plan was communicated to pt and she reports she has enough Lovenox and will get an INR today just to make sure INR hasn't changed with being on Doxycylcine and Flagyl. Noemi Morgan RN

## 2017-08-11 DIAGNOSIS — I82.409 DEEP VEIN THROMBOSIS (DVT) (H): ICD-10-CM

## 2017-08-11 DIAGNOSIS — Z79.01 CURRENT USE OF LONG TERM ANTICOAGULATION: ICD-10-CM

## 2017-08-11 DIAGNOSIS — Z79.01 LONG-TERM (CURRENT) USE OF ANTICOAGULANTS: ICD-10-CM

## 2017-08-11 LAB — LMWH PPP CHRO-ACNC: 1.17 IU/ML

## 2017-08-11 PROCEDURE — 36415 COLL VENOUS BLD VENIPUNCTURE: CPT | Performed by: INTERNAL MEDICINE

## 2017-08-11 PROCEDURE — 85520 HEPARIN ASSAY: CPT | Performed by: INTERNAL MEDICINE

## 2017-08-21 ENCOUNTER — ANTICOAGULATION THERAPY VISIT (OUTPATIENT)
Dept: ANTICOAGULATION | Facility: CLINIC | Age: 37
End: 2017-08-21

## 2017-08-21 DIAGNOSIS — I82.409 DEEP VEIN THROMBOSIS (DVT) (H): ICD-10-CM

## 2017-08-21 DIAGNOSIS — Z79.01 LONG-TERM (CURRENT) USE OF ANTICOAGULANTS: ICD-10-CM

## 2017-08-21 NOTE — PROGRESS NOTES
ANTICOAGULATION FOLLOW-UP CLINIC VISIT    Patient Name:  Benita Olivo  Date:  8/21/2017  Contact Type:  Telephone    SUBJECTIVE:     Patient Findings     Positives Initiation of therapy    Comments Coumadin is resumed today after pt has been on lovenox for ~ 1 month.  She reports a recent pregnancy test was negative, and the coumadin can be resumed.  She recently had an egg harvest for infertility problems.  She plans to attempt pregnancy again later this fall.  Writer speaks with FAINA Torres RN from Bleeding[facility]otting to review this plan & inform him of this information.  Lovenox continues q 12 hr until INR therapeutic.  Pt is reminded when pregnancy is attempted again this bridging plan with lovenox will need to be reinstituted.            OBJECTIVE    INR   Date Value Ref Range Status   07/24/2017 2.60 (H) 0.86 - 1.14 Final     Comment:     This test is intended for monitoring Coumadin therapy.  Results are not   accurate   in patients with prolonged INR due to factor deficiency.         ASSESSMENT / PLAN  Recheck INR In: 3 DAYS    INR Location Clinic      Anticoagulation Summary as of 8/21/2017     INR goal 2.0-3.0   Today's INR No new INR was available at the time of this encounter.   Maintenance plan 10 mg (5 mg x 2) every day   Full instructions 8/21: 15 mg; 8/22: 12.5 mg; Otherwise 10 mg every day   Weekly total 70 mg   Plan last modified Leyla Navarro RN (6/30/2017)   Next INR check 8/24/2017   Priority INR   Target end date     Indications   Deep vein thrombosis (DVT) (H) [I82.409] [I82.409]  Long-term (current) use of anticoagulants [Z79.01] [Z79.01]  Homozygous Factor V Leiden mutation (H) (Resolved) [D68.51]         Anticoagulation Episode Summary     INR check location     Preferred lab     Send INR reminders to [facility]    Comments patient contact number [phone]   name Rigo. Prefers Fridays for lab days and as infrequently as possible.      Anticoagulation Care Providers      Provider Role Specialty Phone number    Reed Garcia MD Responsible Hematology [phone]            See the Encounter Report to view Anticoagulation Flowsheet and Dosing Calendar (Go to Encounters tab in chart review, and find the Anticoagulation Therapy Visit)    Spoke with patient.  See progress note from earlier today & above notation.        Leyla Navarro RN

## 2017-08-21 NOTE — PROGRESS NOTES
Pt leaves a message on voicemail today stating she wonders when she should re-start her coumadin.  Writer phones back, voicemail is rec'd and message left indicating we were unaware she was not taking coumadin.  A heparin Xa was drawn earlier this month, otherwise we have been following INRs.  Request left to call us back & fill us in on what has transpired in the last month.  No dosing recommendations are given until more information is obtained.   Cricket HONEYCUTT

## 2017-08-21 NOTE — MR AVS SNAPSHOT
Benita POSEY Geovani   8/21/2017   Anticoagulation Therapy Visit    Description:  37 year old female   Provider:  Leyla Navarro, RN   Department:  [facility]           INR as of 8/21/2017     Today's INR       Anticoagulation Summary as of 8/21/2017     INR goal 2.0-3.0   Today's INR    Next INR check     Indications   Deep vein thrombosis (DVT) (H) [I82.409] [I82.409]  Long-term (current) use of anticoagulants [Z79.01] [Z79.01]  Homozygous Factor V Leiden mutation (H) (Resolved) [D68.51]         July 2017 Details    Sun Mon Tue Wed Thu Fri Sat           1                 2               3               4               5               6               7               8                 9               10               11               12               13               14               15                 16               17               18               19               20               21               22                 23               24      7.5 mg   See details      25      7.5 mg         26      Hold         27      Hold         28      Hold         29      Hold           30      Hold         31      10 mg               Date Details   07/24 This INR check               How to take your warfarin dose     To take:  7.5 mg Take 1.5 of the 5 mg tablets.    To take:  10 mg Take 2 of the 5 mg tablets.    Hold Do not take your warfarin dose. See the Details table to the right for additional instructions.                August 2017 Details    Sun Mon Tue Wed Thu Fri Sat       1      10 mg         2      10 mg         3      10 mg         4            5                 6               7               8               9               10               11               12                 13               14               15               16               17               18               19                 20               21               22               23               24               25               26                  27               28               29               30               31                  Date Details   No additional details    Date of next INR:  8/4/2017         How to take your warfarin dose     To take:  10 mg Take 2 of the 5 mg tablets.

## 2017-08-24 ENCOUNTER — ANTICOAGULATION THERAPY VISIT (OUTPATIENT)
Dept: ANTICOAGULATION | Facility: CLINIC | Age: 37
End: 2017-08-24

## 2017-08-24 DIAGNOSIS — Z79.01 LONG-TERM (CURRENT) USE OF ANTICOAGULANTS: ICD-10-CM

## 2017-08-24 DIAGNOSIS — I82.409 DEEP VEIN THROMBOSIS (DVT) (H): ICD-10-CM

## 2017-08-24 LAB — INR PPP: 1.1 (ref 0.86–1.14)

## 2017-08-24 PROCEDURE — 36416 COLLJ CAPILLARY BLOOD SPEC: CPT | Performed by: INTERNAL MEDICINE

## 2017-08-24 PROCEDURE — 85610 PROTHROMBIN TIME: CPT | Performed by: INTERNAL MEDICINE

## 2017-08-24 NOTE — PROGRESS NOTES
Patient on Lovenox 100mg every 12 hours.  ANTICOAGULATION FOLLOW-UP CLINIC VISIT    Patient Name:  Benita Olivo  Date:  8/24/2017  Contact Type:  Telephone    SUBJECTIVE:     Patient Findings     Positives No Problem Findings    Comments Patient continues on 100mg of Lovenox every 12 hours.  Restarted Coumadin on 8/21/17.           OBJECTIVE    INR   Date Value Ref Range Status   08/24/2017 1.10 0.86 - 1.14 Final     Comment:     This test is intended for monitoring Coumadin therapy.  Results are not   accurate in patients with prolonged INR due to factor deficiency.         ASSESSMENT / PLAN  INR assessment SUB    Recheck INR In: 4 DAYS    INR Location Clinic      Anticoagulation Summary as of 8/24/2017     INR goal 2.0-3.0   Today's INR 1.10!   Maintenance plan No maintenance plan   Full instructions 8/24: 12.5 mg; 8/25: 12.5 mg; 8/26: 12.5 mg; 8/27: 10 mg   Plan last modified Werner Franks RN (8/24/2017)   Next INR check 8/28/2017   Priority INR   Target end date     Indications   Deep vein thrombosis (DVT) (H) [I82.409] [I82.409]  Long-term (current) use of anticoagulants [Z79.01] [Z79.01]  Homozygous Factor V Leiden mutation (H) (Resolved) [D68.51]         Anticoagulation Episode Summary     INR check location     Preferred lab     Send INR reminders to [facility]    Comments patient contact number [phone]   name Rigo. Prefers Fridays for lab days and as infrequently as possible.      Anticoagulation Care Providers     Provider Role Specialty Phone number    Reed Garcia MD [facility] Hematology [phone]            See the Encounter Report to view Anticoagulation Flowsheet and Dosing Calendar (Go to Encounters tab in chart review, and find the Anticoagulation Therapy Visit)    Spoke to Benita.  She restarted Coumadin following our recommendations on 8/21.  She is still on Lovenox twice a day.  She will recheck INR on Monday.    Werner Franks RN

## 2017-08-24 NOTE — MR AVS SNAPSHOT
Benita POSEY Geovani   8/24/2017   Anticoagulation Therapy Visit    Description:  37 year old female   Provider:  Werner Franks RN   Department:  [facility]           INR as of 8/24/2017     Today's INR 1.10!      Anticoagulation Summary as of 8/24/2017     INR goal 2.0-3.0   Today's INR 1.10!   Full instructions 8/24: 12.5 mg; 8/25: 12.5 mg; 8/26: 12.5 mg; 8/27: 10 mg   Next INR check 8/28/2017    Indications   Deep vein thrombosis (DVT) (H) [I82.409] [I82.409]  Long-term (current) use of anticoagulants [Z79.01] [Z79.01]  Homozygous Factor V Leiden mutation (H) (Resolved) [D68.51]         August 2017 Details    Sun Mon Tue Wed Thu Fri Sat       1               2               3               4               5                 6               7               8               9               10               11               12                 13               14               15               16               17               18               19                 20               21               22               23               24      12.5 mg   See details      25      12.5 mg         26      12.5 mg           27      10 mg         28            29               30               31                  Date Details   08/24 This INR check       Date of next INR:  8/28/2017         How to take your warfarin dose     To take:  10 mg Take 2 of the 5 mg tablets.    To take:  12.5 mg Take 2.5 of the 5 mg tablets.

## 2017-08-27 ENCOUNTER — HEALTH MAINTENANCE LETTER (OUTPATIENT)
Age: 37
End: 2017-08-27

## 2017-08-28 ENCOUNTER — ANTICOAGULATION THERAPY VISIT (OUTPATIENT)
Dept: ANTICOAGULATION | Facility: CLINIC | Age: 37
End: 2017-08-28

## 2017-08-28 DIAGNOSIS — I82.409 DEEP VEIN THROMBOSIS (DVT) (H): ICD-10-CM

## 2017-08-28 DIAGNOSIS — Z79.01 LONG-TERM (CURRENT) USE OF ANTICOAGULANTS: ICD-10-CM

## 2017-08-28 LAB — INR PPP: 1.8 (ref 0.86–1.14)

## 2017-08-28 PROCEDURE — 85610 PROTHROMBIN TIME: CPT | Performed by: FAMILY MEDICINE

## 2017-08-28 PROCEDURE — 36416 COLLJ CAPILLARY BLOOD SPEC: CPT | Performed by: FAMILY MEDICINE

## 2017-08-28 NOTE — PROGRESS NOTES
ANTICOAGULATION FOLLOW-UP CLINIC VISIT    Patient Name:  Benita Olivo  Date:  8/28/2017  Contact Type:  Telephone    SUBJECTIVE:     Patient Findings     Positives Change in medications (she will continue on lovenox 100 mg BID)           OBJECTIVE    INR   Date Value Ref Range Status   08/28/2017 1.80 (H) 0.86 - 1.14 Final     Comment:     This test is intended for monitoring Coumadin therapy.  Results are not   accurate in patients with prolonged INR due to factor deficiency.         ASSESSMENT / PLAN  INR assessment SUB    Recheck INR In: 2 DAYS    INR Location Clinic      Anticoagulation Summary as of 8/28/2017     INR goal 2.0-3.0   Today's INR 1.80!   Maintenance plan No maintenance plan   Full instructions 8/28: 12.5 mg; 8/29: 12.5 mg   Plan last modified Werner Franks RN (8/24/2017)   Next INR check 8/30/2017   Priority INR   Target end date     Indications   Deep vein thrombosis (DVT) (H) [I82.409] [I82.409]  Long-term (current) use of anticoagulants [Z79.01] [Z79.01]  Homozygous Factor V Leiden mutation (H) (Resolved) [D68.51]         Anticoagulation Episode Summary     INR check location     Preferred lab     Send INR reminders to [facility]    Comments patient contact number [phone]   name Rigo. Prefers Fridays for lab days and as infrequently as possible.      Anticoagulation Care Providers     Provider Role Specialty Phone number    Reed Garcia MD Responsible Hematology [phone]            See the Encounter Report to view Anticoagulation Flowsheet and Dosing Calendar (Go to Encounters tab in chart review, and find the Anticoagulation Therapy Visit)    Spoke with Benita.  She reports no changes in health, diet, medications.  She will continue lovenox 100 mg BID until INR is therapeutic x 2.      Thais Deng RN

## 2017-08-28 NOTE — MR AVS SNAPSHOT
Benita Olivo   8/28/2017   Anticoagulation Therapy Visit    Description:  37 year old female   Provider:  Thais Deng RN   Department:   [facility]           INR as of 8/28/2017     Today's INR 1.80!      Anticoagulation Summary as of 8/28/2017     INR goal 2.0-3.0   Today's INR 1.80!   Full instructions 8/28: 12.5 mg; 8/29: 12.5 mg   Next INR check 8/30/2017    Indications   Deep vein thrombosis (DVT) (H) [I82.409] [I82.409]  Long-term (current) use of anticoagulants [Z79.01] [Z79.01]  Homozygous Factor V Leiden mutation (H) (Resolved) [D68.51]         August 2017 Details    Sun Mon Tue Wed Thu Fri Sat       1               2               3               4               5                 6               7               8               9               10               11               12                 13               14               15               16               17               18               19                 20               21               22               23               24               25               26                 27               28      12.5 mg   See details      29      12.5 mg         30            31                  Date Details   08/28 This INR check       Date of next INR:  8/30/2017         How to take your warfarin dose     To take:  12.5 mg Take 2.5 of the 5 mg tablets.

## 2017-08-30 ENCOUNTER — ANTICOAGULATION THERAPY VISIT (OUTPATIENT)
Dept: ANTICOAGULATION | Facility: CLINIC | Age: 37
End: 2017-08-30

## 2017-08-30 DIAGNOSIS — Z79.01 LONG-TERM (CURRENT) USE OF ANTICOAGULANTS: ICD-10-CM

## 2017-08-30 DIAGNOSIS — I82.409 DEEP VEIN THROMBOSIS (DVT) (H): ICD-10-CM

## 2017-08-30 LAB — INR PPP: 2.1 (ref 0.86–1.14)

## 2017-08-30 PROCEDURE — 36416 COLLJ CAPILLARY BLOOD SPEC: CPT | Performed by: INTERNAL MEDICINE

## 2017-08-30 PROCEDURE — 85610 PROTHROMBIN TIME: CPT | Performed by: INTERNAL MEDICINE

## 2017-08-30 NOTE — PROGRESS NOTES
ANTICOAGULATION FOLLOW-UP CLINIC VISIT    Patient Name:  Benita Olivo  Date:  8/30/2017  Contact Type:  Telephone    SUBJECTIVE:     Patient Findings     Comments Benita is able to stop Lovenox today per 8/21 note.  Benita also reports that she is eating increased greens.  Writer will increase current maintenance plan and recheck INR again in 1 week.             OBJECTIVE    INR   Date Value Ref Range Status   08/30/2017 2.10 (H) 0.86 - 1.14 Final     Comment:     This test is intended for monitoring Coumadin therapy.  Results are not   accurate in patients with prolonged INR due to factor deficiency.         ASSESSMENT / PLAN  INR assessment THER    Recheck INR In: 1 WEEK    INR Location Clinic      Anticoagulation Summary as of 8/30/2017     INR goal 2.0-3.0   Today's INR 2.10   Maintenance plan 12.5 mg (5 mg x 2.5) on Mon, Wed, Fri; 10 mg (5 mg x 2) all other days   Full instructions 12.5 mg on Mon, Wed, Fri; 10 mg all other days   Weekly total 77.5 mg   Plan last modified Christy Santana RN (8/30/2017)   Next INR check 9/6/2017   Priority INR   Target end date     Indications   Deep vein thrombosis (DVT) (H) [I82.409] [I82.409]  Long-term (current) use of anticoagulants [Z79.01] [Z79.01]  Homozygous Factor V Leiden mutation (H) (Resolved) [D68.51]         Anticoagulation Episode Summary     INR check location     Preferred lab     Send INR reminders to [facility]    Comments patient contact number [phone]   name Rigo. Prefers Fridays for lab days and as infrequently as possible.      Anticoagulation Care Providers     Provider Role Specialty Phone number    Reed Garcia MD Responsible Hematology [phone]            See the Encounter Report to view Anticoagulation Flowsheet and Dosing Calendar (Go to Encounters tab in chart review, and find the Anticoagulation Therapy Visit)    Spoke with Benita.    Christy Santana RN

## 2017-08-30 NOTE — MR AVS SNAPSHOT
Benita POSEY Geovani   8/30/2017   Anticoagulation Therapy Visit    Description:  37 year old female   Provider:  Christy Santana, RN   Department:  [facility]           INR as of 8/30/2017     Today's INR 2.10      Anticoagulation Summary as of 8/30/2017     INR goal 2.0-3.0   Today's INR 2.10   Full instructions 12.5 mg on Mon, Wed, Fri; 10 mg all other days   Next INR check 9/6/2017    Indications   Deep vein thrombosis (DVT) (H) [I82.409] [I82.409]  Long-term (current) use of anticoagulants [Z79.01] [Z79.01]  Homozygous Factor V Leiden mutation (H) (Resolved) [D68.51]         August 2017 Details    Sun Mon Tue Wed Thu Fri Sat       1               2               3               4               5                 6               7               8               9               10               11               12                 13               14               15               16               17               18               19                 20               21               22               23               24               25               26                 27               28               29               30      12.5 mg   See details      31      10 mg            Date Details   08/30 This INR check               How to take your warfarin dose     To take:  10 mg Take 2 of the 5 mg tablets.    To take:  12.5 mg Take 2.5 of the 5 mg tablets.           September 2017 Details    Sun Mon Tue Wed Thu Fri Sat          1      12.5 mg         2      10 mg           3      10 mg         4      12.5 mg         5      10 mg         6            7               8               9                 10               11               12               13               14               15               16                 17               18               19               20               21               22               23                 24               25               26               27               28                29 30                Date Details   No additional details    Date of next INR:  9/6/2017         How to take your warfarin dose     To take:  10 mg Take 2 of the 5 mg tablets.    To take:  12.5 mg Take 2.5 of the 5 mg tablets.

## 2017-09-06 ENCOUNTER — ANTICOAGULATION THERAPY VISIT (OUTPATIENT)
Dept: ANTICOAGULATION | Facility: CLINIC | Age: 37
End: 2017-09-06

## 2017-09-06 DIAGNOSIS — Z79.01 LONG-TERM (CURRENT) USE OF ANTICOAGULANTS: ICD-10-CM

## 2017-09-06 DIAGNOSIS — I82.409 DEEP VEIN THROMBOSIS (DVT) (H): ICD-10-CM

## 2017-09-06 LAB — INR PPP: 3 (ref 0.86–1.14)

## 2017-09-06 PROCEDURE — 36416 COLLJ CAPILLARY BLOOD SPEC: CPT | Performed by: INTERNAL MEDICINE

## 2017-09-06 PROCEDURE — 85610 PROTHROMBIN TIME: CPT | Performed by: INTERNAL MEDICINE

## 2017-09-06 NOTE — MR AVS SNAPSHOT
Benita POSEY Olukenyatta   9/6/2017   Anticoagulation Therapy Visit    Description:  37 year old female   Provider:  Noemi Morgan, RN   Department:  [facility]           INR as of 9/6/2017     Today's INR 3.00      Anticoagulation Summary as of 9/6/2017     INR goal 2.0-3.0   Today's INR 3.00   Full instructions 9/6: 10 mg; Otherwise 12.5 mg on Mon, Wed, Fri; 10 mg all other days   Next INR check 9/13/2017    Indications   Deep vein thrombosis (DVT) (H) [I82.409] [I82.409]  Long-term (current) use of anticoagulants [Z79.01] [Z79.01]  Homozygous Factor V Leiden mutation (H) (Resolved) [D68.51]         September 2017 Details    Sun Mon Tue Wed Thu Fri Sat          1               2                 3               4               5               6      10 mg   See details      7      10 mg         8      12.5 mg         9      10 mg           10      10 mg         11      12.5 mg         12      10 mg         13            14               15               16                 17               18               19               20               21               22               23                 24               25               26               27               28               29               30                Date Details   09/06 This INR check       Date of next INR:  9/13/2017         How to take your warfarin dose     To take:  10 mg Take 2 of the 5 mg tablets.    To take:  12.5 mg Take 2.5 of the 5 mg tablets.

## 2017-09-06 NOTE — PROGRESS NOTES
ANTICOAGULATION FOLLOW-UP CLINIC VISIT    Patient Name:  Benita Olivo  Date:  9/6/2017  Contact Type:  Telephone    SUBJECTIVE:     Patient Findings     Positives Change in diet/appetite    Comments Pt reports she is still continuing the greens, but INR has increased within the week           OBJECTIVE    INR   Date Value Ref Range Status   09/06/2017 3.00 (H) 0.86 - 1.14 Final     Comment:     This test is intended for monitoring Coumadin therapy.  Results are not   accurate in patients with prolonged INR due to factor deficiency.         ASSESSMENT / PLAN  INR assessment THER    Recheck INR In: 1 WEEK    INR Location Clinic      Anticoagulation Summary as of 9/6/2017     INR goal 2.0-3.0   Today's INR 3.00   Maintenance plan 12.5 mg (5 mg x 2.5) on Mon, Wed, Fri; 10 mg (5 mg x 2) all other days   Full instructions 9/6: 10 mg; Otherwise 12.5 mg on Mon, Wed, Fri; 10 mg all other days   Weekly total 77.5 mg   Plan last modified Christy Santana RN (8/30/2017)   Next INR check 9/13/2017   Priority INR   Target end date     Indications   Deep vein thrombosis (DVT) (H) [I82.409] [I82.409]  Long-term (current) use of anticoagulants [Z79.01] [Z79.01]  Homozygous Factor V Leiden mutation (H) (Resolved) [D68.51]         Anticoagulation Episode Summary     INR check location     Preferred lab     Send INR reminders to [facility]    Comments patient contact number [phone]   name Rigo. Prefers Fridays for lab days and as infrequently as possible.      Anticoagulation Care Providers     Provider Role Specialty Phone number    Reed Garcia MD Responsible Hematology [phone]            See the Encounter Report to view Anticoagulation Flowsheet and Dosing Calendar (Go to Encounters tab in chart review, and find the Anticoagulation Therapy Visit)    Spoke with patient. Gave them their lab results and new warfarin recommendation.  No changes in health, medication, or diet. No missed doses,  no falls. No signs or symptoms of bleed or clotting.     Will have pt take 12.5mg MF and 10mg ROW and recheck INR within a week.     Noemi Morgan RN

## 2017-09-13 DIAGNOSIS — I82.409 DEEP VEIN THROMBOSIS (DVT) (H): ICD-10-CM

## 2017-09-13 DIAGNOSIS — Z79.01 LONG-TERM (CURRENT) USE OF ANTICOAGULANTS: ICD-10-CM

## 2017-09-13 LAB — INR PPP: 2.6 (ref 0.86–1.14)

## 2017-09-13 PROCEDURE — 85610 PROTHROMBIN TIME: CPT | Performed by: INTERNAL MEDICINE

## 2017-09-13 PROCEDURE — 36416 COLLJ CAPILLARY BLOOD SPEC: CPT | Performed by: INTERNAL MEDICINE

## 2017-09-14 ENCOUNTER — ANTICOAGULATION THERAPY VISIT (OUTPATIENT)
Dept: ANTICOAGULATION | Facility: CLINIC | Age: 37
End: 2017-09-14

## 2017-09-14 DIAGNOSIS — Z79.01 LONG-TERM (CURRENT) USE OF ANTICOAGULANTS: ICD-10-CM

## 2017-09-14 DIAGNOSIS — I82.409 DEEP VEIN THROMBOSIS (DVT) (H): ICD-10-CM

## 2017-09-14 NOTE — PROGRESS NOTES
ANTICOAGULATION FOLLOW-UP CLINIC VISIT    Patient Name:  Benita Olivo  Date:  9/14/2017  Contact Type:  Telephone    SUBJECTIVE:        OBJECTIVE    INR   Date Value Ref Range Status   09/13/2017 2.60 (H) 0.86 - 1.14 Final     Comment:     This test is intended for monitoring Coumadin therapy.  Results are not   accurate in patients with prolonged INR due to factor deficiency.         ASSESSMENT / PLAN  INR assessment THER    Recheck INR In: 2 WEEKS    INR Location Clinic      Anticoagulation Summary as of 9/14/2017     INR goal 2.0-3.0   Today's INR 2.60 (9/13/2017)   Maintenance plan 12.5 mg (5 mg x 2.5) on Mon, Wed, Fri; 10 mg (5 mg x 2) all other days   Full instructions 12.5 mg on Mon, Wed, Fri; 10 mg all other days   Weekly total 77.5 mg   No change documented Werner Frakns RN   Plan last modified Christy Santana RN (8/30/2017)   Next INR check 9/28/2017   Priority INR   Target end date     Indications   Deep vein thrombosis (DVT) (H) [I82.409] [I82.409]  Long-term (current) use of anticoagulants [Z79.01] [Z79.01]  Homozygous Factor V Leiden mutation (H) (Resolved) [D68.51]         Anticoagulation Episode Summary     INR check location     Preferred lab     Send INR reminders to [facility]    Comments patient contact number [phone]   name Rigo. Prefers Fridays for lab days and as infrequently as possible.      Anticoagulation Care Providers     Provider Role Specialty Phone number    Reed Garcia MD Responsible Hematology [phone]            See the Encounter Report to view Anticoagulation Flowsheet and Dosing Calendar (Go to Encounters tab in chart review, and find the Anticoagulation Therapy Visit)    Left message for patient with results and dosing recommendations. Asked patient to call back to report any missed doses, falls, signs and symptoms of bleeding or clotting, any changes in health, medication, or diet. Asked patient to call back with any questions or  concerns.    Werner Franks, RN

## 2017-09-14 NOTE — MR AVS SNAPSHOT
Benita POSEY Geovani   9/14/2017   Anticoagulation Therapy Visit    Description:  37 year old female   Provider:  Werner Franks RN   Department:   [facility]           INR as of 9/14/2017     Today's INR 2.60 (9/13/2017)      Anticoagulation Summary as of 9/14/2017     INR goal 2.0-3.0   Today's INR 2.60 (9/13/2017)   Full instructions 12.5 mg on Mon, Wed, Fri; 10 mg all other days   Next INR check 9/28/2017    Indications   Deep vein thrombosis (DVT) (H) [I82.409] [I82.409]  Long-term (current) use of anticoagulants [Z79.01] [Z79.01]  Homozygous Factor V Leiden mutation (H) (Resolved) [D68.51]         September 2017 Details    Sun Mon Tue Wed Thu Fri Sat          1               2                 3               4               5               6               7               8               9                 10               11               12               13               14      10 mg   See details      15      12.5 mg         16      10 mg           17      10 mg         18      12.5 mg         19      10 mg         20      12.5 mg         21      10 mg         22      12.5 mg         23      10 mg           24      10 mg         25      12.5 mg         26      10 mg         27      12.5 mg         28            29               30                Date Details   09/14 This INR check       Date of next INR:  9/28/2017         How to take your warfarin dose     To take:  10 mg Take 2 of the 5 mg tablets.    To take:  12.5 mg Take 2.5 of the 5 mg tablets.

## 2017-09-28 ENCOUNTER — ANTICOAGULATION THERAPY VISIT (OUTPATIENT)
Dept: ANTICOAGULATION | Facility: CLINIC | Age: 37
End: 2017-09-28

## 2017-09-28 DIAGNOSIS — I82.409 DEEP VEIN THROMBOSIS (DVT) (H): ICD-10-CM

## 2017-09-28 DIAGNOSIS — Z79.01 LONG-TERM (CURRENT) USE OF ANTICOAGULANTS: ICD-10-CM

## 2017-09-28 LAB — INR PPP: 2.5 (ref 0.86–1.14)

## 2017-09-28 PROCEDURE — 85610 PROTHROMBIN TIME: CPT | Performed by: INTERNAL MEDICINE

## 2017-09-28 PROCEDURE — 36416 COLLJ CAPILLARY BLOOD SPEC: CPT | Performed by: INTERNAL MEDICINE

## 2017-09-28 NOTE — PROGRESS NOTES
ANTICOAGULATION FOLLOW-UP CLINIC VISIT    Patient Name:  Benita Olivo  Date:  9/28/2017  Contact Type:  Telephone    SUBJECTIVE:     Patient Findings     Comments Pt reports she has been taking 12.5mg MF and 10mg ROW for the last two readings. Will change calendar to reflect this. Pt requests to go longer in between INR draws since this is the 3rd INR within goal range ok with this           OBJECTIVE    INR   Date Value Ref Range Status   09/28/2017 2.50 (H) 0.86 - 1.14 Final     Comment:     This test is intended for monitoring Coumadin therapy.  Results are not   accurate in patients with prolonged INR due to factor deficiency.         ASSESSMENT / PLAN  INR assessment THER    Recheck INR In: 4 WEEKS    INR Location Outside lab      Anticoagulation Summary as of 9/28/2017     INR goal 2.0-3.0   Today's INR 2.50   Maintenance plan 12.5 mg (5 mg x 2.5) on Mon, Fri; 10 mg (5 mg x 2) all other days   Full instructions 12.5 mg on Mon, Fri; 10 mg all other days   Weekly total 75 mg   Plan last modified Noemi Morgan RN (9/28/2017)   Next INR check 10/26/2017   Priority INR   Target end date     Indications   Deep vein thrombosis (DVT) (H) [I82.409] [I82.409]  Long-term (current) use of anticoagulants [Z79.01] [Z79.01]  Homozygous Factor V Leiden mutation (H) (Resolved) [D68.51]         Anticoagulation Episode Summary     INR check location     Preferred lab     Send INR reminders to [facility]    Comments patient contact number [phone]   name Rigo. Prefers Fridays for lab days and as infrequently as possible.      Anticoagulation Care Providers     Provider Role Specialty Phone number    Reed Garcia MD Responsible Hematology [phone]            See the Encounter Report to view Anticoagulation Flowsheet and Dosing Calendar (Go to Encounters tab in chart review, and find the Anticoagulation Therapy Visit)    Spoke with patient. Gave them their lab results and new warfarin  recommendation.  No changes in health, medication, or diet. No missed doses, no falls. No signs or symptoms of bleed or clotting.     Noemi Morgan RN

## 2017-10-23 ENCOUNTER — ANTICOAGULATION THERAPY VISIT (OUTPATIENT)
Dept: ANTICOAGULATION | Facility: CLINIC | Age: 37
End: 2017-10-23

## 2017-10-23 DIAGNOSIS — I82.409 DEEP VEIN THROMBOSIS (DVT) (H): ICD-10-CM

## 2017-10-23 DIAGNOSIS — Z79.01 LONG-TERM (CURRENT) USE OF ANTICOAGULANTS: ICD-10-CM

## 2017-10-23 NOTE — MR AVS SNAPSHOT
Benita TATY Olivo   10/23/2017   Anticoagulation Therapy Visit    Description:  37 year old female   Provider:  Efren Wade, [facility]   Department:  [facility]           INR as of 10/23/2017     Today's INR       Anticoagulation Summary as of 10/23/2017     INR goal 2.0-3.0   Today's INR    Full instructions 12.5 mg on Mon, Fri; 10 mg all other days   Next INR check 7/4/2018    Indications   Deep vein thrombosis (DVT) (H) [I82.409] [I82.409]  Long-term (current) use of anticoagulants [Z79.01] [Z79.01]  Homozygous Factor V Leiden mutation (H) (Resolved) [D68.51]         October 2017 Details    Sun Mon Tue Wed Thu Fri Sat     1               2               3               4               5               6               7                 8               9               10               11               12               13               14                 15               16               17               18               19               20               21                 22               23      12.5 mg   See details      24      10 mg         25      10 mg         26      10 mg         27      12.5 mg         28      10 mg           29      10 mg         30      12.5 mg         31      10 mg              Date Details   10/23 This INR check               How to take your warfarin dose     To take:  10 mg Take 2 of the 5 mg tablets.    To take:  12.5 mg Take 2.5 of the 5 mg tablets.           November 2017 Details    Sun Mon Tue Wed Thu Fri Sat        1      10 mg         2      10 mg         3      12.5 mg         4      10 mg           5      10 mg         6      12.5 mg         7      10 mg         8      10 mg         9      10 mg         10      12.5 mg         11      10 mg           12      10 mg         13      12.5 mg         14      10 mg         15      10 mg         16      10 mg         17      12.5 mg         18      10 mg           19      10 mg         20      12.5 mg         21      10  mg         22      10 mg         23      10 mg         24      12.5 mg         25      10 mg           26      10 mg         27      12.5 mg         28      10 mg         29      10 mg         30      10 mg            Date Details   No additional details            How to take your warfarin dose     To take:  10 mg Take 2 of the 5 mg tablets.    To take:  12.5 mg Take 2.5 of the 5 mg tablets.           December 2017 Details    Sun Mon Tue Wed Thu Fri Sat          1      12.5 mg         2      10 mg           3      10 mg         4      12.5 mg         5      10 mg         6      10 mg         7      10 mg         8      12.5 mg         9      10 mg           10      10 mg         11      12.5 mg         12      10 mg         13      10 mg         14      10 mg         15      12.5 mg         16      10 mg           17      10 mg         18      12.5 mg         19      10 mg         20      10 mg         21      10 mg         22      12.5 mg         23      10 mg           24      10 mg         25      12.5 mg         26      10 mg         27      10 mg         28      10 mg         29      12.5 mg         30      10 mg           31      10 mg                Date Details   No additional details            How to take your warfarin dose     To take:  10 mg Take 2 of the 5 mg tablets.    To take:  12.5 mg Take 2.5 of the 5 mg tablets.           January 2018 Details    Sun Mon Tue Wed Thu Fri Sat      1      12.5 mg         2      10 mg         3      10 mg         4      10 mg         5      12.5 mg         6      10 mg           7      10 mg         8      12.5 mg         9      10 mg         10      10 mg         11      10 mg         12      12.5 mg         13      10 mg           14      10 mg         15      12.5 mg         16      10 mg         17      10 mg         18      10 mg         19      12.5 mg         20      10 mg           21      10 mg         22      12.5 mg         23      10 mg         24      10  mg         25      10 mg         26      12.5 mg         27      10 mg           28      10 mg         29      12.5 mg         30      10 mg         31      10 mg             Date Details   No additional details            How to take your warfarin dose     To take:  10 mg Take 2 of the 5 mg tablets.    To take:  12.5 mg Take 2.5 of the 5 mg tablets.           February 2018 Details    Sun Mon Tue Wed Thu Fri Sat         1      10 mg         2      12.5 mg         3      10 mg           4      10 mg         5      12.5 mg         6      10 mg         7      10 mg         8      10 mg         9      12.5 mg         10      10 mg           11      10 mg         12      12.5 mg         13      10 mg         14      10 mg         15      10 mg         16      12.5 mg         17      10 mg           18      10 mg         19      12.5 mg         20      10 mg         21      10 mg         22      10 mg         23      12.5 mg         24      10 mg           25      10 mg         26      12.5 mg         27      10 mg         28      10 mg             Date Details   No additional details            How to take your warfarin dose     To take:  10 mg Take 2 of the 5 mg tablets.    To take:  12.5 mg Take 2.5 of the 5 mg tablets.           March 2018 Details    Sun Mon Tue Wed Thu Fri Sat         1      10 mg         2      12.5 mg         3      10 mg           4      10 mg         5      12.5 mg         6      10 mg         7      10 mg         8      10 mg         9      12.5 mg         10      10 mg           11      10 mg         12      12.5 mg         13      10 mg         14      10 mg         15      10 mg         16      12.5 mg         17      10 mg           18      10 mg         19      12.5 mg         20      10 mg         21      10 mg         22      10 mg         23      12.5 mg         24      10 mg           25      10 mg         26      12.5 mg         27      10 mg         28      10 mg         29      10 mg          30      12.5 mg         31      10 mg          Date Details   No additional details            How to take your warfarin dose     To take:  10 mg Take 2 of the 5 mg tablets.    To take:  12.5 mg Take 2.5 of the 5 mg tablets.           April 2018 Details    Sun Mon Tue Wed Thu Fri Sat     1      10 mg         2      12.5 mg         3      10 mg         4      10 mg         5      10 mg         6      12.5 mg         7      10 mg           8      10 mg         9      12.5 mg         10      10 mg         11      10 mg         12      10 mg         13      12.5 mg         14      10 mg           15      10 mg         16      12.5 mg         17      10 mg         18      10 mg         19      10 mg         20      12.5 mg         21      10 mg           22      10 mg         23      12.5 mg         24      10 mg         25      10 mg         26      10 mg         27      12.5 mg         28      10 mg           29      10 mg         30      12.5 mg              The date of next INR is over 6 months in the future. The calendar will only display doses for 6 months after the visit date. Please contact your provider to verify the correct doses.    Date Details   No additional details    Date of next INR:  7/4/2018         How to take your warfarin dose     To take:  10 mg Take 2 of the 5 mg tablets.    To take:  12.5 mg Take 2.5 of the 5 mg tablets.

## 2017-10-23 NOTE — PROGRESS NOTES
Spoke with Benita today. She had a positive pregnancy test. She said that she spoke with Ann Torres RN and he said to start Lovenox 100mgs subq every 12 hours.  An RX was sent to her [facility].

## 2017-11-15 ENCOUNTER — OFFICE VISIT (OUTPATIENT)
Dept: OBGYN | Facility: CLINIC | Age: 37
End: 2017-11-15
Attending: ADVANCED PRACTICE MIDWIFE
Payer: COMMERCIAL

## 2017-11-15 VITALS
HEIGHT: 71 IN | BODY MASS INDEX: 30.38 KG/M2 | HEART RATE: 86 BPM | WEIGHT: 217 LBS | SYSTOLIC BLOOD PRESSURE: 100 MMHG | DIASTOLIC BLOOD PRESSURE: 71 MMHG

## 2017-11-15 DIAGNOSIS — Z98.890 HISTORY OF CONIZATION OF CERVIX: ICD-10-CM

## 2017-11-15 DIAGNOSIS — Z36.89 ENCOUNTER FOR FETAL ANATOMIC SURVEY: ICD-10-CM

## 2017-11-15 DIAGNOSIS — O09.529 SUPERVISION OF HIGH-RISK PREGNANCY OF ELDERLY MULTIGRAVIDA: Primary | ICD-10-CM

## 2017-11-15 DIAGNOSIS — Z34.81 ENCOUNTER FOR SUPERVISION OF OTHER NORMAL PREGNANCY IN FIRST TRIMESTER: Primary | ICD-10-CM

## 2017-11-15 DIAGNOSIS — Z79.01 CURRENT USE OF LONG TERM ANTICOAGULATION: Primary | ICD-10-CM

## 2017-11-15 DIAGNOSIS — Z79.01 CURRENT USE OF LONG TERM ANTICOAGULATION: ICD-10-CM

## 2017-11-15 LAB
ABO + RH BLD: NORMAL
ABO + RH BLD: NORMAL
BASOPHILS # BLD AUTO: 0 10E9/L (ref 0–0.2)
BASOPHILS NFR BLD AUTO: 0.4 %
BLD GP AB SCN SERPL QL: NORMAL
BLOOD BANK CMNT PATIENT-IMP: NORMAL
DEPRECATED CALCIDIOL+CALCIFEROL SERPL-MC: 40 UG/L (ref 20–75)
DIFFERENTIAL METHOD BLD: NORMAL
EOSINOPHIL # BLD AUTO: 0.1 10E9/L (ref 0–0.7)
EOSINOPHIL NFR BLD AUTO: 1.4 %
ERYTHROCYTE [DISTWIDTH] IN BLOOD BY AUTOMATED COUNT: 12.6 % (ref 10–15)
HBV SURFACE AG SERPL QL IA: NONREACTIVE
HCT VFR BLD AUTO: 41.1 % (ref 35–47)
HGB BLD-MCNC: 13.6 G/DL (ref 11.7–15.7)
HIV 1+2 AB+HIV1 P24 AG SERPL QL IA: NONREACTIVE
IMM GRANULOCYTES # BLD: 0 10E9/L (ref 0–0.4)
IMM GRANULOCYTES NFR BLD: 0.4 %
LMWH PPP CHRO-ACNC: 1.08 IU/ML
LYMPHOCYTES # BLD AUTO: 1.8 10E9/L (ref 0.8–5.3)
LYMPHOCYTES NFR BLD AUTO: 36 %
MCH RBC QN AUTO: 31.1 PG (ref 26.5–33)
MCHC RBC AUTO-ENTMCNC: 33.1 G/DL (ref 31.5–36.5)
MCV RBC AUTO: 94 FL (ref 78–100)
MONOCYTES # BLD AUTO: 0.6 10E9/L (ref 0–1.3)
MONOCYTES NFR BLD AUTO: 12.2 %
NEUTROPHILS # BLD AUTO: 2.5 10E9/L (ref 1.6–8.3)
NEUTROPHILS NFR BLD AUTO: 49.6 %
NRBC # BLD AUTO: 0 10*3/UL
NRBC BLD AUTO-RTO: 0 /100
PLATELET # BLD AUTO: 180 10E9/L (ref 150–450)
RBC # BLD AUTO: 4.37 10E12/L (ref 3.8–5.2)
RUBV IGG SERPL IA-ACNC: 26 IU/ML
SPECIMEN EXP DATE BLD: NORMAL
T PALLIDUM IGG+IGM SER QL: NEGATIVE
WBC # BLD AUTO: 5.1 10E9/L (ref 4–11)

## 2017-11-15 PROCEDURE — 87340 HEPATITIS B SURFACE AG IA: CPT | Performed by: ADVANCED PRACTICE MIDWIFE

## 2017-11-15 PROCEDURE — 36415 COLL VENOUS BLD VENIPUNCTURE: CPT | Performed by: ADVANCED PRACTICE MIDWIFE

## 2017-11-15 PROCEDURE — 86780 TREPONEMA PALLIDUM: CPT | Performed by: ADVANCED PRACTICE MIDWIFE

## 2017-11-15 PROCEDURE — 82306 VITAMIN D 25 HYDROXY: CPT | Performed by: ADVANCED PRACTICE MIDWIFE

## 2017-11-15 PROCEDURE — 86901 BLOOD TYPING SEROLOGIC RH(D): CPT | Performed by: ADVANCED PRACTICE MIDWIFE

## 2017-11-15 PROCEDURE — 36415 COLL VENOUS BLD VENIPUNCTURE: CPT | Performed by: INTERNAL MEDICINE

## 2017-11-15 PROCEDURE — 85025 COMPLETE CBC W/AUTO DIFF WBC: CPT | Performed by: ADVANCED PRACTICE MIDWIFE

## 2017-11-15 PROCEDURE — 87389 HIV-1 AG W/HIV-1&-2 AB AG IA: CPT | Performed by: ADVANCED PRACTICE MIDWIFE

## 2017-11-15 PROCEDURE — 85520 HEPARIN ASSAY: CPT | Performed by: INTERNAL MEDICINE

## 2017-11-15 PROCEDURE — 87086 URINE CULTURE/COLONY COUNT: CPT | Performed by: ADVANCED PRACTICE MIDWIFE

## 2017-11-15 PROCEDURE — 86900 BLOOD TYPING SEROLOGIC ABO: CPT | Performed by: ADVANCED PRACTICE MIDWIFE

## 2017-11-15 PROCEDURE — 86762 RUBELLA ANTIBODY: CPT | Performed by: ADVANCED PRACTICE MIDWIFE

## 2017-11-15 PROCEDURE — 86850 RBC ANTIBODY SCREEN: CPT | Performed by: ADVANCED PRACTICE MIDWIFE

## 2017-11-15 ASSESSMENT — PAIN SCALES - GENERAL: PAINLEVEL: NO PAIN (0)

## 2017-11-15 NOTE — PROGRESS NOTES
Subjective:  37 year old female who presents to clinic for initiation of OB care.   at 8w2d with estimated date of delivery of 2018 based on LMP.  Pregnancy is planned.  Symptoms since LMP include nausea.  Patient has tried these relief measures: increased fluids.  Co-morbids: history of PE, DVT, Factor V, hx of abnormal pap with conization  Medications: prenatal vitamins, Vitamin D, Lovenox  Reviewed dating ultrasound.     PERSONAL/SOCIAL HISTORY  Lives with their family. JAXSON, Rigo, involved.  Employment: Full time, at [employer].  Her job involves light activity .  Additional items: None    Objective  -VS: reviewed and within normal limits   -General appearance: no acute distress, patient is comfortable   NEUROLOGICAL/PSYCHIATRIC   - Orientated x3,   -Mood and affect: : normal   Genetic/Infection questionnaire completed, risks include family history of Factor V.    Assessment/Plan   at 8w2d  by Patient's last menstrual period was 2017. and US confirms.  Encounter Diagnoses   Name Primary?     Supervision of high-risk pregnancy of elderly multigravida Yes     Encounter for fetal anatomic survey      Orders Placed This Encounter   Procedures     25- [place]-Vitamin D     Anti Treponema     CBC with Platelets Differential     Hepatitis B Surface Antigen     HIV Antigen Antibody Combo     Rubella Antibody IgG Quantitative     [facility] REFERRAL-PREGNANCY     ABO/Rh Type and Screen     Orders Placed This Encounter   Medications     VITAMIN D, CHOLECALCIFEROL, PO     Sig: Take 5,000 Units by mouth daily     - Oriented to Practice, types of care, and how to reach a provider. Discussed MD care due to high risk pregnancy.  - Patient received 1st trimester new OB education packet complete with aide of [facility] booklet including information on genetic screening test options.    - Patient desires level II ultrasound which was ordered.  - Patient was encouraged to start  prenatal vitamins as tolerated.    - Patient was sent to lab for routine OB labs.    - Pregnancy concerns to be addressed by provider at new OB exam include: needs GC/CT, possibly pap, at next visit, cervical length measurements beginning at 16 weeks.  - Patient instructed to schedule new OB exam with provider in 2-3 weeks.

## 2017-11-15 NOTE — MR AVS SNAPSHOT
After Visit Summary   11/15/2017    Benita Olivo    MRN: [id number]           Patient Information     Date Of Birth          1980        Visit Information        Provider Department      11/15/2017 8:30 AM Reza Monteiro APRN CNM [facility]        Today's Diagnoses     Supervision of high-risk pregnancy of elderly multigravida    -  1    Encounter for fetal anatomic survey        History of conization of cervix           Follow-ups after your visit        Additional Services     MAT FETAL MED CTR REFERRAL-PREGNANCY       >> Patient may proceed with recommendations for further testing as directed by the Maternal Fetal Medicine Specialist >>    >> If requesting Fetal Echo: MFM will determine appropriate location for exam due to indication.    >> If requesting Lung Maturity Amnio:  If results indicate fetal lung maturity, induction or C/S is recommended within 36 hours.  Please schedule accordingly.     Dear Patient:   Please be aware that coverage of these services is subject to the terms and limitations of your health insurance plan.  Call member services at your health plan with any benefit or coverage questions.      Please bring the following to your appointment:    >>  Any x-rays, CTs or MRIs which have been performed.  Contact the facility where they were done to arrange for  prior to your scheduled appointment.  Any new CT, MRI or other procedures ordered by your specialist must be performed at a [place] facility or coordinated by your clinic's referral office.  >>  List of current medications   >>  This referral request   >>  Any documents/labs given to you for this referral                  Follow-up notes from your care team     Return in about 2 weeks (around 11/29/2017) for New OB Visit.      Future tests that were ordered for you today     Open Future Orders        Priority Expected Expires Ordered    [facility] Comprehensive Single Routine  9/15/2018  "11/15/2017    Dating ultrasound less than 14 weeks gestation Transvag  - 20001 (In Clinic) Routine  3/15/2018 11/15/2017            Who to contact     Please call your clinic at [phone] to:    Ask questions about your health    Make or cancel appointments    Discuss your medicines    Learn about your test results    Speak to your doctor   If you have compliments or concerns about an experience at your clinic, or if you wish to file a complaint, please contact [facility] Patient Relations at [phone] or email us at [email or web address]         Additional Information About Your Visit        [email or web address] [place] Information     Tweet[employer]e gives you secure access to your electronic health record. If you see a primary care provider, you can also send messages to your care team and make appointments. If you have questions, please call your primary care clinic.  If you do not have a primary care provider, please call [phone] and they will assist you.      TweetMeme is an electronic gateway that provides easy, online access to your medical records. With Tweet[employer]e, you can request a clinic appointment, read your test results, renew a prescription or communicate with your care team.     To access your existing account, please contact your [facility] or call [phone] for assistance.        Care EveryWhere ID     This is your Care EveryWhere ID. This could be used by other organizations to access your [place] medical records  [id number]        Your Vitals Were     Pulse Height Last Period Breastfeeding? BMI (Body Mass Index)       86 1.803 m (5' 11\") 09/18/2017 No 30.27 kg/m2        Blood Pressure from Last 3 Encounters:   11/15/17 100/71   07/15/16 (!) 88/56   06/06/16 111/73    Weight from Last 3 Encounters:   11/15/17 98.4 kg (217 lb)   07/15/16 98 kg (216 lb)   06/02/16 108 kg (238 lb)              We Performed the Following     25- OH-Vitamin D     " ABO/Rh Type and Screen     Anti Treponema     CBC with Platelets Differential     Hepatitis B Surface Antigen     HIV Antigen Antibody Combo     MAT FETAL MED CTR REFERRAL-PREGNANCY     Rubella Antibody IgG Quantitative     Urine Culture Aerobic Bacterial          Today's Medication Changes          These changes are accurate as of: 11/15/17  3:06 PM.  If you have any questions, ask your nurse or doctor.               Stop taking these medicines if you haven't already. Please contact your care team if you have questions.     mupirocin 2 % cream   Commonly known as:  BACTROBAN   Stopped by:  Reza Monteiro APRN CNM           warfarin 5 MG tablet   Commonly known as:  COUMADIN   Stopped by:  Reza Monteiro APRN CNM                    Primary Care Provider Office Phone # Fax #    Reed Isacc Garcia [phone]       [address] [place] [address]        Equal Access to Services     [facility]: Hadii aad ku hadasho Soomaali, waaxda luqadaha, qaybta kaalmada adenannetteyajackie, genevieve mejia . So [facility] [phone].    ATENCIÓN: Si habla español, tiene a pearce disposición servicios gratuitos de asistencia lingüística. [facility] [phone].    We comply with applicable federal civil rights laws and [place] laws. We do not discriminate on the basis of race, color, national origin, age, disability, sex, sexual orientation, or gender identity.            Thank you!     Thank you for choosing [facility]  for your care. Our goal is always to provide you with excellent care. Hearing back from our patients is one way we can continue to improve our services. Please take a few minutes to complete the written survey that you may receive in the mail after your visit with us. Thank you!             Your Updated Medication List - Protect others around you: Learn how to safely use, store and throw away your medicines at www.disposemymeds.org.          This  list is accurate as of: 11/15/17  3:06 PM.  Always use your most recent med list.                   Brand Name Dispense Instructions for use Diagnosis    enoxaparin 100 MG/ML injection    LOVENOX    60 Syringe    Inject 1 mL (100 mg) Subcutaneous every 12 hours    Deep vein thrombosis (DVT) (H), Long-term (current) use of anticoagulants       ferrous sulfate 325 (65 FE) MG tablet    IRON    30 tablet    Take 1 tablet (325 mg) by mouth daily (with breakfast)     (normal spontaneous vaginal delivery)       PRENATAL VITAMIN PO           VITAMIN D (CHOLECALCIFEROL) PO      Take 5,000 Units by mouth daily

## 2017-11-15 NOTE — LETTER
11/15/2017       RE: Benita Olivo  [address] [facility] [address]     Dear Colleague,    Thank you for referring your patient, Benita Olivo, to the [facility] at [facility]. Please see a copy of my visit note below.    Subjective:  37 year old female who presents to clinic for initiation of OB care.   at 8w2d with estimated date of delivery of 2018 based on LMP.  Pregnancy is planned.  Symptoms since LMP include nausea.  Patient has tried these relief measures: increased fluids.  Co-morbids: history of PE, DVT, Factor V, hx of abnormal pap with conization  Medications: prenatal vitamins, Vitamin D, Lovenox  Reviewed dating ultrasound.     PERSONAL/SOCIAL HISTORY  Lives with their family. Rigo PURI, involved.  Employment: Full time, at [employer].  Her job involves light activity .  Additional items: None    Objective  -VS: reviewed and within normal limits   -General appearance: no acute distress, patient is comfortable   NEUROLOGICAL/PSYCHIATRIC   - Orientated x3,   -Mood and affect: : normal   Genetic/Infection questionnaire completed, risks include family history of Factor V.    Assessment/Plan   at 8w2d  by Patient's last menstrual period was 2017. and US confirms.  Encounter Diagnoses   Name Primary?     Supervision of high-risk pregnancy of elderly multigravida Yes     Encounter for fetal anatomic survey      Orders Placed This Encounter   Procedures     25- OH-Vitamin D     Anti Treponema     CBC with Platelets Differential     Hepatitis B Surface Antigen     HIV Antigen Antibody Combo     Rubella Antibody IgG Quantitative     MAT FETAL MED CTR REFERRAL-PREGNANCY     ABO/Rh Type and Screen     Orders Placed This Encounter   Medications     VITAMIN D, CHOLECALCIFEROL, PO     Sig: Take 5,000 Units by mouth daily     - Oriented to Practice, types of care, and how to reach a provider. Discussed MD  care due to high risk pregnancy.  - Patient received 1st trimester new OB education packet complete with aide of The Expectant Family booklet including information on genetic screening test options.    - Patient desires level II ultrasound which was ordered.  - Patient was encouraged to start prenatal vitamins as tolerated.    - Patient was sent to lab for routine OB labs.    - Pregnancy concerns to be addressed by provider at new OB exam include: needs GC/CT, possibly pap, at next visit, cervical length measurements beginning at 16 weeks.  - Patient instructed to schedule new OB exam with provider in 2-3 weeks.      Again, thank you for allowing me to participate in the care of your patient.      Sincerely,    CHAKA Quevedo CNM

## 2017-11-15 NOTE — PROGRESS NOTES
37 year old female, , with LMP 17, 8 2/7weeks  Estimated Date of Delivery: 18. Presents for confirmation of dates and assessment of viability. This study was done transvaginally.    Measurements     CRL = 18.8 mm = 8 3/7 weeks  EGA.   RIVAS = 18.     Fetal anatomy appears normal for gestational age.     Fetal/Fetal Cardiac Activity: Present.  FHR = 185bpm.     Implantation: Intrauterine.     Cervix = 3.8 cm      Maternal structures appear normal.    Impression: viable swenson Iintrauterine pregnancy    Recommend comprehensive scan at 18 to 20 weeks.    RACHELL Orourke MD

## 2017-11-15 NOTE — LETTER
11/15/2017       RE: Benita Olivo  [address] [facility] [address]     Dear Colleague,    Thank you for referring your patient, Benita Olivo, to the [facility] at [facility]. Please see a copy of my visit note below.    37 year old female, , with LMP 17, 8 2/7weeks  Estimated Date of Delivery: 18. Presents for confirmation of dates and assessment of viability. This study was done transvaginally.    Measurements     CRL = 18.8 mm = 8 3/7 weeks  EGA.   RIVAS = 18.     Fetal anatomy appears normal for gestational age.     Fetal/Fetal Cardiac Activity: Present.  FHR = 185bpm.     Implantation: Intrauterine.     Cervix = 3.8 cm      Maternal structures appear normal.    Impression: viable swenson Iintrauterine pregnancy    Recommend comprehensive scan at 18 to 20 weeks.    RACHELL Orourke MD        Again, thank you for allowing me to participate in the care of your patient.      Sincerely,    [facility]

## 2017-11-15 NOTE — MR AVS SNAPSHOT
After Visit Summary   11/15/2017    Benita Olivo    MRN: [id number]           Patient Information     Date Of Birth          1980        Visit Information        Provider Department      11/15/2017 8:00 AM [facility] ULTRASOUND [facility]        Today's Diagnoses     Encounter for supervision of other normal pregnancy in first trimester    -  1       Follow-ups after your visit        Future tests that were ordered for you today     Open Future Orders        Priority Expected Expires Ordered    MFM US Comprehensive Single Routine  9/15/2018 11/15/2017    Dating ultrasound less than 14 weeks gestation Transvag  - 78[address] (In Clinic) Routine  3/15/2018 11/15/2017            Who to contact     Please call your clinic at [phone] to:    Ask questions about your health    Make or cancel appointments    Discuss your medicines    Learn about your test results    Speak to your doctor   If you have compliments or concerns about an experience at your clinic, or if you wish to file a complaint, please contact [facility] Patient Relations at [phone] or email us at [email or web address]         Additional Information About Your Visit        MyChart Information     Cord Project[employer] gives you secure access to your electronic health record. If you see a primary care provider, you can also send messages to your care team and make appointments. If you have questions, please call your primary care clinic.  If you do not have a primary care provider, please call [phone] and they will assist you.      [employer] is an electronic gateway that provides easy, online access to your medical records. With [employer], you can request a clinic appointment, read your test results, renew a prescription or communicate with your care team.     To access your existing account, please contact your [facility] or call [phone] for assistance.         Care EveryWhere ID     This is your Care EveryWhere ID. This could be used by other organizations to access your [place] medical records  [id number]        Your Vitals Were     Last Period                   09/18/2017            Blood Pressure from Last 3 Encounters:   11/15/17 100/71   07/15/16 (!) 88/56   06/06/16 111/73    Weight from Last 3 Encounters:   11/15/17 98.4 kg (217 lb)   07/15/16 98 kg (216 lb)   06/02/16 108 kg (238 lb)                 Today's Medication Changes          These changes are accurate as of: 11/15/17  1:43 PM.  If you have any questions, ask your nurse or doctor.               Stop taking these medicines if you haven't already. Please contact your care team if you have questions.     mupirocin 2 % cream   Commonly known as:  BACTROBAN   Stopped by:  Reza Monteiro APRN CNM           warfarin 5 MG tablet   Commonly known as:  COUMADIN   Stopped by:  Reza Monteiro APRN CNM                    Primary Care Provider Office Phone # Fax #    Reed Isacc Garcia [phone]       [address] [place] [address]  [facility] [address]        Equal Access to Services     [facility]: Hadii aad ku hadasho Soomaali, waaxda luqadaha, qaybta kaalmada adeegyada, genevieve patel. So [facility] [phone].    ATENCIÓN: Si habla español, tiene a pearce disposición servicios gratuitos de asistencia lingüística. Llame al [phone].    We comply with applicable federal civil rights laws and [place] laws. We do not discriminate on the basis of race, color, national origin, age, disability, sex, sexual orientation, or gender identity.            Thank you!     Thank you for choosing [facility]  for your care. Our goal is always to provide you with excellent care. Hearing back from our patients is one way we can continue to improve our services. Please take a few minutes to complete the written survey that you may receive in the mail after your  visit with us. Thank you!             Your Updated Medication List - Protect others around you: Learn how to safely use, store and throw away your medicines at www.disposemymeds.org.          This list is accurate as of: 11/15/17  1:43 PM.  Always use your most recent med list.                   Brand Name Dispense Instructions for use Diagnosis    enoxaparin 100 MG/ML injection    LOVENOX    60 Syringe    Inject 1 mL (100 mg) Subcutaneous every 12 hours    Deep vein thrombosis (DVT) (H), Long-term (current) use of anticoagulants       ferrous sulfate 325 (65 FE) MG tablet    IRON    30 tablet    Take 1 tablet (325 mg) by mouth daily (with breakfast)     (normal spontaneous vaginal delivery)       PRENATAL VITAMIN PO           VITAMIN D (CHOLECALCIFEROL) PO      Take 5,000 Units by mouth daily

## 2017-11-16 LAB
BACTERIA SPEC CULT: NORMAL
Lab: NORMAL
SPECIMEN SOURCE: NORMAL

## 2017-12-18 ENCOUNTER — OFFICE VISIT (OUTPATIENT)
Dept: OBGYN | Facility: CLINIC | Age: 37
End: 2017-12-18
Attending: MIDWIFE
Payer: COMMERCIAL

## 2017-12-18 VITALS
DIASTOLIC BLOOD PRESSURE: 65 MMHG | SYSTOLIC BLOOD PRESSURE: 103 MMHG | WEIGHT: 219 LBS | HEIGHT: 71 IN | HEART RATE: 86 BPM | BODY MASS INDEX: 30.66 KG/M2

## 2017-12-18 DIAGNOSIS — O09.811 PREGNANCY RESULTING FROM ASSISTED REPRODUCTIVE TECHNOLOGY IN FIRST TRIMESTER: ICD-10-CM

## 2017-12-18 DIAGNOSIS — Z98.890 HISTORY OF CONIZATION OF CERVIX: ICD-10-CM

## 2017-12-18 DIAGNOSIS — O09.529 SUPERVISION OF HIGH-RISK PREGNANCY OF ELDERLY MULTIGRAVIDA: ICD-10-CM

## 2017-12-18 PROCEDURE — 40000809 ZZH STATISTIC NO DOCUMENTATION TO SUPPORT CHARGE

## 2017-12-18 PROCEDURE — 87591 N.GONORRHOEAE DNA AMP PROB: CPT | Performed by: MIDWIFE

## 2017-12-18 PROCEDURE — G0145 SCR C/V CYTO,THINLAYER,RESCR: HCPCS | Performed by: MIDWIFE

## 2017-12-18 PROCEDURE — 87491 CHLMYD TRACH DNA AMP PROBE: CPT | Performed by: MIDWIFE

## 2017-12-18 PROCEDURE — 87624 HPV HI-RISK TYP POOLED RSLT: CPT | Performed by: MIDWIFE

## 2017-12-18 ASSESSMENT — PAIN SCALES - GENERAL: PAINLEVEL: NO PAIN (0)

## 2017-12-18 NOTE — MR AVS SNAPSHOT
"              After Visit Summary   12/18/2017    Benita Olivo    MRN: [id number]           Patient Information     Date Of Birth          1980        Visit Information        Provider Department      12/18/2017 1:15 PM Shilpa Adkins APRN CNM [facility]        Today's Diagnoses     Supervision of high-risk pregnancy of elderly multigravida        Pregnancy resulting from assisted reproductive technology in first trimester        History of conization of cervix          Care Instructions      Thank you for choosing [facility] for your obstetrical care.  We are an integrated health clinic with obstetricians, midwives, a psychologist, an acupuncturist, a nutritionist, a pharmacist, internal medicine and family practice all in one.  If you have questions about services offered please ask.      o Please keep all appointments with your provider.  You will help catch, prevent and treat problems early.  o Review your Expectant Family booklet and folder given to you at this intake visit.  It can answer many basic questions including:    Treatment for nausea and vomiting    Medications that are safe in pregnancy    Healthy diet and weight gain    Exercise and activity  o ASK questions!!  Please use \"Questions for my New OB visit\" form to write down any questions or concerns for your next visit.  o Eat a healthy diet.  Visit www.choosemyplate.gov and click on  Pregnancy and Breastfeeding  for information and tips  o Do not smoke.  Avoid other people's smoke, too.  We are happy to help with referrals to stop smoking programs.  o Do not drink alcohol.  o Try to avoid people who have colds or other infections.  Practice good hand washing.  o Consider registering for our Healthy Pregnancy Class here at [facility].  This class is offered every 3rd Wednesday from 2:30-4:30 p.m.  [place] at [phone] or online at [email or web address] ([email or web address]) or " [email or web address]  o Consider registering for prenatal education classes through [employer] at [facility].  You can view class schedules and register online at [email or web address] or call [phone] for questions     For urgent concerns, call [facility] at [phone] to speak with a triage nurse during regular clinic hours (8:00 am - 4:30 pm).  This line is answered by our service 24 hours a day, after hours a provider will return your call.          Follow-ups after your visit        Follow-up notes from your care team     Return in about 4 weeks (around 1/15/2018) for next OB visit, JEROME.      Your next 10 appointments already scheduled     Puma 15, 2018  8:15 AM CST   RETURN OB with Junie Hightower MD   [facility] ([facility])    [facility] [address]  [address]  [facility] [address] [phone]            Jan 22, 2018  8:45 AM CST   MFM US COMP with [id number]   [facility])    [address]  [facility] [id number] [phone]           Wear comfortable clothes and leave your valuables at home.            Jan 22, 2018  9:15 AM CST   Radiology MD with JULIO GALAVIZ MD   [facility])    [address]  [facility] [address] [phone]           Please arrive at the time given for your first appointment. This visit is used internally to schedule the physician's time during your ultrasound.              Who to contact     Please call your clinic at [phone] to:    Ask questions about your health    Make or cancel appointments    Discuss your medicines    Learn about your test results    Speak to your doctor   If you have compliments or concerns about an experience at your clinic, or if you wish to file a  "complaint, please contact [facility] Patient Relations at [phone] or email us at [email or web address]         Additional Information About Your Visit        m-Care TechnologyharConnect Controls Information     [employer] gives you secure access to your electronic health record. If you see a primary care provider, you can also send messages to your care team and make appointments. If you have questions, please call your primary care clinic.  If you do not have a primary care provider, please call [phone] and they will assist you.      [employer] is an electronic gateway that provides easy, online access to your medical records. With [employer], you can request a clinic appointment, read your test results, renew a prescription or communicate with your care team.     To access your existing account, please contact your [facility] or call [phone] for assistance.        Care EveryWhere ID     This is your Care EveryWhere ID. This could be used by other organizations to access your [place] medical records  [id number]        Your Vitals Were     Pulse Height Last Period BMI (Body Mass Index)          86 1.803 m (5' 10.98\") 09/18/2017 30.56 kg/m2         Blood Pressure from Last 3 Encounters:   12/18/17 103/65   11/15/17 100/71   07/15/16 (!) 88/56    Weight from Last 3 Encounters:   12/18/17 99.3 kg (219 lb)   11/15/17 98.4 kg (217 lb)   07/15/16 98 kg (216 lb)              We Performed the Following     Chlamydia Trachomatis PCR [ESJ778]     Gonorrhea PCR [KAC4889]     HPV High Risk Types DNA Cervical     Obtaining, preparing and conveyance of cervical or vaginal smear to laboratory.     Pap imaged thin layer screen with HPV - recommended age 30 - 65 years (select HPV order below)          Today's Medication Changes          These changes are accurate as of: 12/18/17  2:38 PM.  If you have any questions, ask your nurse or doctor.               Stop taking these medicines if " you haven't already. Please contact your care team if you have questions.     ferrous sulfate 325 (65 FE) MG tablet   Commonly known as:  IRON   Stopped by:  Shilpa Adkins APRN CNM                    Primary Care Provider Office Phone # Fax #    Reed Isacc Garcia [phone]       [address] [place] [address]  [facility] [address]        Equal Access to Services     [facility]: Hadii aad ku hadasho Soomaali, waaxda luqadaha, qaybta kaalmada adeegyada, waxay idiin hayaan adenanentte brownjoseisreal laethan . So [facility] [phone].    ATENCIÓN: Si habla español, tiene a pearce disposición servicios gratuitos de asistencia lingüística. Mily al [phone].    We comply with applicable federal civil rights laws and [place] laws. We do not discriminate on the basis of race, color, national origin, age, disability, sex, sexual orientation, or gender identity.            Thank you!     Thank you for choosing [facility]  for your care. Our goal is always to provide you with excellent care. Hearing back from our patients is one way we can continue to improve our services. Please take a few minutes to complete the written survey that you may receive in the mail after your visit with us. Thank you!             Your Updated Medication List - Protect others around you: Learn how to safely use, store and throw away your medicines at www.disposemymeds.org.          This list is accurate as of: 12/18/17  2:38 PM.  Always use your most recent med list.                   Brand Name Dispense Instructions for use Diagnosis    enoxaparin 100 MG/ML injection    LOVENOX    60 Syringe    Inject 1 mL (100 mg) Subcutaneous every 12 hours    Deep vein thrombosis (DVT) (H), Long-term (current) use of anticoagulants       PRENATAL VITAMIN PO           VITAMIN D (CHOLECALCIFEROL) PO      Take 5,000 Units by mouth daily

## 2017-12-18 NOTE — PROGRESS NOTES
SUBJECTIVE:    37 year old, female, , 13w0d,  who presents to the clinic today for a new ob visit.    Feels well. Has  started PNV.  Estimated Date of Delivery:  is calculated from Patient's last menstrual period was 2017..      She has not had bleeding since her LMP.   She has had mild nausea. Weight loss has not occurred.   This was a planned pregnancy.   Spontaneous conception.  Used IVF with first pregnancy   FOB is involved,       OTHER CONCERNS: will be seeing MD for care due to multiple complications hx DVT,Factor V pt is on Lovenox daily     ===========================================  ROS  PSYCHIATRIC:  Denies mood changes  PHQ9: Last PHQ-9 score on record= 1  Social History   Substance Use Topics     Smoking status: Never Smoker     Smokeless tobacco: Never Used     Alcohol use No      Comment: rare use if ever     History   Drug Use No     History   Smoking Status     Never Smoker   Smokeless Tobacco     Never Used       Alcohol use No   Comment: rare use if ever     Family History   Problem Relation Age of Onset     Cardiovascular Paternal Grandmother      had aneurism     Blood Disease Sister      factor V     Blood Disease Brother      factor V  brother carry gene     Blood Disease Mother      factor V     Blood Disease Father      factor V     Circulatory Maternal Uncle      uncle with blood clots     Circulatory Paternal Uncle      uncle with blood clot     ============================================  MEDICAL HISTORY   Allergies   Allergen Reactions     Misoprostol      Tolerated multiple low doses miso induction for labor. However, one hour after delivery when she received 800 mcg miso, her skin turned blue in color and face pale and had tachycardia. No throat closure or difficulty breathing or chest pain. Quickly resolved with IV Benadryl. Patient satted just fine. Could receive again in the future; however, monitor for this side effect.        [unfilled]      Current Outpatient Prescriptions:      VITAMIN D, CHOLECALCIFEROL, PO, Take 5,000 Units by mouth daily, Disp: , Rfl:      enoxaparin (LOVENOX) 100 MG/ML injection, Inject 1 mL (100 mg) Subcutaneous every 12 hours, Disp: 60 Syringe, Rfl: 4     Prenatal Vit-Fe Fumarate-FA (PRENATAL VITAMIN PO), , Disp: , Rfl:     Past Medical History:   Diagnosis Date     Abnormal Pap smear of cervix     conization Dr. Fernandez.  f/u , paps normal      ASCUS on Pap smear 2012    + hpv,     DVT of upper extremity (deep vein thrombosis) (H)     right arm      Factor V deficiency (H)     homozygous      Mass     URETHRAl mass      PE (pulmonary embolism)     was on birth control at time, hosp x 4-5 d        Past Surgical History:   Procedure Laterality Date     APPENDECTOMY OPEN CHILD       CONIZATION  2012    Procedure: CONIZATION;  Cold Knife [facility]   Choice anesthesia;  Surgeon: Doreen Fernandez MD;  [facility]     CYSTOSCOPY, EXCISE URETHRAL CARUNCLE, COMBINED  2012    Procedure: COMBINED CYSTOSCOPY, EXCISE URETHRAL CARUNCLE;  Cystoscopy and Excision of Urethral Mass;  Surgeon: Rasheeda Salazar MD;  [facility] OR     wisdom teeth              Obstetric History       T1      L1     SAB0   TAB0   Ectopic0   Multiple0   Live Births1       # Outcome Date GA Lbr Jorge/2nd Weight Sex Delivery Anes PTL Lv   2 Current            1 Term 16 39w6d / 02:57 3.98 kg (8 lb 12.4 oz) M Vag-Spont EPI  JAZ      Apgar1:  9                Apgar5: 9      Obstetric Comments   Induced d/t anticoagulant use. + PPH. Denies GDM, HTN, PPD.       GYN History- + Abnormal Pap Smears                        Cervical procedures: Conization per Consult Dr. Rothman advised Pap with HPV cotesting at NOB exam                         History of STI: no     I personally reviewed the past social/family/medical and surgical history on the date of service.   I reviewed lab work  "done at Intake visit with patient.    OBJECTIVE:   PHYSICAL EXAM:  Ht 1.803 m (5' 10.98\")  Wt 99.3 kg (219 lb)  LMP 2017  BMI 30.56 kg/m2  BMI- Body mass index is 30.56 kg/(m^2)., GENERAL:  Pleasant pregnant female, alert, cooperative  and well groomed.  SKIN:  Warm and dry, without lesions or rashes  HEAD: Symmetrical features.    NECK:  Thyroid without enlargement and nodules.  Lymph nodes not palpable.   LUNGS:  Clear to auscultation.  BREAST:    No dominant, fixed or suspicious masses are noted.  No skin or nipple changes or axillary nodes.   Nipples everted.      HEART:  RRR without murmur.  ABDOMEN: Soft without masses , tenderness or organomegaly.  No CVA tenderness.  Uterus palpable at size equal to dates.  No scars noted.. Fetal heart tones present. 13 wk size   MUSCULOSKELETAL:  Full range of motion  EXTREMITIES:  No edema. No significant varicosities.   PELVIC EXAM:  GENITALIA: EGBUS  External genitalia, Bartholin's glands, urethra & [place]'s glands:normal. Vulva reveals no erythema or lesions.         VAGINA:  pink, normal rugae and discharge, no lesions, good tone.   CERVIX:  smooth, without discharge or CMT.                UTERUS: Retroverted,  nontender 13 week size.   ADNEXA:  Without masses or tenderness.   RECTAL:  Normal appearance.  Digital exam deferred.  WET PREP:Not done  GC/CHLAMYDIA CULTURE OBTAINED:YES    ASSESSMENT:  Intrauterine pregnancy 13w0d size  consistent with dates  Genetic Screening: Declines early screening  PLAN:  Pap obtained with HPV cotesting  GC/Chlamydia today   - Reviewed use of triage nurse line and contacting the on-call provider after hours for an urgent need such as fever, vagina bleeding, bladder or vaginal infection, rupture of membranes,  or term labor.    - Reviewed best evidence for: weight gain for her weight and height for pregnancy:  RECOMMENDED WEIGHT GAIN: 15-25 lbs.   healthy diet and foods to avoid; exercise and activity during " pregnancy;avoiding exposure to toxoplasmosis; and maintenance of a generally healthy lifestyle.   - Discussed the harms, benefits, side effects and alternative therapies for current prescribed and OTC medications.    - All pt's  questions discussed and answered.  Pt verbalized understanding of and agreement to plan of care.     - Continue scheduled prenatal care and prn if questions or concerns  Level 2 US is scheduled and next appt with MD Shilpa Adkins, CHAKA FERRARO

## 2017-12-18 NOTE — LETTER
2017       RE: Benita Olivo  [address] [facility] [address]     Dear Colleague,    Thank you for referring your patient, Benita Olivo, to the [facility] at [facility]. Please see a copy of my visit note below.      SUBJECTIVE:    37 year old, female, , 13w0d,  who presents to the clinic today for a new ob visit.    Feels well. Has  started PNV.  Estimated Date of Delivery:  is calculated from Patient's last menstrual period was 2017..      She has not had bleeding since her LMP.   She has had mild nausea. Weight loss has not occurred.   This was a planned pregnancy.   Spontaneous conception.  Used IVF with first pregnancy   FOB is involved,       OTHER CONCERNS: will be seeing MD for care due to multiple complications hx DVT,Factor V pt is on Lovenox daily     ===========================================  ROS  PSYCHIATRIC:  Denies mood changes  PHQ9: Last PHQ-9 score on record= 1  Social History   Substance Use Topics     Smoking status: Never Smoker     Smokeless tobacco: Never Used     Alcohol use No      Comment: rare use if ever     History   Drug Use No     History   Smoking Status     Never Smoker   Smokeless Tobacco     Never Used       Alcohol use No   Comment: rare use if ever     Family History   Problem Relation Age of Onset     Cardiovascular Paternal Grandmother      had aneurism     Blood Disease Sister      factor V     Blood Disease Brother      factor V  brother carry gene     Blood Disease Mother      factor V     Blood Disease Father      factor V     Circulatory Maternal Uncle      uncle with blood clots     Circulatory Paternal Uncle      uncle with blood clot     ============================================  MEDICAL HISTORY   Allergies   Allergen Reactions     Misoprostol      Tolerated multiple low doses miso induction for labor. However, one hour after delivery when  she received 800 mcg miso, her skin turned blue in color and face pale and had tachycardia. No throat closure or difficulty breathing or chest pain. Quickly resolved with IV Benadryl. Patient satted just fine. Could receive again in the future; however, monitor for this side effect.       [unfilled]      Current Outpatient Prescriptions:      VITAMIN D, CHOLECALCIFEROL, PO, Take 5,000 Units by mouth daily, Disp: , Rfl:      enoxaparin (LOVENOX) 100 MG/ML injection, Inject 1 mL (100 mg) Subcutaneous every 12 hours, Disp: 60 Syringe, Rfl: 4     Prenatal Vit-Fe Fumarate-FA (PRENATAL VITAMIN PO), , Disp: , Rfl:     Past Medical History:   Diagnosis Date     Abnormal Pap smear of cervix     conization Dr. Fernandez.  f/u , paps normal      ASCUS on Pap smear 2012    + hpv,     DVT of upper extremity (deep vein thrombosis) (H)     right arm      Factor V deficiency (H)     homozygous      Mass     URETHRAl mass      PE (pulmonary embolism)     was on birth control at time, hosp x 4-5 d        Past Surgical History:   Procedure Laterality Date     APPENDECTOMY OPEN CHILD       CONIZATION  2012    Procedure: CONIZATION;  Cold Knife [facility]one   Choice anesthesia;  Surgeon: Doreen Fernandez MD;  [facility]     CYSTOSCOPY, EXCISE URETHRAL CARUNCLE, COMBINED  2012    Procedure: COMBINED CYSTOSCOPY, EXCISE URETHRAL CARUNCLE;  Cystoscopy and Excision of Urethral Mass;  Surgeon: Rasheeda Salazar MD;  [facility] OR     wisdom teeth              Obstetric History       T1      L1     SAB0   TAB0   Ectopic0   Multiple0   Live Births1       # Outcome Date GA Lbr Jorge/2nd Weight Sex Delivery Anes PTL Lv   2 Current            1 Term 04/16 39w6d / 02:57 3.98 kg (8 lb 12.4 oz) M Vag-Spont EPI  JAZ      Apgar1:  9                Apgar5: 9      Obstetric Comments   Induced d/t anticoagulant use. + PPH. Denies GDM, HTN, PPD.       GYN History- + Abnormal Pap Smears         "                Cervical procedures: Conization per Consult Dr. Rothman advised Pap with HPV cotesting at NOB exam                         History of STI: no     I personally reviewed the past social/family/medical and surgical history on the date of service.   I reviewed lab work done at Intake visit with patient.    OBJECTIVE:   PHYSICAL EXAM:  Ht 1.803 m (5' 10.98\")  Wt 99.3 kg (219 lb)  LMP 09/18/2017  BMI 30.56 kg/m2  BMI- Body mass index is 30.56 kg/(m^2)., GENERAL:  Pleasant pregnant female, alert, cooperative  and well groomed.  SKIN:  Warm and dry, without lesions or rashes  HEAD: Symmetrical features.    NECK:  Thyroid without enlargement and nodules.  Lymph nodes not palpable.   LUNGS:  Clear to auscultation.  BREAST:    No dominant, fixed or suspicious masses are noted.  No skin or nipple changes or axillary nodes.   Nipples everted.      HEART:  RRR without murmur.  ABDOMEN: Soft without masses , tenderness or organomegaly.  No CVA tenderness.  Uterus palpable at size equal to dates.  No scars noted.. Fetal heart tones present. 13 wk size   MUSCULOSKELETAL:  Full range of motion  EXTREMITIES:  No edema. No significant varicosities.   PELVIC EXAM:  GENITALIA: EGBUS  External genitalia, Bartholin's glands, urethra & [place] glands:normal. Vulva reveals no erythema or lesions.         VAGINA:  pink, normal rugae and discharge, no lesions, good tone.   CERVIX:  smooth, without discharge or CMT.                UTERUS: Retroverted,  nontender 13 week size.   ADNEXA:  Without masses or tenderness.   RECTAL:  Normal appearance.  Digital exam deferred.  WET PREP:Not done  GC/CHLAMYDIA CULTURE OBTAINED:YES    ASSESSMENT:  Intrauterine pregnancy 13w0d size  consistent with dates  Genetic Screening: Declines early screening  PLAN:  Pap obtained with HPV cotesting  GC/Chlamydia today   - Reviewed use of triage nurse line and contacting the on-call provider after hours for an urgent need such as fever, vagina " bleeding, bladder or vaginal infection, rupture of membranes,  or term labor.    - Reviewed best evidence for: weight gain for her weight and height for pregnancy:  RECOMMENDED WEIGHT GAIN: 15-25 lbs.   healthy diet and foods to avoid; exercise and activity during pregnancy;avoiding exposure to toxoplasmosis; and maintenance of a generally healthy lifestyle.   - Discussed the harms, benefits, side effects and alternative therapies for current prescribed and OTC medications.    - All pt's  questions discussed and answered.  Pt verbalized understanding of and agreement to plan of care.     - Continue scheduled prenatal care and prn if questions or concerns  Level 2 US is scheduled and next appt with MD Shilpa Adkins, CHAKA FERRARO

## 2017-12-19 LAB
C TRACH DNA SPEC QL NAA+PROBE: NEGATIVE
N GONORRHOEA DNA SPEC QL NAA+PROBE: NEGATIVE
SPECIMEN SOURCE: NORMAL
SPECIMEN SOURCE: NORMAL

## 2017-12-20 LAB
COPATH REPORT: NORMAL
PAP: NORMAL

## 2017-12-22 LAB
FINAL DIAGNOSIS: NORMAL
HPV HR 12 DNA CVX QL NAA+PROBE: NEGATIVE
HPV16 DNA SPEC QL NAA+PROBE: NEGATIVE
HPV18 DNA SPEC QL NAA+PROBE: NEGATIVE
SPECIMEN DESCRIPTION: NORMAL

## 2018-01-15 ENCOUNTER — OFFICE VISIT (OUTPATIENT)
Dept: OBGYN | Facility: CLINIC | Age: 38
End: 2018-01-15
Attending: OBSTETRICS & GYNECOLOGY
Payer: COMMERCIAL

## 2018-01-15 ENCOUNTER — TELEPHONE (OUTPATIENT)
Dept: OBGYN | Facility: CLINIC | Age: 38
End: 2018-01-15

## 2018-01-15 VITALS
BODY MASS INDEX: 31.36 KG/M2 | HEIGHT: 71 IN | DIASTOLIC BLOOD PRESSURE: 69 MMHG | SYSTOLIC BLOOD PRESSURE: 102 MMHG | HEART RATE: 91 BPM | WEIGHT: 224 LBS

## 2018-01-15 DIAGNOSIS — R12 HEARTBURN: ICD-10-CM

## 2018-01-15 DIAGNOSIS — Z98.890 HISTORY OF CONIZATION OF CERVIX: Primary | ICD-10-CM

## 2018-01-15 DIAGNOSIS — D06.9 ADENOCARCINOMA IN SITU (AIS) OF UTERINE CERVIX: ICD-10-CM

## 2018-01-15 DIAGNOSIS — O09.529 SUPERVISION OF HIGH-RISK PREGNANCY OF ELDERLY MULTIGRAVIDA: Primary | ICD-10-CM

## 2018-01-15 PROCEDURE — G0463 HOSPITAL OUTPT CLINIC VISIT: HCPCS | Mod: ZF

## 2018-01-15 ASSESSMENT — PAIN SCALES - GENERAL: PAINLEVEL: NO PAIN (0)

## 2018-01-15 NOTE — TELEPHONE ENCOUNTER
----- Message from Junie Hightower MD sent at 1/15/2018  9:02 AM CST -----  Hello! Benita needs cervical length ultrasounds- I was hoping these could start with her anatomy US, which is scheduled for 1/22. Are you able to get in touch with MFM to ask if they can add a transvaginal cervical length US to her appointment? Otherwise, I can put in orders for her to be scheduled here in [facility] clinic.    Thanks!  Junie

## 2018-01-15 NOTE — TELEPHONE ENCOUNTER
Added on cervical length assessment with MFM comp next week. Left message for patient with this information and told patient in message that she can make future weekly cervical length ultrasounds with [facility] or [facility]. Orders entered for both [facility] and [facility].

## 2018-01-15 NOTE — PROGRESS NOTES
"[facility] Return OB     S: Feeling well. Having some occasional heartburn; took Zantac in last pregnancy as symptoms got worse throughout, so thinking to do the same now. Denies cramping, bleeding or LOF. Taking Lovenox 100 mg BID; spoke with Dr. Garcia's office early in pregnancy.     O:  Vitals:    01/15/18 0815   BP: 102/69   Pulse: 91   Weight: 101.6 kg (224 lb)   Height: 1.803 m (5' 11\")     Gen: alert, oriented, NAD  See prenatal flowsheet    A/P:  37 year old  at 17w0d presenting for return OB visit, doing well today.     Pregnancy complicated by:  - FVL homozygote with history of PE (, on OCPs) and DVT (); on lifelong anticoagulation  - History of cervical adenocarcinoma s/p cone excision with negative margins   - Rh negative status    Routine prenatal  - NOB labs wnl  - Rhogam at 28 weeks  - Declined first tri screening  - Level II US scheduled   - Influenza vaccine declined; discussed with patient rationale for vaccination in pregnancy    FVL, history of PE and DVT  - Continue Lovenox 100 mg BID   - Dr. Garcia is hematologist; recommend clinic visit in the next 1-2 months.     Adenocarcinoma of cervix s/p cone excision  - Recommended cervical length US through 24 weeks given slightly increased risks with history of CKC; patient had this screening in last pregnancy. Will plan to start with anatomy US this week, continue every other week through 24 weeks.   - Negative pap and HPV most recently 17  - Discuss hysterectomy for definitive management after delivery/when childbearing complete     RTC in 4 weeks    Junie Hightower MD        "

## 2018-01-15 NOTE — LETTER
"1/15/2018       RE: Benita Olivo  [address] [facility] [address]     Dear Colleague,    Thank you for referring your patient, Bneita Olivo, to the [facility] at [facility]. Please see a copy of my visit note below.    [facility] Return [facility]     S: Feeling well. Having some occasional heartburn; took Zantac in last pregnancy as symptoms got worse throughout, so thinking to do the same now. Denies cramping, bleeding or LOF. Taking Lovenox 100 mg BID; spoke with Dr. Garcia's office early in pregnancy.     O:  Vitals:    01/15/18 0815   BP: 102/69   Pulse: 91   Weight: 101.6 kg (224 lb)   Height: 1.803 m (5' 11\")     Gen: alert, oriented, NAD  See prenatal flowsheet    A/P:  37 year old  at 17w0d presenting for return OB visit, doing well today.     Pregnancy complicated by:  - FVL homozygote with history of PE (, on OCPs) and DVT (); on lifelong anticoagulation  - History of cervical adenocarcinoma s/p cone excision with negative margins   - Rh negative status    Routine prenatal  - NOB labs wnl  - Rhogam at 28 weeks  - Declined first tri screening  - Level II US scheduled   - Influenza vaccine declined; discussed with patient rationale for vaccination in pregnancy    FVL, history of PE and DVT  - Continue Lovenox 100 mg BID   - Dr. Garcia is hematologist; recommend clinic visit in the next 1-2 months.     Adenocarcinoma of cervix s/p cone excision  - Recommended cervical length US through 24 weeks given slightly increased risks with history of CKC; patient had this screening in last pregnancy. Will plan to start with anatomy US this week, continue every other week through 24 weeks.   - Negative pap and HPV most recently 17  - Discuss hysterectomy for definitive management after delivery/when childbearing complete     RTC in 4 weeks      Again, thank you for allowing me to participate in the care of your patient.  "     Sincerely,    Junie Hightower MD

## 2018-01-15 NOTE — MR AVS SNAPSHOT
After Visit Summary   1/15/2018    Benita Olivo    MRN: [id number]           Patient Information     Date Of Birth          1980        Visit Information        Provider Department      1/15/2018 8:15 AM Junie Hightower MD [facility]        Today's Diagnoses     Supervision of high-risk pregnancy of elderly multigravida    -  1    Adenocarcinoma in situ (AIS) of uterine cervix        Heartburn           Follow-ups after your visit        Follow-up notes from your care team     Return in about 4 weeks (around 2/12/2018) for Return OB.      Your next 10 appointments already scheduled     Jan 22, 2018  8:45 AM SHELLY GALAVIZ US COMP with [id number]   [facility])    [address]  [facility] [address] [phone]           Wear comfortable clothes and leave your valuables at home.            Jan 22, 2018  9:15 AM SHELLY   Radiology MD with UR ANASTACIA GUIDRY   [facility])    [address]  [facility] [address] [phone]           Please arrive at the time given for your first appointment. This visit is used internally to schedule the physician's time during your ultrasound.              Who to contact     Please call your clinic at [phone] to:    Ask questions about your health    Make or cancel appointments    Discuss your medicines    Learn about your test results    Speak to your doctor   If you have compliments or concerns about an experience at your clinic, or if you wish to file a complaint, please contact [facility] Patient Relations at [phone] or email us at [email or web address]         Additional Information About Your Visit        MyChart Information     [facility] gives you secure access to your electronic health record. If you see a  "primary care provider, you can also send messages to your care team and make appointments. If you have questions, please call your primary care clinic.  If you do not have a primary care provider, please call [phone] and they will assist you.      [employer] is an electronic gateway that provides easy, online access to your medical records. With [employer], you can request a clinic appointment, read your test results, renew a prescription or communicate with your care team.     To access your existing account, please contact your [facility] or call [phone] for assistance.        Care EveryWhere ID     This is your Care EveryWhere ID. This could be used by other organizations to access your [facility] medical records  [id number]        Your Vitals Were     Pulse Height Last Period BMI (Body Mass Index)          91 1.803 m (5' 11\") 09/18/2017 31.24 kg/m2         Blood Pressure from Last 3 Encounters:   01/15/18 102/69   12/18/17 103/65   11/15/17 100/71    Weight from Last 3 Encounters:   01/15/18 101.6 kg (224 lb)   12/18/17 99.3 kg (219 lb)   11/15/17 98.4 kg (217 lb)              Today, you had the following     No orders found for display         Today's Medication Changes          These changes are accurate as of: 1/15/18  9:11 AM.  If you have any questions, ask your nurse or doctor.               Start taking these medicines.        Dose/Directions    ranitidine 75 MG tablet   Commonly known as:  ZANTAC   Used for:  Heartburn   Started by:  Junie Hightower MD        Dose:  150 mg   Take 2 tablets (150 mg) by mouth 2 times daily   Quantity:  30 tablet   Refills:  3            Where to get your medicines      These medications were sent to [place] [address] [place] [facility] - [address]  [address] [place] [address]     Phone:  [phone]     ranitidine 75 MG tablet                Primary Care Provider Office Phone # Fax #    Reed Garcia MD " [phone]       [address] [place] [address]  [facility] [address]        Equal Access to Services     MISAEL WISDOM : Hadii aad ku hadumabeckie Mcconnell, anettejackie poonhetalha, jannlance noelshayne twanolga, genevieve genin hayaan twannannette watkins jackie patel. So [facility] [phone].    ATENCIÓN: Si habla español, tiene a pearce disposición servicios gratuitos de asistencia lingüística. Llame al [phone].    We comply with applicable federal civil rights laws and [place] laws. We do not discriminate on the basis of race, color, national origin, age, disability, sex, sexual orientation, or gender identity.            Thank you!     Thank you for choosing [facility]  for your care. Our goal is always to provide you with excellent care. Hearing back from our patients is one way we can continue to improve our services. Please take a few minutes to complete the written survey that you may receive in the mail after your visit with us. Thank you!             Your Updated Medication List - Protect others around you: Learn how to safely use, store and throw away your medicines at www.disposemymeds.org.          This list is accurate as of: 1/15/18  9:11 AM.  Always use your most recent med list.                   Brand Name Dispense Instructions for use Diagnosis    enoxaparin 100 MG/ML injection    LOVENOX    60 Syringe    Inject 1 mL (100 mg) Subcutaneous every 12 hours    Deep vein thrombosis (DVT) (H), Long-term (current) use of anticoagulants       PRENATAL VITAMIN PO           ranitidine 75 MG tablet    ZANTAC    30 tablet    Take 2 tablets (150 mg) by mouth 2 times daily    Heartburn       VITAMIN D (CHOLECALCIFEROL) PO      Take 5,000 Units by mouth daily

## 2018-01-19 ENCOUNTER — PRE VISIT (OUTPATIENT)
Dept: MATERNAL FETAL MEDICINE | Facility: CLINIC | Age: 38
End: 2018-01-19

## 2018-01-22 ENCOUNTER — OFFICE VISIT (OUTPATIENT)
Dept: MATERNAL FETAL MEDICINE | Facility: CLINIC | Age: 38
End: 2018-01-22
Attending: ADVANCED PRACTICE MIDWIFE
Payer: COMMERCIAL

## 2018-01-22 ENCOUNTER — HOSPITAL ENCOUNTER (OUTPATIENT)
Dept: ULTRASOUND IMAGING | Facility: CLINIC | Age: 38
Discharge: HOME | End: 2018-01-22
Attending: ADVANCED PRACTICE MIDWIFE | Admitting: OBSTETRICS & GYNECOLOGY
Payer: COMMERCIAL

## 2018-01-22 DIAGNOSIS — O44.40 LOW-LYING PLACENTA: ICD-10-CM

## 2018-01-22 DIAGNOSIS — O26.90 PREGNANCY RELATED CONDITION, ANTEPARTUM: ICD-10-CM

## 2018-01-22 DIAGNOSIS — O34.42 HISTORY OF CONE BIOPSY OF CERVIX COMPLICATING PREGNANCY, SECOND TRIMESTER: Primary | ICD-10-CM

## 2018-01-22 DIAGNOSIS — O43.122 INSERTION, VELAMENTOUS, UMBILICAL CORD, SECOND TRIMESTER: ICD-10-CM

## 2018-01-22 DIAGNOSIS — Z98.890 HISTORY OF CONE BIOPSY OF CERVIX COMPLICATING PREGNANCY, SECOND TRIMESTER: Primary | ICD-10-CM

## 2018-01-22 PROCEDURE — 76817 TRANSVAGINAL US OBSTETRIC: CPT | Performed by: ADVANCED PRACTICE MIDWIFE

## 2018-01-22 PROCEDURE — 76811 OB US DETAILED SNGL FETUS: CPT

## 2018-01-22 NOTE — PROGRESS NOTES
"Please see \"Imaging\" tab under \"Chart Review\" for details of today's visit.    Antonette Lovell    "

## 2018-01-22 NOTE — MR AVS SNAPSHOT
After Visit Summary   1/22/2018    Benita Olivo    MRN: [id number]           Patient Information     Date Of Birth          1980        Visit Information        Provider Department      1/22/2018 9:15 AM Antonette Lovell MD [facility] Maternal Fetal Medicine - [place]        Today's Diagnoses     History of cone biopsy of cervix complicating pregnancy, second trimester    -  1    Low-lying placenta        Insertion, velamentous, umbilical cord, second trimester           Follow-ups after your visit        Your next 10 appointments already scheduled     Feb 05, 2018  8:00 AM CST   (Arrive by 7:45 AM)   MFM US OB TRANSVAGINAL with [id number]   [facility] Maternal Fetal Medicine Ultrasound - [facility])    [address]  [facility] [id number] [phone]           Wear comfortable clothes and leave your valuables at home.            Feb 05, 2018  8:30 AM CST   Radiology MD with UR ANASTACIA GUIDRY   [facility] Maternal Fetal Medicine - [place] ([facility])    [address]  [facility] [address]   [phone]           Please arrive at the time given for your first appointment. This visit is used internally to schedule the physician's time during your ultrasound.            Feb 21, 2018  8:00 AM CST   (Arrive by 7:45 AM)   MFM US COMPRE SINGLE F/U with [id number]   [facility] Maternal Fetal Medicine Ultrasound - [place] ([facility])    [address]  [facility] [id number] [phone]           Wear comfortable clothes and leave your valuables at home.            Feb 21, 2018  8:30 AM CST   Radiology MD with UR ANASTACIA GUIDRY   [facility] Maternal Fetal Medicine - [place] ([facility])    [address]  [facility] [address]   [phone]           Please arrive at the time given for your first appointment. This visit is used  internally to schedule the physician's time during your ultrasound.            Mar 06, 2018  8:30 AM CST   RETURN OB with Doreen Fernandez MD   [facility] ([facility])    [place] [facility] [address]  [address]  [facility] [address] [phone]              Future tests that were ordered for you today     Open Future Orders        Priority Expected Expires Ordered    MFM US OB Transvaginal Routine  11/22/2018 1/22/2018    MFM US Comprehensive Single F/U Routine  1/22/2019 1/22/2018            Who to contact     If you have questions or need follow up information about today's clinic visit or your schedule please contact [email or web address] - [place] directly at [phone].  Normal or non-critical lab and imaging results will be communicated to you by MyChart, letter or phone within 4 business days after the clinic has received the results. If you do not hear from us within 7 days, please contact the clinic through Tackkhart or phone. If you have a critical or abnormal lab result, we will notify you by phone as soon as possible.  Submit refill requests through Liquid Engines or call your pharmacy and they will forward the refill request to us. Please allow 3 business days for your refill to be completed.          Additional Information About Your Visit        Liquid Engines Information     [employer] gives you secure access to your electronic health record. If you see a primary care provider, you can also send messages to your care team and make appointments. If you have questions, please call your primary care clinic.  If you do not have a primary care provider, please call [phone] and they will assist you.        Care EveryWhere ID     This is your Care EveryWhere ID. This could be used by other organizations to access your [place] medical records  [id number]        Your Vitals Were     Last Period                   09/18/2017             Blood Pressure from Last 3 Encounters:   01/15/18 102/69   12/18/17 103/65   11/15/17 100/71    Weight from Last 3 Encounters:   01/15/18 101.6 kg (224 lb)   12/18/17 99.3 kg (219 lb)   11/15/17 98.4 kg (217 lb)               Primary Care Provider Office Phone # Fax #    Reed Isacc Garcia [phone]       [address] [place] [address]        Equal Access to Services     MISAEL WISDOM : Hadii aad ku hadasho Soomaali, waaxda luqadaha, qaybta kaalmada adeegyada, waxay genin hayaan dina mejia . So [facility] [phone].    ATENCIÓN: Si habla español, tiene a pearce disposición servicios gratuitos de asistencia lingüística. [facility] [phone].    We comply with applicable federal civil rights laws and [place] laws. We do not discriminate on the basis of race, color, national origin, age, disability, sex, sexual orientation, or gender identity.            Thank you!     Thank you for choosing [facility]  for your care. Our goal is always to provide you with excellent care. Hearing back from our patients is one way we can continue to improve our services. Please take a few minutes to complete the written survey that you may receive in the mail after your visit with us. Thank you!             Your Updated Medication List - Protect others around you: Learn how to safely use, store and throw away your medicines at www.disposemymeds.org.          This list is accurate as of: 1/22/18  3:59 PM.  Always use your most recent med list.                   Brand Name Dispense Instructions for use Diagnosis    enoxaparin 100 MG/ML injection    LOVENOX    60 Syringe    Inject 1 mL (100 mg) Subcutaneous every 12 hours    Deep vein thrombosis (DVT) (H), Long-term (current) use of anticoagulants       PRENATAL VITAMIN PO           ranitidine 75 MG tablet    ZANTAC    30 tablet    Take 2 tablets (150 mg) by mouth 2 times daily    Heartburn       VITAMIN D  (CHOLECALCIFEROL) PO      Take 5,000 Units by mouth daily

## 2018-01-23 PROBLEM — O44.42 LOW-LYING PLACENTA IN SECOND TRIMESTER: Status: ACTIVE | Noted: 2018-01-23

## 2018-01-23 PROBLEM — O09.529 SUPERVISION OF HIGH-RISK PREGNANCY OF ELDERLY MULTIGRAVIDA: Status: ACTIVE | Noted: 2017-11-15

## 2018-01-23 PROBLEM — O43.129 VELAMENTOUS INSERTION OF UMBILICAL CORD: Status: ACTIVE | Noted: 2018-01-23

## 2018-02-05 ENCOUNTER — HOSPITAL ENCOUNTER (OUTPATIENT)
Dept: ULTRASOUND IMAGING | Facility: CLINIC | Age: 38
Discharge: HOME | End: 2018-02-05
Attending: OBSTETRICS & GYNECOLOGY | Admitting: OBSTETRICS & GYNECOLOGY
Payer: COMMERCIAL

## 2018-02-05 ENCOUNTER — OFFICE VISIT (OUTPATIENT)
Dept: MATERNAL FETAL MEDICINE | Facility: CLINIC | Age: 38
End: 2018-02-05
Attending: OBSTETRICS & GYNECOLOGY
Payer: COMMERCIAL

## 2018-02-05 DIAGNOSIS — Z98.890 HISTORY OF CONE BIOPSY OF CERVIX COMPLICATING PREGNANCY, SECOND TRIMESTER: Primary | ICD-10-CM

## 2018-02-05 DIAGNOSIS — O34.42 HISTORY OF CONE BIOPSY OF CERVIX COMPLICATING PREGNANCY, SECOND TRIMESTER: Primary | ICD-10-CM

## 2018-02-05 PROCEDURE — 76817 TRANSVAGINAL US OBSTETRIC: CPT

## 2018-02-05 NOTE — MR AVS SNAPSHOT
After Visit Summary   2/5/2018    Benita Olivo    MRN: [id number]           Patient Information     Date Of Birth          1980        Visit Information        Provider Department      2/5/2018 8:30 AM Morena Bentley DO [facility] Maternal Fetal Medicine [facility]        Today's Diagnoses     History of cone biopsy of cervix complicating pregnancy, second trimester    -  1       Follow-ups after your visit        Your next 10 appointments already scheduled     Feb 21, 2018  8:00 AM CST   (Arrive by 7:45 AM)   MFM US COMPRE SINGLE F/U with [id number]   [facility] Maternal Fetal Medicine Ultrasound - [place] ([facility])    [address]  [facility] [address] [phone]           Wear comfortable clothes and leave your valuables at home.            Feb 21, 2018  8:30 AM CST   Radiology MD with UR ANASTACIA GUIDRY   [facility] Maternal Fetal Medicine - [facility])    [address]  [facility] [address] [phone]           Please arrive at the time given for your first appointment. This visit is used internally to schedule the physician's time during your ultrasound.            Mar 06, 2018  8:30 AM CST   RETURN OB with Doreen Fernandez MD   [facility] ([facility])    [place] [facility] [address]  [address]  [facility] [id number] [phone]              Who to contact     If you have questions or need follow up information about today's clinic visit or your schedule please contact [facility] MATERNAL FETAL MEDICINE [facility] directly at [phone].  Normal or non-critical lab and imaging results will be communicated to you by MyChart, letter or phone within 4 business days after the clinic has received the results. If you do not hear from us within 7 days, please contact the clinic through MyChart or phone. If you have a  critical or abnormal lab result, we will notify you by phone as soon as possible.  Submit refill requests through DataLocker or call your pharmacy and they will forward the refill request to us. Please allow 3 business days for your refill to be completed.          Additional Information About Your Visit        Caspidahart Information     [employer] gives you secure access to your electronic health record. If you see a primary care provider, you can also send messages to your care team and make appointments. If you have questions, please call your primary care clinic.  If you do not have a primary care provider, please call [phone] and they will assist you.        Care EveryWhere ID     This is your Care EveryWhere ID. This could be used by other organizations to access your [place] medical records  [id number]        Your Vitals Were     Last Period                   09/18/2017            Blood Pressure from Last 3 Encounters:   01/15/18 102/69   12/18/17 103/65   11/15/17 100/71    Weight from Last 3 Encounters:   01/15/18 101.6 kg (224 lb)   12/18/17 99.3 kg (219 lb)   11/15/17 98.4 kg (217 lb)              Today, you had the following     No orders found for display       Primary Care Provider Office Phone # Fax #    Reed Garcia [phone]       [address] [place] [address]        Equal Access to Services     MISAEL WISDOM : Hadii alda mariscal hadasho Soomaali, waaxda luqadaha, qaybta kaalmada adeegyada, genevieve patel. So [facility] [place] [facility] [phone].    ATENCIÓN: Si habla español, tiene a pearce disposición servicios gratuitos de asistencia lingüística. Llame al [phone].    We comply with applicable federal civil rights laws and [place] laws. We do not discriminate on the basis of race, color, national origin, age, disability, sex, sexual orientation, or gender identity.            Thank you!     Thank you for choosing [facility]  [place]  for your care. Our goal is always to provide you with excellent care. Hearing back from our patients is one way we can continue to improve our services. Please take a few minutes to complete the written survey that you may receive in the mail after your visit with us. Thank you!             Your Updated Medication List - Protect others around you: Learn how to safely use, store and throw away your medicines at www.disposemymeds.org.          This list is accurate as of 2/5/18  9:02 AM.  Always use your most recent med list.                   Brand Name Dispense Instructions for use Diagnosis    enoxaparin 100 MG/ML injection    LOVENOX    60 Syringe    Inject 1 mL (100 mg) Subcutaneous every 12 hours    Deep vein thrombosis (DVT) (H), Long-term (current) use of anticoagulants       PRENATAL VITAMIN PO           ranitidine 75 MG tablet    ZANTAC    30 tablet    Take 2 tablets (150 mg) by mouth 2 times daily    Heartburn       VITAMIN D (CHOLECALCIFEROL) PO      Take 5,000 Units by mouth daily

## 2018-02-05 NOTE — PROGRESS NOTES
"Please see \"Imaging\" tab under \"Chart Review\" for details of today's US.    Morena Bentley, DO    "

## 2018-02-21 ENCOUNTER — OFFICE VISIT (OUTPATIENT)
Dept: MATERNAL FETAL MEDICINE | Facility: CLINIC | Age: 38
End: 2018-02-21
Attending: OBSTETRICS & GYNECOLOGY
Payer: COMMERCIAL

## 2018-02-21 ENCOUNTER — HOSPITAL ENCOUNTER (OUTPATIENT)
Dept: ULTRASOUND IMAGING | Facility: CLINIC | Age: 38
Discharge: HOME | End: 2018-02-21
Attending: OBSTETRICS & GYNECOLOGY | Admitting: OBSTETRICS & GYNECOLOGY
Payer: COMMERCIAL

## 2018-02-21 DIAGNOSIS — O44.40 LOW-LYING PLACENTA: ICD-10-CM

## 2018-02-21 DIAGNOSIS — O43.122 INSERTION, VELAMENTOUS, UMBILICAL CORD, SECOND TRIMESTER: ICD-10-CM

## 2018-02-21 DIAGNOSIS — O43.123 VELAMENTOUS INSERTION OF UMBILICAL CORD IN THIRD TRIMESTER: Primary | ICD-10-CM

## 2018-02-21 PROCEDURE — 76816 OB US FOLLOW-UP PER FETUS: CPT

## 2018-02-21 PROCEDURE — 76817 TRANSVAGINAL US OBSTETRIC: CPT | Performed by: OBSTETRICS & GYNECOLOGY

## 2018-02-21 NOTE — PROGRESS NOTES
Please see full imaging report from ViewPoint program under imaging tab.    Overall reassuring findings reviewed with Benita today. I recommend serial growth US every 4-6 weeks based on history and possible velamentous CI. I assume that these US will be done at [facility], although we are happy to see her back if you prefer. No further TVUS given normal findings today.     Airam Randolph MD  Maternal Fetal Medicine

## 2018-02-21 NOTE — MR AVS SNAPSHOT
After Visit Summary   2/21/2018    Benita Olivo    MRN: [id number]           Patient Information     Date Of Birth          1980        Visit Information        Provider Department      2/21/2018 8:30 AM Airam Randolph MD [facility] Maternal Fetal Medicine [facility]        Today's Diagnoses     Velamentous insertion of umbilical cord in third trimester    -  1       Follow-ups after your visit        Your next 10 appointments already scheduled     Mar 06, 2018  8:30 AM CST   RETURN OB with Doreen Fernandez MD   [facility] ([facility])    [place] [facility] [address]  [address]  [address]  [facility] [address] [phone]              Who to contact     If you have questions or need follow up information about today's clinic visit or your schedule please contact [facility]AL MEDICINE [facility] directly at [phone].  Normal or non-critical lab and imaging results will be communicated to you by MyChart, letter or phone within 4 business days after the clinic has received the results. If you do not hear from us within 7 days, please contact the clinic through [employer] ([facility]hart or phone. If you have a critical or abnormal lab result, we will notify you by phone as soon as possible.  Submit refill requests through Mercury Puzzle or call your pharmacy and they will forward the refill request to us. Please allow 3 business days for your refill to be completed.          Additional Information About Your Visit        MyChart Information     Merc[employer]zzle gives you secure access to your electronic health record. If you see a primary care provider, you can also send messages to your care team and make appointments. If you have questions, please call your primary care clinic.  If you do not have a primary care provider, please call [phone] and they will assist you.        Care EveryWhere ID     This is your Care EveryWhere ID. This could be used  by other organizations to access your [place] medical records  [id number]        Your Vitals Were     Last Period                   09/18/2017            Blood Pressure from Last 3 Encounters:   01/15/18 102/69   12/18/17 103/65   11/15/17 100/71    Weight from Last 3 Encounters:   01/15/18 101.6 kg (224 lb)   12/18/17 99.3 kg (219 lb)   11/15/17 98.4 kg (217 lb)              Today, you had the following     No orders found for display       Primary Care Provider Office Phone # Fax #    Reed Isacc Garcia [phone]       [address] [facility] [address]  [facility] [address]        Equal Access to Services     MISAEL WISDOM : Kam Mcconnell, waharvey fischer, lamar kaalmada alexandar, genevieve patel. So [facility] [phone].    ATENCIÓN: Si habla español, tiene a pearce disposición servicios gratuitos de asistencia lingüística. Llame al [phone].    We comply with applicable federal civil rights laws and [place] laws. We do not discriminate on the basis of race, color, national origin, age, disability, sex, sexual orientation, or gender identity.            Thank you!     Thank you for choosing [facility]  for your care. Our goal is always to provide you with excellent care. Hearing back from our patients is one way we can continue to improve our services. Please take a few minutes to complete the written survey that you may receive in the mail after your visit with us. Thank you!             Your Updated Medication List - Protect others around you: Learn how to safely use, store and throw away your medicines at www.disposemymeds.org.          This list is accurate as of 2/21/18  9:28 AM.  Always use your most recent med list.                   Brand Name Dispense Instructions for use Diagnosis    enoxaparin 100 MG/ML injection    LOVENOX    60 Syringe    Inject 1 mL (100 mg) Subcutaneous every 12 hours    Deep vein thrombosis  (DVT) (H), Long-term (current) use of anticoagulants       PRENATAL VITAMIN PO           ranitidine 75 MG tablet    ZANTAC    30 tablet    Take 2 tablets (150 mg) by mouth 2 times daily    Heartburn       VITAMIN D (CHOLECALCIFEROL) PO      Take 5,000 Units by mouth daily

## 2018-03-06 ENCOUNTER — OFFICE VISIT (OUTPATIENT)
Dept: OBGYN | Facility: CLINIC | Age: 38
End: 2018-03-06
Attending: OBSTETRICS & GYNECOLOGY
Payer: COMMERCIAL

## 2018-03-06 VITALS
BODY MASS INDEX: 32.55 KG/M2 | WEIGHT: 232.5 LBS | DIASTOLIC BLOOD PRESSURE: 74 MMHG | HEIGHT: 71 IN | SYSTOLIC BLOOD PRESSURE: 111 MMHG | HEART RATE: 96 BPM

## 2018-03-06 DIAGNOSIS — O09.529 SUPERVISION OF HIGH-RISK PREGNANCY OF ELDERLY MULTIGRAVIDA: Primary | ICD-10-CM

## 2018-03-06 DIAGNOSIS — O43.122 VELAMENTOUS INSERTION OF UMBILICAL CORD IN SECOND TRIMESTER: ICD-10-CM

## 2018-03-06 NOTE — LETTER
3/6/2018       RE: Benita Olivo  [address] [facility] [address]     Dear Colleague,    Thank you for referring your patient, Benita Olivo, to the [facility] at [facility]. Please see a copy of my visit note below.    OB Visit Note    37 year old  at 24w1d by dates and US here today for follow-up OB visit.  Current pregnancy c/b velamentous cord insertion, currently being followed by serial ultrasounds per MFM. Most recent US reassuring. She has additional h/o Factor V Leiden c/b DVT and PE (on therapeutic Lovenox) as well as cervical adenocarcinoma in situ with unremarkable Pap and HPV testing on 17.     Patient reports No Problems.  Has been feeling a bit more fatigued lately, which she attributes to stress at work as well as caring for her toddler son.  Does feel fetal movements.  Does have some AHUJA, but no dyspnea at rest, chest pain.  Denies frequent n/v/d.  Occasional episodes of lower abdominal tenderness, but tolerable per patient.  Denies vaginal bleeding, discharge.     Review Of Systems  Constitutional, HEENT, cardiovascular, pulmonary, gi and gu systems are negative, except as otherwise noted.    Exam:   GENERAL APPEARANCE: healthy, alert and no distress  EYES: EOMI,  PERRL  HENT: External ear canal normal and nose and mouth without ulcers or lesions  RESP: lungs clear to auscultation - no rales, rhonchi or wheezes  CV: regular rates and rhythm, normal S1 S2, no S3 or S4 and no murmur, click or rub -  ABDOMEN:  soft, nontender, palpable gravid uterus  Fetal heart tones auscultated with Doppler      Assessment: 37 year old  at 24w1d by dates c/w US findings here today for routine prenatal visit. She has h/o AIS and Factor V Leiden on Lovenox. Followed by MFM for velamentous cord insertion with most recent US reassuring.  Reports doing well overall.     Plan:  1. RTC 4 weeks for EOB  2. Growth US 4-6 weeks  3.  Factor Xa levels per primary Qtrimester  4. Rh negative- Rhogam next visit      Written by Magdalena Barajas, MS3, acting as scribe for Dr. Fernandez.  I, Dr. Fernandez, personally performed the services described in this documentation, as scribed by Magdalena Barajas in my presence, and it is both accurate and complete.      Again, thank you for allowing me to participate in the care of your patient.      Sincerely,    Doreen Fernandez MD

## 2018-03-06 NOTE — MR AVS SNAPSHOT
After Visit Summary   3/6/2018    Benita Olivo    MRN: [id number]           Patient Information     Date Of Birth          1980        Visit Information        Provider Department      3/6/2018 8:30 AM Doreen Fernandez MD [facility]        Today's Diagnoses     Supervision of high-risk pregnancy of elderly multigravida- Discuss at high risk rounds    -  1    Velamentous insertion of umbilical cord in second trimester           Follow-ups after your visit        Follow-up notes from your care team     Return in about 4 weeks (around 4/3/2018) for EOB.      Your next 10 appointments already scheduled     Apr 03, 2018  9:00 AM CDT   ULTRASOUND with [facility] ULTRASOUND   [facility] ([facility])    [place] [facility] [address]  [address]  [facility] [address] [phone]            Apr 03, 2018  9:30 AM CDT   RETURN OB with Doreen Fernandez MD   [facility] ([facility])    [place] [facility] [address]  [address]  [facility] [address] [phone]              Future tests that were ordered for you today     Open Future Orders        Priority Expected Expires Ordered    Growth Ultrasound [address] Routine  7/4/2018 3/6/2018            Who to contact     Please call your clinic at [phone] to:    Ask questions about your health    Make or cancel appointments    Discuss your medicines    Learn about your test results    Speak to your doctor            Additional Information About Your Visit        MyChart Information     The [employer] gives you secure access to your electronic health record. If you see a primary care provider, you can also send messages to your care team and make appointments. If you have questions, please call your primary care clinic.  If you do not have a primary care provider, please call [phone] and they will assist you.       "EventBuilder is an electronic gateway that provides easy, online access to your medical records. With [employer], you can request a clinic appointment, read your test results, renew a prescription or communicate with your care team.     To access your existing account, please contact your [facility] or call [phone] for assistance.        Care EveryWhere ID     This is your Care EveryWhere ID. This could be used by other organizations to access your [place] medical records  [id number]        Your Vitals Were     Pulse Height Last Period BMI (Body Mass Index)          96 1.803 m (5' 11\") 09/18/2017 32.43 kg/m2         Blood Pressure from Last 3 Encounters:   03/06/18 111/74   01/15/18 102/69   12/18/17 103/65    Weight from Last 3 Encounters:   03/06/18 105.5 kg (232 lb 8 oz)   01/15/18 101.6 kg (224 lb)   12/18/17 99.3 kg (219 lb)               Primary Care Provider Office Phone # Fax #    Reed Isacc Garcia [phone]       [address] [place] [address]        Equal Access to Services     [facility] Center[facility] : Hadii alda collinso Sojessica, waaxda luammyadaha, qaybta kaalmada adenannetteyada, genevieve mejia . So [facility] [phone].    ATENCIÓN: Si habla español, tiene a pearce disposición servicios gratuitos de asistencia lingüística. Llame al [phone].    We comply with applicable federal civil rights laws and [place] laws. We do not discriminate on the basis of race, color, national origin, age, disability, sex, sexual orientation, or gender identity.            Thank you!     Thank you for choosing [facility]  for your care. Our goal is always to provide you with excellent care. Hearing back from our patients is one way we can continue to improve our services. Please take a few minutes to complete the written survey that you may receive in the mail after your visit with us. Thank you!             Your Updated " Medication List - Protect others around you: Learn how to safely use, store and throw away your medicines at www.disposemymeds.org.          This list is accurate as of 3/6/18  2:59 PM.  Always use your most recent med list.                   Brand Name Dispense Instructions for use Diagnosis    enoxaparin 100 MG/ML injection    LOVENOX    60 Syringe    Inject 1 mL (100 mg) Subcutaneous every 12 hours    Deep vein thrombosis (DVT) (H), Long-term (current) use of anticoagulants       PRENATAL VITAMIN PO           ranitidine 75 MG tablet    ZANTAC    30 tablet    Take 2 tablets (150 mg) by mouth 2 times daily    Heartburn       VITAMIN D (CHOLECALCIFEROL) PO      Take 5,000 Units by mouth daily

## 2018-03-06 NOTE — PROGRESS NOTES
OB Visit Note    37 year old  at 24w1d by dates and US here today for follow-up OB visit.  Current pregnancy c/b velamentous cord insertion, currently being followed by serial ultrasounds per [facility]. Most recent US reassuring. She has additional h/o Factor V Leiden c/b DVT and PE (on therapeutic Lovenox) as well as cervical adenocarcinoma in situ with unremarkable Pap and HPV testing on 17.     Patient reports No Problems.  Has been feeling a bit more fatigued lately, which she attributes to stress at work as well as caring for her toddler son.  Does feel fetal movements.  Does have some AHUJA, but no dyspnea at rest, chest pain.  Denies frequent n/v/d.  Occasional episodes of lower abdominal tenderness, but tolerable per patient.  Denies vaginal bleeding, discharge.     Review Of Systems  Constitutional, HEENT, cardiovascular, pulmonary, gi and gu systems are negative, except as otherwise noted.    Exam:   GENERAL APPEARANCE: healthy, alert and no distress  EYES: EOMI,  PERRL  HENT: External ear canal normal and nose and mouth without ulcers or lesions  RESP: lungs clear to auscultation - no rales, rhonchi or wheezes  CV: regular rates and rhythm, normal S1 S2, no S3 or S4 and no murmur, click or rub -  ABDOMEN:  soft, nontender, palpable gravid uterus  Fetal heart tones auscultated with Doppler      Assessment: 37 year old  at 24w1d by dates c/w US findings here today for routine prenatal visit. She has h/o AIS and Factor V Leiden on Lovenox. Followed by [facility] for velamentous cord insertion with most recent US reassuring.  Reports doing well overall.     Plan:  1. RTC 4 weeks for EOB  2. Growth US 4-6 weeks  3. Factor Xa levels per primary Qtrimester  4. Rh negative- Rhogam next visit      Written by Magdalena Barajas, MS3, acting as scribe for Dr. Fernandez.  I, Dr. Fernandez, personally performed the services described in this documentation, as scribed by Magdalena Barajas in my presence, and it is both  accurate and complete.   Doreen Fernandez MD

## 2018-03-12 ENCOUNTER — DOCUMENTATION ONLY (OUTPATIENT)
Dept: LAB | Facility: CLINIC | Age: 38
End: 2018-03-12

## 2018-03-12 DIAGNOSIS — Z86.711 HISTORY OF PULMONARY EMBOLISM: ICD-10-CM

## 2018-03-12 DIAGNOSIS — D68.51 HOMOZYGOUS FACTOR V LEIDEN MUTATION (H): ICD-10-CM

## 2018-03-12 DIAGNOSIS — Z79.01 LONG-TERM (CURRENT) USE OF ANTICOAGULANTS: Primary | ICD-10-CM

## 2018-03-12 DIAGNOSIS — Z79.01 LONG-TERM (CURRENT) USE OF ANTICOAGULANTS: ICD-10-CM

## 2018-03-12 LAB — LMWH PPP CHRO-ACNC: 0.82 IU/ML

## 2018-03-12 PROCEDURE — 36415 COLL VENOUS BLD VENIPUNCTURE: CPT | Performed by: INTERNAL MEDICINE

## 2018-03-12 PROCEDURE — 85520 HEPARIN ASSAY: CPT | Performed by: INTERNAL MEDICINE

## 2018-04-03 ENCOUNTER — OFFICE VISIT (OUTPATIENT)
Dept: OBGYN | Facility: CLINIC | Age: 38
End: 2018-04-03
Attending: OBSTETRICS & GYNECOLOGY
Payer: COMMERCIAL

## 2018-04-03 VITALS
DIASTOLIC BLOOD PRESSURE: 70 MMHG | BODY MASS INDEX: 33.5 KG/M2 | SYSTOLIC BLOOD PRESSURE: 105 MMHG | WEIGHT: 239.3 LBS | HEIGHT: 71 IN | HEART RATE: 111 BPM

## 2018-04-03 DIAGNOSIS — O43.122 VELAMENTOUS INSERTION OF UMBILICAL CORD IN SECOND TRIMESTER: ICD-10-CM

## 2018-04-03 DIAGNOSIS — O43.193 MARGINAL INSERTION OF UMBILICAL CORD AFFECTING MANAGEMENT OF MOTHER IN THIRD TRIMESTER: ICD-10-CM

## 2018-04-03 DIAGNOSIS — O09.93 HIGH-RISK PREGNANCY IN THIRD TRIMESTER: Primary | ICD-10-CM

## 2018-04-03 LAB
DEPRECATED CALCIDIOL+CALCIFEROL SERPL-MC: 40 UG/L (ref 20–75)
ERYTHROCYTE [DISTWIDTH] IN BLOOD BY AUTOMATED COUNT: 13.2 % (ref 10–15)
GLUCOSE 1H P 50 G GLC PO SERPL-MCNC: 122 MG/DL (ref 60–129)
HCT VFR BLD AUTO: 37 % (ref 35–47)
HGB BLD-MCNC: 12.3 G/DL (ref 11.7–15.7)
MCH RBC QN AUTO: 31.5 PG (ref 26.5–33)
MCHC RBC AUTO-ENTMCNC: 33.2 G/DL (ref 31.5–36.5)
MCV RBC AUTO: 95 FL (ref 78–100)
PLATELET # BLD AUTO: 164 10E9/L (ref 150–450)
RBC # BLD AUTO: 3.9 10E12/L (ref 3.8–5.2)
T PALLIDUM IGG+IGM SER QL: NEGATIVE
WBC # BLD AUTO: 10.8 10E9/L (ref 4–11)

## 2018-04-03 PROCEDURE — G0463 HOSPITAL OUTPT CLINIC VISIT: HCPCS

## 2018-04-03 PROCEDURE — 86780 TREPONEMA PALLIDUM: CPT | Performed by: OBSTETRICS & GYNECOLOGY

## 2018-04-03 PROCEDURE — 36415 COLL VENOUS BLD VENIPUNCTURE: CPT | Performed by: OBSTETRICS & GYNECOLOGY

## 2018-04-03 PROCEDURE — 82950 GLUCOSE TEST: CPT | Performed by: OBSTETRICS & GYNECOLOGY

## 2018-04-03 PROCEDURE — 25000128 H RX IP 250 OP 636: Mod: ZF

## 2018-04-03 PROCEDURE — 82306 VITAMIN D 25 HYDROXY: CPT | Performed by: OBSTETRICS & GYNECOLOGY

## 2018-04-03 PROCEDURE — 90715 TDAP VACCINE 7 YRS/> IM: CPT | Mod: ZF

## 2018-04-03 PROCEDURE — 85027 COMPLETE CBC AUTOMATED: CPT | Performed by: OBSTETRICS & GYNECOLOGY

## 2018-04-03 PROCEDURE — 76816 OB US FOLLOW-UP PER FETUS: CPT | Mod: ZF

## 2018-04-03 PROCEDURE — 96372 THER/PROPH/DIAG INJ SC/IM: CPT

## 2018-04-03 PROCEDURE — 90471 IMMUNIZATION ADMIN: CPT

## 2018-04-03 NOTE — MR AVS SNAPSHOT
After Visit Summary   4/3/2018    Benita Olivo    MRN: [id number]           Patient Information     Date Of Birth          1980        Visit Information        Provider Department      4/3/2018 9:30 AM Doreen Fernandez MD [facility]        Today's Diagnoses     High-risk pregnancy in third trimester    -  1    Marginal insertion of umbilical cord affecting management of mother in third trimester           Follow-ups after your visit        Follow-up notes from your care team     Return in about 4 weeks (around 5/1/2018).      Your next 10 appointments already scheduled     Apr 23, 2018 11:30 AM CDT   RETURN CLOTTING DISORDER with Reed Garcia MD   [facility] ([facility])    [address]  [address]  [facility] [address]   [phone]            Apr 30, 2018  8:30 AM CDT   ULTRASOUND with [facility] ULTRASOUND   [facility] ([facility])    [facility] [address]  [address]  [facility] [id number] [phone]            Apr 30, 2018  9:00 AM CDT   RETURN OB with Rosina Leos MD   [facility] ([facility])    [facility] [address]  [address]  [facility] [id number] [phone]              Who to contact     Please call your clinic at [phone] to:    Ask questions about your health    Make or cancel appointments    Discuss your medicines    Learn about your test results    Speak to your doctor            Additional Information About Your Visit        MyChart Information     [employer] gives you secure access to your electronic health record. If you see a primary care provider, you can also send messages to your care team and make appointments. If you have questions, please call your primary care clinic.  If you do not have a primary care  "provider, please call [phone] and they will assist you.      [employer] is an electronic gateway that provides easy, online access to your medical records. With [employer], you can request a clinic appointment, read your test results, renew a prescription or communicate with your care team.     To access your existing account, please contact your [facility] or call [phone] for assistance.        Care EveryWhere ID     This is your Care EveryWhere ID. This could be used by other organizations to access your [place] medical records  [id number]        Your Vitals Were     Pulse Height Last Period BMI (Body Mass Index)          111 1.803 m (5' 11\") 09/18/2017 33.38 kg/m2         Blood Pressure from Last 3 Encounters:   04/03/18 105/70   03/06/18 111/74   01/15/18 102/69    Weight from Last 3 Encounters:   04/03/18 108.5 kg (239 lb 4.8 oz)   03/06/18 105.5 kg (232 lb 8 oz)   01/15/18 101.6 kg (224 lb)              We Performed the Following     25- OH-Vitamin D     Anti Treponema     CBC with platelets     Glucose 1 Hour     TDAP VACCINE (BOOSTRIX)          Today's Medication Changes          These changes are accurate as of 4/3/18 11:59 PM.  If you have any questions, ask your nurse or doctor.               Start taking these medicines.        Dose/Directions    order for DME   Used for:  High-risk pregnancy in third trimester   Started by:  Doreen Fernandez MD        Maternity Belt order   Quantity:  1 each   Refills:  0       rho(D) immune globulin 300 MCG injection   Commonly known as:  HYPERRHO/RHOGAM   Used for:  High-risk pregnancy in third trimester   Started by:  Doreen Fernandez MD        Dose:  300 mcg   Inject 300 mcg into the muscle once for 1 dose   Quantity:  1 each   Refills:  0            Where to get your medicines      Some of these will need a paper prescription and others can be bought over the counter.  Ask your nurse if you have questions.     " Bring a paper prescription for each of these medications     order for DME       You don't need a prescription for these medications     rho(D) immune globulin 300 MCG injection                Primary Care Provider Office Phone # Fax #    Reed Isacc Garcia [phone]       [address] [place] [address]        Equal Access to Services     MISAEL WISDOM : Hadii aad ku hadasho Soomaali, waaxda luqadaha, qaybta kaalmada adeegyada, genevieve pérez hayann brownjoseisreal patel. So [facility] [phone].    ATENCIÓN: Si habla español, tiene a pearce disposición servicios gratuitos de asistencia lingüística. Mily al [phone].    We comply with applicable federal civil rights laws and [place] laws. We do not discriminate on the basis of race, color, national origin, age, disability, sex, sexual orientation, or gender identity.            Thank you!     Thank you for choosing [facility]  for your care. Our goal is always to provide you with excellent care. Hearing back from our patients is one way we can continue to improve our services. Please take a few minutes to complete the written survey that you may receive in the mail after your visit with us. Thank you!             Your Updated Medication List - Protect others around you: Learn how to safely use, store and throw away your medicines at www.disposemymeds.org.          This list is accurate as of 4/3/18 11:59 PM.  Always use your most recent med list.                   Brand Name Dispense Instructions for use Diagnosis    enoxaparin 100 MG/ML injection    LOVENOX    60 Syringe    Inject 1 mL (100 mg) Subcutaneous every 12 hours    Deep vein thrombosis (DVT) (H), Long-term (current) use of anticoagulants       order for DME     1 each    Maternity Belt order    High-risk pregnancy in third trimester       PRENATAL VITAMIN PO           ranitidine 75 MG tablet    ZANTAC    30 tablet    Take 2 tablets (150 mg) by mouth 2  times daily    Heartburn       rho(D) immune globulin 300 MCG injection    HYPERRHO/RHOGAM    1 each    Inject 300 mcg into the muscle once for 1 dose    High-risk pregnancy in third trimester       VITAMIN D (CHOLECALCIFEROL) PO      Take 5,000 Units by mouth daily

## 2018-04-03 NOTE — MR AVS SNAPSHOT
After Visit Summary   4/3/2018    Benita Olivo    MRN: [id number]           Patient Information     Date Of Birth          1980        Visit Information        Provider Department      4/3/2018 9:00 AM [facility] ULTRASOUND [facility]        Today's Diagnoses     Velamentous insertion of umbilical cord in second trimester           Follow-ups after your visit        Your next 10 appointments already scheduled     Apr 23, 2018 11:30 AM CDT   RETURN CLOTTING DISORDER with Reed Garcia MD   [facility] ([facility])    [address]  [address]  [facility] [address]   [phone]            Apr 30, 2018  8:30 AM CDT   ULTRASOUND with [facility] ULTRASOUND   [facility] ([facility])    [facility] [address]  [address]  [address]  [facility] [id number] [phone]            Apr 30, 2018  9:00 AM CDT   RETURN OB with Rosina Leos MD   [facility] ([facility])    [facility] [address]  [address]  [address]  [facility] [id number] [phone]              Future tests that were ordered for you today     Open Future Orders        Priority Expected Expires Ordered    Growth Ultrasound 37926 Routine  8/1/2018 4/3/2018            Who to contact     Please call your clinic at [phone] to:    Ask questions about your health    Make or cancel appointments    Discuss your medicines    Learn about your test results    Speak to your doctor            Additional Information About Your Visit        MyChart Information     [employer] gives you secure access to your electronic health record. If you see a primary care provider, you can also send messages to your care team and make appointments. If you have questions, please call your primary care clinic.  If you do not have a primary care  provider, please call [phone] and they will assist you.      [employer] is an electronic gateway that provides easy, online access to your medical records. With [employer], you can request a clinic appointment, read your test results, renew a prescription or communicate with your care team.     To access your existing account, please contact your [facility] or call [phone] for assistance.        Care EveryWhere ID     This is your Care EveryWhere ID. This could be used by other organizations to access your [place] medical records  [id number]        Your Vitals Were     Last Period                   09/18/2017            Blood Pressure from Last 3 Encounters:   04/03/18 105/70   03/06/18 111/74   01/15/18 102/69    Weight from Last 3 Encounters:   04/03/18 108.5 kg (239 lb 4.8 oz)   03/06/18 105.5 kg (232 lb 8 oz)   01/15/18 101.6 kg (224 lb)              We Performed the Following     Growth Ultrasound [address]          Today's Medication Changes          These changes are accurate as of 4/3/18 11:59 PM.  If you have any questions, ask your nurse or doctor.               Start taking these medicines.        Dose/Directions    order for DME   Used for:  High-risk pregnancy in third trimester   Started by:  Doreen Fernandez MD        Maternity Belt order   Quantity:  1 each   Refills:  0       rho(D) immune globulin 300 MCG injection   Commonly known as:  HYPERRHO/RHOGAM   Used for:  High-risk pregnancy in third trimester   Started by:  Doreen Fernandez MD        Dose:  300 mcg   Inject 300 mcg into the muscle once for 1 dose   Quantity:  1 each   Refills:  0            Where to get your medicines      Some of these will need a paper prescription and others can be bought over the counter.  Ask your nurse if you have questions.     Bring a paper prescription for each of these medications     order for DME       You don't need a prescription for these medications      rho(D) immune globulin 300 MCG injection                Primary Care Provider Office Phone # Fax #    Reed Isacc Garcia [phone]       [address] [place] [address]        Equal Access to Services     MISAEL WISDOM : Hadii aad ku hadtanvir Mcconnell, waaxda luqadaha, qaybta kaalmada aderocioda, genevieve watkins jackie patel. So [facility] [phone].    ATENCIÓN: Si habla español, tiene a pearce disposición servicios gratuitos de asistencia lingüística. Llame al [phone].    We comply with applicable federal civil rights laws and [place] laws. We do not discriminate on the basis of race, color, national origin, age, disability, sex, sexual orientation, or gender identity.            Thank you!     Thank you for choosing [facility]  for your care. Our goal is always to provide you with excellent care. Hearing back from our patients is one way we can continue to improve our services. Please take a few minutes to complete the written survey that you may receive in the mail after your visit with us. Thank you!             Your Updated Medication List - Protect others around you: Learn how to safely use, store and throw away your medicines at www.disposemymeds.org.          This list is accurate as of 4/3/18 11:59 PM.  Always use your most recent med list.                   Brand Name Dispense Instructions for use Diagnosis    enoxaparin 100 MG/ML injection    LOVENOX    60 Syringe    Inject 1 mL (100 mg) Subcutaneous every 12 hours    Deep vein thrombosis (DVT) (H), Long-term (current) use of anticoagulants       order for DME     1 each    Maternity Belt order    High-risk pregnancy in third trimester       PRENATAL VITAMIN PO           ranitidine 75 MG tablet    ZANTAC    30 tablet    Take 2 tablets (150 mg) by mouth 2 times daily    Heartburn       rho(D) immune globulin 300 MCG injection    HYPERRHO/RHOGAM    1 each    Inject 300 mcg into the muscle  once for 1 dose    High-risk pregnancy in third trimester       VITAMIN D (CHOLECALCIFEROL) PO      Take 5,000 Units by mouth daily

## 2018-04-03 NOTE — PROGRESS NOTES
37 year old female, , presents at 28 1/7 weeks for obstetric ultrasound assessment indicated by velamentous cord insertion.    Single fetus    Presentation cephalic    USEGA = 30 0/7 weeks.  EFW = 1512 grams, 96 % for 28 weeks.      PRAMOD = 17.1cm.  FHR = 146bpm     Placenta anterior and grade 1      Findings discussed with patient.    Further studies with repeat growth in 4 weeks.        RACHELL Orourke MD

## 2018-04-03 NOTE — LETTER
"4/3/2018       RE: Benita Olivo  [address] [facility] [address]     Dear Colleague,    Thank you for referring your patient, Benita Olivo, to the [facility] at [facility]. Please see a copy of my visit note below.    [facility]    CC: JEROME    S:   Feeling well overall.  Just concerned because baby feels really big. Her last baby was 8lb 12oz and she thinks this one is on track to be bigger.  Taking the lovenox, but doesn't like it.  Causes bruising and lumps if she doesn't do it right.  Follows with Hematology.  Denies contractions, vaginal bleeding, LOF.  Confirms fetal movement.     O:  /70  Pulse 111  Ht 1.803 m (5' 11\")  Wt 108.5 kg (239 lb 4.8 oz)  LMP 2017  BMI 33.38 kg/m2     Gen: well appearing woman, NAD  Fundal height: 27 cm  FHR: 140s    A/P:  37 year old  at 28w1d by LMP, JEROME.    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   - Rh neg status    #PNC: routine ob labs reviewed   Rh neg, Rhogam today   Rubella immune   GCT, CBC, vit D, RPR, Tdap today   Contraception: didn't use any in the past, doesn't plan to use any   Plans to breastfeed   Maternity belt ordered today    #Velamentous vs marginal cord insertion: US 18 \"Placental cord insertion just on edge of placenta, velamentous insertion cannot be ruled out.\"  Placenta anterior  - Recommend growth US q 4-6weeks  - Growth US today: EFW 96%, PRAMOD 17cm  - Growth US in 4 weeks    #Genetic screening/ diagnosis:  - declined first trimester screening   - Level II US: normal anatomy (although suboptimal eval)    #FVL, h/o PE, DVT  - Therapeutic Lovenox 100mg BID  - Factor Xa q trimester - last check normal 0.82 (3/12/18)  - Factor Xa and follow-up with Dr. Garcia at 32 " weeks    #Adenocarcinoma in situ or cervix s/p CKC  - Cervical length normal 1/22/18, 2/5/18, 2/21/18  - Neg pap and HPV 12/18/17  - Discuss hysterectomy for definitive management after delivery/when childbearing complete    RTC 4 weeks    Eve Hathaway MD  OBGYN Resident PGY1  4/3/2018 10:36 AM    Patient seen and discussed with Dr. Fernandez    Appreciate note by Dr. Hathaway. Patient has been seen and examined by me with the resident, agree with above note.     Doreen Fernandez MD

## 2018-04-03 NOTE — PROGRESS NOTES
"[facility]    CC: JEROME    S:   Feeling well overall.  Just concerned because baby feels really big. Her last baby was 8lb 12oz and she thinks this one is on track to be bigger.  Taking the lovenox, but doesn't like it.  Causes bruising and lumps if she doesn't do it right.  Follows with Hematology.  Denies contractions, vaginal bleeding, LOF.  Confirms fetal movement.     O:  /70  Pulse 111  Ht 1.803 m (5' 11\")  Wt 108.5 kg (239 lb 4.8 oz)  LMP 2017  BMI 33.38 kg/m2     Gen: well appearing woman, NAD  Fundal height: 27 cm  FHR: 140s    A/P:  37 year old  at 28w1d by LMP, JEROME.    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   - Rh neg status    #PNC: routine ob labs reviewed   Rh neg, Rhogam today   Rubella immune   GCT, CBC, vit D, RPR, Tdap today   Contraception: didn't use any in the past, doesn't plan to use any   Plans to breastfeed   Maternity belt ordered today    #Velamentous vs marginal cord insertion: US 18 \"Placental cord insertion just on edge of placenta, velamentous insertion cannot be ruled out.\"  Placenta anterior  - Recommend growth US q 4-6weeks  - Growth US today: EFW 96%, PRAMOD 17cm  - Growth US in 4 weeks    #Genetic screening/ diagnosis:  - declined first trimester screening   - Level II US: normal anatomy (although suboptimal eval)    #FVL, h/o PE, DVT  - Therapeutic Lovenox 100mg BID  - Factor Xa q trimester - last check normal 0.82 (3/12/18)  - Factor Xa and follow-up with Dr. Garcia at 32 weeks    #Adenocarcinoma in situ or cervix s/p CKC  - Cervical length normal 18, 18, 18  - Neg pap and HPV 17  - Discuss hysterectomy for definitive management after delivery/when childbearing complete    RTC 4 weeks    Eve Hathaway MD  OBGYN Resident PGY1  4/3/2018 10:36 " AM    Patient seen and discussed with Dr. Fernandez    Appreciate note by Dr. Hathaway. Patient has been seen and examined by me with the resident, agree with above note.     Doreen Fernandez MD

## 2018-04-18 ENCOUNTER — DOCUMENTATION ONLY (OUTPATIENT)
Dept: LAB | Facility: CLINIC | Age: 38
End: 2018-04-18

## 2018-04-19 DIAGNOSIS — Z86.718 HISTORY OF DEEP VENOUS THROMBOSIS: Primary | ICD-10-CM

## 2018-04-20 DIAGNOSIS — Z86.718 HISTORY OF DEEP VENOUS THROMBOSIS: ICD-10-CM

## 2018-04-20 LAB — LMWH PPP CHRO-ACNC: 0.7 IU/ML

## 2018-04-20 PROCEDURE — 85520 HEPARIN ASSAY: CPT | Performed by: INTERNAL MEDICINE

## 2018-04-20 PROCEDURE — 36415 COLL VENOUS BLD VENIPUNCTURE: CPT | Performed by: INTERNAL MEDICINE

## 2018-04-23 ENCOUNTER — OFFICE VISIT (OUTPATIENT)
Dept: HEMATOLOGY | Facility: CLINIC | Age: 38
End: 2018-04-23
Attending: INTERNAL MEDICINE
Payer: COMMERCIAL

## 2018-04-23 VITALS
TEMPERATURE: 97.7 F | BODY MASS INDEX: 34.96 KG/M2 | HEART RATE: 97 BPM | DIASTOLIC BLOOD PRESSURE: 78 MMHG | OXYGEN SATURATION: 96 % | SYSTOLIC BLOOD PRESSURE: 125 MMHG | RESPIRATION RATE: 12 BRPM | HEIGHT: 70 IN | WEIGHT: 244.2 LBS

## 2018-04-23 DIAGNOSIS — Z79.01 CURRENT USE OF LONG TERM ANTICOAGULATION: Primary | ICD-10-CM

## 2018-04-23 DIAGNOSIS — D68.51 HOMOZYGOUS FACTOR V LEIDEN MUTATION (H): ICD-10-CM

## 2018-04-23 PROCEDURE — G0463 HOSPITAL OUTPT CLINIC VISIT: HCPCS

## 2018-04-23 PROCEDURE — 99215 OFFICE O/P EST HI 40 MIN: CPT | Performed by: INTERNAL MEDICINE

## 2018-04-23 ASSESSMENT — PAIN SCALES - GENERAL: PAINLEVEL: NO PAIN (0)

## 2018-04-23 NOTE — PROGRESS NOTES
[facility]  Outpatient Clinic Visit    Benita Olivo is a 37-year-old woman with history of recurrent venous thrombosis who is also homozygous for factor V Leiden.  She returns today with her  to discuss anticoagulation management around the time of delivery.  She is currently 31 weeks pregnant with a due date of 06/25/2018.  She plans to deliver here at the [facility].  This is her second child.  We were also involved with the management of her previous pregnancy.      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  Her first clotting event occurred in 11/2005 when she presented with right upper extremity deep vein thrombosis with associated pulmonary emboli.  This was a relatively unprovoked clot.  She had been intermittently using oral contraceptives for about 2 years prior to that event, and those were subsequently stopped.  However, it was not entirely clear if the event was provoked only by estrogen.  Subsequent testing revealed that she is homozygous for factor V Leiden.  At that time we had recommended strong consideration of longterm anticoagulation, but she chose to go off anticoagulant therapy after about 12 months.      She returned in 2012 with what was felt to be an unprovoked recurrent clot in the left upper extremity.  In addition, there was a strong family history of venous thromboembolism on her mother's side of the family, including a fatal pulmonary embolism in her maternal grandmother.  Thus, we again recommended longterm anticoagulation, and she has remained on that since that time without any difficulty.  She has done well on warfarin over the years.      As with her previous pregnancy, she has been managed with enoxaparin.  She is currently on 100 mg every 12 hours.  Her anti-Xa levels have been in the therapeutic range, although the most recent level is a bit lower than we would like it, and so we are going  to be making a dose adjustment (please see below).      Thus far, her pregnancy is progressing without any complication.  She has had no bleeding issues.  She is planning again for a timed induction of labor, as she would like to keep options for epidural or spinal anesthesia open.  She does plan to breastfeed for up to a year if possible.      She otherwise remains in good overall health.      PHYSICAL EXAMINATION:  A detailed exam was not performed today.      LABORATORY:  Most recent peak anti-Xa level was obtained on 04/20/2018 and was 0.7.        ASSESSMENT AND PLAN:   1.  History of recurrent upper extremity deep vein thrombosis and pulmonary embolism.   2.  Homozygous for factor V Leiden.   3.  Strong family history of venous thromboembolism including a fatal pulmonary embolism in her maternal grandmother.   4.  Currently 31 weeks pregnant.      I spent a total of 40 minutes today with Benita and her , all of which was in counseling and coordination of care.  Thus far, her pregnancy is progressing without complication, and she is doing well on anticoagulation.  Because her most recent anti-Xa level was at the low end of the target range, we are going to increase the dose of enoxaparin to 120 mg every 12 hours.  We will arrange for another anti-Xa level to be obtained in the next week.  Although standard target anti-Xa range is 0.6-1.0, our preference is to maintain pregnant women in the 0.8-1.2 range, to be sure that their trough levels remain adequate.  I do not expect we will need to make additional dose adjustments throughout the remainder of this pregnancy.      With regard to management around the time of delivery, we strongly recommend that she continue on enoxaparin at the full therapeutic dose right up until the time of delivery.  We would not recommend switching her to unfractionated heparin because of the unpredictable pharmacokinetics.  She needs to maintain stable therapeutic  anticoagulation to receive adequate protection from recurrent venous thromboembolism, particularly during the late stages of pregnancy when her clotting risk is reaching its peak.      As before, she is planning for a timed induction of labor.  Thus, we recommend that her last dose of enoxaparin be taken 24 hours prior to that.  This will allow sufficient time for the enoxaparin to clear such that she can have spinal or epidural anesthesia if needed or desired.  If she were to go into labor unexpectedly, she would need a 24-hour window of time from her last enoxaparin dose before she could have spinal or epidural anesthesia.      Following delivery, she should resume therapeutic anticoagulation within 12-24 hours unless there is active bleeding or high concern about the risk of bleeding.  We would recommend to resume the previous enoxaparin dose of 120 mg twice daily.  Once she mobilizes all of the fluid accumulated during pregnancy, we should be able to go back down to the 100 mg dose.      We discussed transition back to oral anticoagulation.  I explained that both enoxaparin and warfarin are considered safe for breastfeeding.  She is interested in the possibility of direct oral anticoagulants, but again those are not known to be safe for breastfeeding, and so until she is done with that, she will either need to use enoxaparin or warfarin.  She will contact us by telephone after delivery when she is ready to make the switch back to oral anticoagulation, and we can arrange for followup at that time.      During the course of our visit, she and her  asked a number of good questions and appeared to have a clear understanding of all things we reviewed.       Reed Garcia MD  Associate Professor of Medicine  Division of Hematology, Oncology, and Transplantation  Director, [facility]

## 2018-04-23 NOTE — MR AVS SNAPSHOT
After Visit Summary   4/23/2018    Benita Olivo    MRN: [id number]           Patient Information     Date Of Birth          1980        Visit Information        Provider Department      4/23/2018 11:30 AM Reed Garcia MD [facility]        Care Instructions    1.Increase your enoxaparin dose to 120 mg every 12 hours.  2. Check Heparin level this Friday.   3. Call the Center postpartum.           Follow-ups after your visit        Your next 10 appointments already scheduled     Apr 30, 2018  8:30 AM CDT   ULTRASOUND with [facility] ULTRASOUND   [facility] ([facility])    [facility] [address]  [address]  [facility] [address] [phone]            Apr 30, 2018  9:00 AM CDT   RETURN OB with Rosina Leos MD   [facility] ([facility])    [facility] [address]  [address]  [facility] [address] [phone]              Who to contact     If you have questions or need follow up information about today's clinic visit or your schedule please contact [facility] directly at [phone].  Normal or non-critical lab and imaging results will be communicated to you by [employer], letter or phone within 4 business days after the clinic has received the results. If you do not hear from us within 7 days, please contact the clinic through [employer] or phone. If you have a critical or abnormal lab result, we will notify you by phone as soon as possible.  Submit refill requests through KnowNow or call your pharmacy and they will forward the refill request to us. Please allow 3 business days for your refill to be completed.          Additional Information About Your Visit        MyChart Information     KnowNow gives you secure access to your electronic health record. If you see a primary care provider, you can  "also send messages to your care team and make appointments. If you have questions, please call your primary care clinic.  If you do not have a primary care provider, please call [phone] and they will assist you.        Care EveryWhere ID     This is your Care EveryWhere ID. This could be used by other organizations to access your [facility] medical records  [id number]        Your Vitals Were     Pulse Temperature Respirations Height Last Period Pulse Oximetry    97 97.7  F (36.5  C) (Oral) 12 1.77 m (5' 9.7\") 09/18/2017 96%    BMI (Body Mass Index)                   35.34 kg/m2            Blood Pressure from Last 3 Encounters:   04/23/18 125/78   04/03/18 105/70   03/06/18 111/74    Weight from Last 3 Encounters:   04/23/18 110.8 kg (244 lb 3.2 oz)   04/03/18 108.5 kg (239 lb 4.8 oz)   03/06/18 105.5 kg (232 lb 8 oz)              Today, you had the following     No orders found for display       Primary Care Provider Office Phone # Fax #    Reed Isacc Garcia [phone]       [address] [place] [address] [address]  [place] [address]        Equal Access to Services     MISAEL WISDOM AH: Hadii alda mariscal hadasho Soomaali, waaxda luqadaha, qaybta kaalmada adeegyada, genevieve patel. So [facility] [phone].    ATENCIÓN: Si habla español, tiene a pearce disposición servicios gratuitos de asistencia lingüística. Llame al [phone].    We comply with applicable federal civil rights laws and [place] laws. We do not discriminate on the basis of race, color, national origin, age, disability, sex, sexual orientation, or gender identity.            Thank you!     Thank you for choosing [facility]  for your care. Our goal is always to provide you with excellent care. Hearing back from our patients is one way we can continue to improve our services. Please take a few minutes to complete the written survey that you may receive in the mail after your visit with us. Thank " you!             Your Updated Medication List - Protect others around you: Learn how to safely use, store and throw away your medicines at www.disposemymeds.org.          This list is accurate as of 4/23/18  1:03 PM.  Always use your most recent med list.                   Brand Name Dispense Instructions for use Diagnosis    enoxaparin 100 MG/ML injection    LOVENOX    60 Syringe    Inject 1 mL (100 mg) Subcutaneous every 12 hours    Deep vein thrombosis (DVT) (H), Long-term (current) use of anticoagulants       order for DME     1 each    Maternity Belt order    High-risk pregnancy in third trimester       PRENATAL VITAMIN PO           ranitidine 75 MG tablet    ZANTAC    30 tablet    Take 2 tablets (150 mg) by mouth 2 times daily    Heartburn       VITAMIN D (CHOLECALCIFEROL) PO      Take 5,000 Units by mouth daily

## 2018-04-23 NOTE — PATIENT INSTRUCTIONS
1.Increase your enoxaparin dose to 120 mg every 12 hours.  2. Check Heparin level this Friday.   3. Call the Center postpartum.

## 2018-04-24 NOTE — NURSING NOTE
Patient with history of recurrent VTE and is homozygous for Factor V Leiden currently 31 weeks pregnant here with her   After discussion with Dr. lehman it was decided that she would  Increase her enoxaparin to 120 mg every 12 hours. Anticoagulation recommendations for peripartum period discussed (see progress note for details)  VTE risk factors, signs, and symptoms discussed.   Enoxaparin reviewed and subcutaneous injections discussed.  Heparin level discussed.  Patient instructions reviewed.  Family has the contact information for the Center and encouraged to call with questions or concerns.

## 2018-04-27 DIAGNOSIS — Z86.718 HISTORY OF DEEP VENOUS THROMBOSIS: ICD-10-CM

## 2018-04-27 LAB — LMWH PPP CHRO-ACNC: 0.78 IU/ML

## 2018-04-27 PROCEDURE — 85520 HEPARIN ASSAY: CPT | Performed by: INTERNAL MEDICINE

## 2018-04-27 PROCEDURE — 36415 COLL VENOUS BLD VENIPUNCTURE: CPT | Performed by: INTERNAL MEDICINE

## 2018-04-30 ENCOUNTER — OFFICE VISIT (OUTPATIENT)
Dept: OBGYN | Facility: CLINIC | Age: 38
End: 2018-04-30
Attending: OBSTETRICS & GYNECOLOGY
Payer: COMMERCIAL

## 2018-04-30 VITALS
HEART RATE: 91 BPM | BODY MASS INDEX: 35.63 KG/M2 | DIASTOLIC BLOOD PRESSURE: 71 MMHG | WEIGHT: 246.2 LBS | SYSTOLIC BLOOD PRESSURE: 109 MMHG

## 2018-04-30 DIAGNOSIS — O43.123 VELAMENTOUS INSERTION OF UMBILICAL CORD, THIRD TRIMESTER: ICD-10-CM

## 2018-04-30 DIAGNOSIS — O09.529 SUPERVISION OF HIGH-RISK PREGNANCY OF ELDERLY MULTIGRAVIDA: Primary | ICD-10-CM

## 2018-04-30 DIAGNOSIS — O43.193 MARGINAL INSERTION OF UMBILICAL CORD AFFECTING MANAGEMENT OF MOTHER IN THIRD TRIMESTER: ICD-10-CM

## 2018-04-30 PROCEDURE — 76816 OB US FOLLOW-UP PER FETUS: CPT | Mod: ZF

## 2018-04-30 PROCEDURE — G0463 HOSPITAL OUTPT CLINIC VISIT: HCPCS | Mod: ZF

## 2018-04-30 NOTE — PROGRESS NOTES
37 year old female, , presents at 32 0/7 weeks for obstetric ultrasound assessment indicated by velamentous cord insertion.    Single fetus    Presentation cephalic    USEGA = 34 6/7 weeks.  EFW = 2788 grams, >99 % for 32 weeks. AC>99%      PRAMOD = 20.7cm.  FHR = 161bpm     Placenta anterior and grade 1    Comments:  LGA with overall >99%.     Findings discussed with patient per Dr. Leos.     Further studies with serial growth u/S as previously recommended.        RACHELL Orourke MD, [facility] Staff  OB/GYN    2018  2:15 PM

## 2018-04-30 NOTE — MR AVS SNAPSHOT
After Visit Summary   4/30/2018    Benita Olivo    MRN: [id number]           Patient Information     Date Of Birth          1980        Visit Information        Provider Department      4/30/2018 8:30 AM [facility] ULTRASOUND [facility]        Today's Diagnoses     Marginal insertion of umbilical cord affecting management of mother in third trimester           Follow-ups after your visit        Future tests that were ordered for you today     Open Future Orders        Priority Expected Expires Ordered    Growth Ultrasound 78200 Routine 5/21/2018 8/28/2018 4/30/2018            Who to contact     Please call your clinic at [phone] to:    Ask questions about your health    Make or cancel appointments    Discuss your medicines    Learn about your test results    Speak to your doctor            Additional Information About Your Visit        Doctor on [employer]and[employer] Information     [employer] Machine gives you secure access to your electronic health record. If you see a primary care provider, you can also send messages to your care team and make appointments. If you have questions, please call your primary care clinic.  If you do not have a primary care provider, please call [phone] and they will assist you.      [employer] Machine is an electronic gateway that provides easy, online access to your medical records. With [employer] Machine, you can request a clinic appointment, read your test results, renew a prescription or communicate with your care team.     To access your existing account, please contact your [facility] or call [phone] for assistance.        Care EveryWhere ID     This is your Care EveryWhere ID. This could be used by other organizations to access your [facility] medical records  [id number]        Your Vitals Were     Last Period                   09/18/2017            Blood Pressure from Last 3 Encounters:   04/30/18 109/71   04/23/18 125/78   04/03/18 105/70     Weight from Last 3 Encounters:   04/30/18 111.7 kg (246 lb 3.2 oz)   04/23/18 110.8 kg (244 lb 3.2 oz)   04/03/18 108.5 kg (239 lb 4.8 oz)              We Performed the Following     Growth Ultrasound 43308        Primary Care Provider Office Phone # Fax #    Reed Garcia [phone]       [address] [facility] [address]  [facility] [address]        Equal Access to Services     MISAEL WISDOM : Hadii aad ku hadasho Soomaali, waaxda luqadaha, qaybta kaalmada adeegyada, waxay idiin hayaan adeeg kharaisreal laethan patel. So [facility] [phone].    ATENCIÓN: Si habla español, tiene a pearce disposición servicios gratuitos de asistencia lingüística. [facility] [phone].    We comply with applicable federal civil rights laws and [place] laws. We do not discriminate on the basis of race, color, national origin, age, disability, sex, sexual orientation, or gender identity.            Thank you!     Thank you for choosing [facility]  for your care. Our goal is always to provide you with excellent care. Hearing back from our patients is one way we can continue to improve our services. Please take a few minutes to complete the written survey that you may receive in the mail after your visit with us. Thank you!             Your Updated Medication List - Protect others around you: Learn how to safely use, store and throw away your medicines at www.disposemymeds.org.          This list is accurate as of 4/30/18 11:59 PM.  Always use your most recent med list.                   Brand Name Dispense Instructions for use Diagnosis    enoxaparin 120 MG/0.8ML injection    LOVENOX    60 Syringe    Inject 0.8 mLs (120 mg) Subcutaneous every 12 hours    Current use of long term anticoagulation, Homozygous Factor V Leiden mutation (H)       order for DME     1 each    Maternity Belt order    High-risk pregnancy in third trimester       PRENATAL VITAMIN PO           ranitidine 75 MG tablet    ZANTAC    30 tablet     Take 2 tablets (150 mg) by mouth 2 times daily    Heartburn       VITAMIN D (CHOLECALCIFEROL) PO      Take 5,000 Units by mouth daily

## 2018-04-30 NOTE — MR AVS SNAPSHOT
After Visit Summary   4/30/2018    Benita Olivo    MRN: [id number]           Patient Information     Date Of Birth          1980        Visit Information        Provider Department      4/30/2018 9:00 AM Rosina Leos MD [facility]        Today's Diagnoses     Supervision of high-risk pregnancy of elderly multigravida- Discuss at high risk rounds    -  1    Velamentous insertion of umbilical cord, third trimester           Follow-ups after your visit        Follow-up notes from your care team     Return in about 2 weeks (around 5/14/2018).      Future tests that were ordered for you today     Open Future Orders        Priority Expected Expires Ordered    Growth Ultrasound 84341 Routine 5/21/2018 8/28/2018 4/30/2018            Who to contact     Please call your clinic at [phone] to:    Ask questions about your health    Make or cancel appointments    Discuss your medicines    Learn about your test results    Speak to your doctor            Additional Information About Your Visit        Therasishart Information     InsideView gives you secure access to your electronic health record. If you see a primary care provider, you can also send messages to your care team and make appointments. If you have questions, please call your primary care clinic.  If you do not have a primary care provider, please call [phone] and they will assist you.      InsideView is an electronic gateway that provides easy, online access to your medical records. With InsideView, you can request a clinic appointment, read your test results, renew a prescription or communicate with your care team.     To access your existing account, please contact your [facility] or call [phone] for assistance.        Care EveryWhere ID     This is your Care EveryWhere ID. This could be used by other organizations to access your [facility] medical records  [id number]        Your  Vitals Were     Pulse Last Period BMI (Body Mass Index)             91 09/18/2017 35.63 kg/m2          Blood Pressure from Last 3 Encounters:   04/30/18 109/71   04/23/18 125/78   04/03/18 105/70    Weight from Last 3 Encounters:   04/30/18 111.7 kg (246 lb 3.2 oz)   04/23/18 110.8 kg (244 lb 3.2 oz)   04/03/18 108.5 kg (239 lb 4.8 oz)               Primary Care Provider Office Phone # Fax #    Reed Isacc Garcia [phone]       [address] [facility] [address]  [facility] [address]        Equal Access to Services     MISAEL WISDOM : Kam Mcconnell, ede fischer, lamar morris, genevieve patel. So [facility] [phone].    ATENCIÓN: Si habla español, tiene a pearce disposición servicios gratuitos de asistencia lingüística. Llame al [phone].    We comply with applicable federal civil rights laws and [place] laws. We do not discriminate on the basis of race, color, national origin, age, disability, sex, sexual orientation, or gender identity.            Thank you!     Thank you for choosing [facility]  for your care. Our goal is always to provide you with excellent care. Hearing back from our patients is one way we can continue to improve our services. Please take a few minutes to complete the written survey that you may receive in the mail after your visit with us. Thank you!             Your Updated Medication List - Protect others around you: Learn how to safely use, store and throw away your medicines at www.disposemymeds.org.          This list is accurate as of 4/30/18  4:09 PM.  Always use your most recent med list.                   Brand Name Dispense Instructions for use Diagnosis    enoxaparin 120 MG/0.8ML injection    LOVENOX    60 Syringe    Inject 0.8 mLs (120 mg) Subcutaneous every 12 hours    Current use of long term anticoagulation, Homozygous Factor V Leiden mutation (H)       order for DME     1 each    Maternity  Belt order    High-risk pregnancy in third trimester       PRENATAL VITAMIN PO           ranitidine 75 MG tablet    ZANTAC    30 tablet    Take 2 tablets (150 mg) by mouth 2 times daily    Heartburn       VITAMIN D (CHOLECALCIFEROL) PO      Take 5,000 Units by mouth daily

## 2018-04-30 NOTE — PROGRESS NOTES
"[facility]    CC: JEROME    S:   Benita Olivo is a 37 year old  at 32w0d here for JEROME visit. Feeling well overall.   Denies contractions, vaginal bleeding, LOF.  Confirms fetal movement. Feels some shortness of breath on random occasions but spontaneously resolves after a few minutes, nothing persistent. Denies chest pain.     O:  /71 (BP Location: Right arm, Patient Position: Chair)  Pulse 91  Wt 111.7 kg (246 lb 3.2 oz)  LMP 2017  BMI 35.63 kg/m2     See OB flowsheet    A/P:  37 year old  at 32w0d by LMP, JEROME.    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 Garcia)  - Factor Xa q trimester - last check 0.78 on   - plan for IOL 39 weeks, Lovenox up to 24 hours prior to induction, then resume 12-24 hours post delivery back at 120mg BID  - she is to call heme by phone after delivery when she is ready to transition back to oral anticoagulation    #Adenocarcinoma in situ or cervix s/p CKC  - Cervical length normal 18, 18, 18  - Neg pap and HPV 17  - Discuss hysterectomy for definitive management after delivery/when childbearing complete    RTC 2 weeks    Rosina Leos MD  2018          "

## 2018-04-30 NOTE — LETTER
"2018       RE: Benita Olivo  [address] [facility] [address]     Dear Colleague,    Thank you for referring your patient, Benita Olivo, to the [facility] at [facility]. Please see a copy of my visit note below.    [facility]    CC: JEROME    S:   Benita Olivo is a 37 year old  at 32w0d here for JEROME visit. Feeling well overall.   Denies contractions, vaginal bleeding, LOF.  Confirms fetal movement. Feels some shortness of breath on random occasions but spontaneously resolves after a few minutes, nothing persistent. Denies chest pain.     O:  /71 (BP Location: Right arm, Patient Position: Chair)  Pulse 91  Wt 111.7 kg (246 lb 3.2 oz)  LMP 2017  BMI 35.63 kg/m2     See OB flowsheet    A/P:  37 year old  at 32w0d by LMP, JEROME.    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 Garcia)  - Factor Xa q trimester - last check 0.78 on   - plan for IOL 39 weeks, Lovenox up to 24 hours prior to induction, then resume 12-24 hours post delivery back at 120mg BID  - she is to call heme by phone after delivery when she is ready to transition back to oral " anticoagulation    #Adenocarcinoma in situ or cervix s/p CKC  - Cervical length normal 1/22/18, 2/5/18, 2/21/18  - Neg pap and HPV 12/18/17  - Discuss hysterectomy for definitive management after delivery/when childbearing complete    RTC 2 weeks      Again, thank you for allowing me to participate in the care of your patient.      Sincerely,    Rosina Leos MD

## 2018-04-30 NOTE — LETTER
2018     RE: Benita Olivo  [address] [facility] [address]     Dear Colleague,    Thank you for referring your patient, Benita Olivo, to the [facility] at [facility]. Please see a copy of my visit note below.    37 year old female, , presents at 32 0/7 weeks for obstetric ultrasound assessment indicated by velamentous cord insertion.    Single fetus    Presentation cephalic    USEGA = 34 6/7 weeks.  EFW = 2788 grams, >99 % for 32 weeks. AC>99%    PRAMOD = 20.7cm.  FHR = 161bpm     Placenta anterior and grade 1    Comments:  LGA with overall >99%.     Findings discussed with patient per Dr. Leos.     Further studies with serial growth u/S as previously recommended.    RACHELL Orourke MD, [facility] Special[facility] Staff  OB/GYN  2018  2:15 PM

## 2018-04-30 NOTE — NURSING NOTE
Chief Complaint   Patient presents with     Prenatal Care     32w0d; out of breath with just talking       See ALEX Flowers 4/30/2018

## 2018-05-17 ENCOUNTER — TELEPHONE (OUTPATIENT)
Dept: OBGYN | Facility: CLINIC | Age: 38
End: 2018-05-17

## 2018-05-17 ENCOUNTER — OFFICE VISIT (OUTPATIENT)
Dept: OBGYN | Facility: CLINIC | Age: 38
End: 2018-05-17
Attending: OBSTETRICS & GYNECOLOGY
Payer: COMMERCIAL

## 2018-05-17 VITALS
SYSTOLIC BLOOD PRESSURE: 105 MMHG | HEART RATE: 89 BPM | WEIGHT: 246 LBS | BODY MASS INDEX: 35.6 KG/M2 | DIASTOLIC BLOOD PRESSURE: 71 MMHG

## 2018-05-17 DIAGNOSIS — O92.70 DISORDER OF LACTATION: ICD-10-CM

## 2018-05-17 DIAGNOSIS — R12 HEARTBURN: ICD-10-CM

## 2018-05-17 DIAGNOSIS — O09.93 HIGH-RISK PREGNANCY IN THIRD TRIMESTER: Primary | ICD-10-CM

## 2018-05-17 PROCEDURE — G0463 HOSPITAL OUTPT CLINIC VISIT: HCPCS | Mod: ZF

## 2018-05-17 RX ORDER — BREAST PUMP
1 EACH MISCELLANEOUS PRN
Qty: 1 EACH | Refills: 0 | Status: SHIPPED | OUTPATIENT
Start: 2018-05-17 | End: 2018-08-07

## 2018-05-17 RX ORDER — BREAST PUMP
1 EACH MISCELLANEOUS PRN
Qty: 1 EACH | Refills: 0 | Status: SHIPPED | OUTPATIENT
Start: 2018-05-17 | End: 2018-05-17

## 2018-05-17 NOTE — PROGRESS NOTES
"Doing well. Good movement, no ctx, lof, vb. Feeling \"big.\"   O: /71  Pulse 89  Wt 111.6 kg (246 lb)  LMP 2017  Breastfeeding? No  BMI 35.6 kg/m2  A/P: 37 year old  @34w3d   LGA fetus: Last growth 99%ile. Has repeat in 2 weeks. Will make delivery planning at next visit based on EFW. Discussed primary CS for >5000g at time of delivery (39wks). Last baby  [id number].   Homozygous Factor V, H/o PE: On therapeutic lovenox. Plan IOL or CS at 39 wks  H/o AIS: Consider hysterectomy when done child bearing  RTC 2 wks JEROME and US    Doreen Fernandez MD      "

## 2018-05-17 NOTE — MR AVS SNAPSHOT
After Visit Summary   5/17/2018    Benita Olivo    MRN: [id number]           Patient Information     Date Of Birth          1980        Visit Information        Provider Department      5/17/2018 3:30 PM Doreen Fernandez MD [facility]        Today's Diagnoses     High-risk pregnancy in third trimester    -  1    Heartburn        Disorder of lactation           Follow-ups after your visit        Follow-up notes from your care team     Return in about 2 weeks (around 5/31/2018).      Your next 10 appointments already scheduled     May 31, 2018  2:30 PM CDT   ULTRASOUND with [facility] ULTRASOUND   [facility] ([facility])    [facility] [address]  [address]  [facility] [id number] [phone]            May 31, 2018  3:00 PM CDT   RETURN OB with Doreen Fernandez MD   [facility] ([facility])    [facility] [address]  [address]  [facility] [id number] [phone]            Jun 05, 2018  8:15 AM CDT   RETURN OB with Doreen Fernandez MD   [facility] ([facility])    [facility] [address]  [address]  [facility] [id number] [phone]              Who to contact     Please call your clinic at [phone] to:    Ask questions about your health    Make or cancel appointments    Discuss your medicines    Learn about your test results    Speak to your doctor            Additional Information About Your Visit        [employer] Information     [employer]s gives you secure access to your electronic health record. If you see a primary care provider, you can also send messages to your care team and make appointments. If you have questions, please call your primary care clinic.  If you do not have a primary care provider, please call [phone] and they will assist you.       [employer] is an electronic gateway that provides easy, online access to your medical records. With [employer], you can request a clinic appointment, read your test results, renew a prescription or communicate with your care team.     To access your existing account, please contact your [facility] or call [phone] for assistance.        Care EveryWhere ID     This is your Care EveryWhere ID. This could be used by other organizations to access your [place] medical records  [id number]        Your Vitals Were     Pulse Last Period Breastfeeding? BMI (Body Mass Index)          89 09/18/2017 No 35.6 kg/m2         Blood Pressure from Last 3 Encounters:   05/17/18 105/71   04/30/18 109/71   04/23/18 125/78    Weight from Last 3 Encounters:   05/17/18 111.6 kg (246 lb)   04/30/18 111.7 kg (246 lb 3.2 oz)   04/23/18 110.8 kg (244 lb 3.2 oz)              Today, you had the following     No orders found for display         Today's Medication Changes          These changes are accurate as of 5/17/18  6:00 PM.  If you have any questions, ask your nurse or doctor.               Start taking these medicines.        Dose/Directions    breast pump Misc   Used for:  Disorder of lactation   Started by:  Doreen Fernandez MD        Dose:  1 each   1 each as needed   Quantity:  1 each   Refills:  0            Where to get your medicines      These medications were sent to [place] [address] [place] [facility] [address]  [address] [address]     Phone:  [phone]     ranitidine 75 MG tablet         Some of these will need a paper prescription and others can be bought over the counter.  Ask your nurse if you have questions.     Bring a paper prescription for each of these medications     breast pump Misc                Primary Care Provider Office Phone # Fax #    Reed Garcia [phone]       [address] [place] [address]  [facility] [address]        [facility]  Access to Services     [facility]: Hadii aad ku hadumabeckie Niteshali, wamateusda luqduarte, qatiffany bertgunnergenevieve june. So [facility] [phone].    ATENCIÓN: Si habla español, tiene a pearce disposición servicios gratuitos de asistencia lingüística. Llame al [phone].    We comply with applicable federal civil rights laws and [place] laws. We do not discriminate on the basis of race, color, national origin, age, disability, sex, sexual orientation, or gender identity.            Thank you!     Thank you for choosing [facility]  for your care. Our goal is always to provide you with excellent care. Hearing back from our patients is one way we can continue to improve our services. Please take a few minutes to complete the written survey that you may receive in the mail after your visit with us. Thank you!             Your Updated Medication List - Protect others around you: Learn how to safely use, store and throw away your medicines at www.disposemymeds.org.          This list is accurate as of 5/17/18  6:00 PM.  Always use your most recent med list.                   Brand Name Dispense Instructions for use Diagnosis    breast pump Misc     1 each    1 each as needed    Disorder of lactation       enoxaparin 120 MG/0.8ML injection    LOVENOX    60 Syringe    Inject 0.8 mLs (120 mg) Subcutaneous every 12 hours    Current use of long term anticoagulation, Homozygous Factor V Leiden mutation (H)       order for DME     1 each    Maternity Belt order    High-risk pregnancy in third trimester       PRENATAL VITAMIN PO           ranitidine 75 MG tablet    ZANTAC    30 tablet    Take 2 tablets (150 mg) by mouth 2 times daily    Heartburn       VITAMIN D (CHOLECALCIFEROL) PO      Take 5,000 Units by mouth daily

## 2018-05-31 ENCOUNTER — OFFICE VISIT (OUTPATIENT)
Dept: OBGYN | Facility: CLINIC | Age: 38
End: 2018-05-31
Attending: OBSTETRICS & GYNECOLOGY
Payer: COMMERCIAL

## 2018-05-31 DIAGNOSIS — O43.123 VELAMENTOUS INSERTION OF UMBILICAL CORD, THIRD TRIMESTER: ICD-10-CM

## 2018-05-31 DIAGNOSIS — O36.60X0 EXCESSIVE FETAL GROWTH AFFECTING MANAGEMENT OF PREGNANCY, ANTEPARTUM, SINGLE OR UNSPECIFIED FETUS: ICD-10-CM

## 2018-05-31 DIAGNOSIS — O36.63X0 EXCESSIVE FETAL GROWTH AFFECTING MANAGEMENT OF PREGNANCY IN THIRD TRIMESTER, SINGLE OR UNSPECIFIED FETUS: Primary | ICD-10-CM

## 2018-05-31 DIAGNOSIS — O09.93 HIGH-RISK PREGNANCY IN THIRD TRIMESTER: Primary | ICD-10-CM

## 2018-05-31 DIAGNOSIS — O36.63X0 EXCESSIVE FETAL GROWTH AFFECTING MANAGEMENT OF PREGNANCY IN THIRD TRIMESTER, SINGLE OR UNSPECIFIED FETUS: ICD-10-CM

## 2018-05-31 PROCEDURE — 87653 STREP B DNA AMP PROBE: CPT | Performed by: OBSTETRICS & GYNECOLOGY

## 2018-05-31 PROCEDURE — 76816 OB US FOLLOW-UP PER FETUS: CPT | Mod: ZF

## 2018-05-31 RX ORDER — SODIUM CHLORIDE, SODIUM LACTATE, POTASSIUM CHLORIDE, CALCIUM CHLORIDE 600; 310; 30; 20 MG/100ML; MG/100ML; MG/100ML; MG/100ML
INJECTION, SOLUTION INTRAVENOUS CONTINUOUS
Status: CANCELLED | OUTPATIENT
Start: 2018-05-31

## 2018-05-31 RX ORDER — CITRIC ACID/SODIUM CITRATE 334-500MG
30 SOLUTION, ORAL ORAL
Status: CANCELLED | OUTPATIENT
Start: 2018-05-31

## 2018-05-31 RX ORDER — CEFAZOLIN SODIUM 1 G/3ML
1 INJECTION, POWDER, FOR SOLUTION INTRAMUSCULAR; INTRAVENOUS SEE ADMIN INSTRUCTIONS
Status: CANCELLED | OUTPATIENT
Start: 2018-05-31

## 2018-05-31 NOTE — LETTER
2018     RE: Benita Olivo  [address] [facility] [address]     Dear Colleague,    Thank you for referring your patient, Benita Olivo, to the [facility] at [facility]. Please see a copy of my visit note below.    Subjective:      37 year old  at 36w3d complicated by factor V leiden on lovenox who presents for a routine prenatal appointment.         No vaginal bleeding,  leakage of fluid, or change in vaginal discharge.  No contractions.  Normal fetal movement.     No HA, visual changes, RUQ or epigastric pain.   Patient concerns: STD paperwork. Still taking Lovenox, no symptoms of leg swelling or pain or SOB. Feeling well overall.     Objective:   See OB flowsheet    Assessment/Plan  #Prenatal Care  -Rh neg, Ab neg - s/p Rhogam   -GBS done today    #Factor V Leiden  -Continuing lovenox  -discussed holding 24hr before c/s and resuming 12-24hr after c/s    #LGA  -Discussed risks of vaginal birth with a baby currently measuring >4700g, would be projected to be >5000g at 39 wks. Discussed risks and benefits of a c/s, including the c/s process. Patient agreed with plan for c/s, but was tearful. Advised returning with further questions and discussion on next visit.      GBS screening: Obtained.  Labor signs discussed. Reinforced daily fetal movement counts.  Reviewed why/how to contact provider if headache/visual changes/RUQ or epigastric pain, decreased fetal movement, vaginal bleeding, leakage of fluid.   Return to clinic in 1 week and prn if questions or concerns.     I, Abby Oliva, am serving as a scribe; to document services personally performed by  Dr. Fernandez based on data collection and the provider's statements to me.     Abby Oliva  I, Dr. Fernandez, personally performed the services described in this documentation, as scribed by Abby Campbell in my presence, and it is both accurate and complete.   Doreen  Lincoln Fernandez MD

## 2018-05-31 NOTE — MR AVS SNAPSHOT
After Visit Summary   5/31/2018    Benita Olivo    MRN: [id number]           Patient Information     Date Of Birth          1980        Visit Information        Provider Department      5/31/2018 3:00 PM Doreen Fernandez MD [facility]        Today's Diagnoses     High-risk pregnancy in third trimester    -  1    Excessive fetal growth affecting management of pregnancy, antepartum, single or unspecified fetus        Excessive fetal growth affecting management of pregnancy in third trimester, single or unspecified fetus           Follow-ups after your visit        Your next 10 appointments already scheduled     Jun 05, 2018  8:15 AM CDT   RETURN OB with Doreen Fernandez MD   [facility] ([facility])    [facility] [address]  [address]  [address]  [facility] [place] [address] [phone]            Jun 18, 2018   Procedure with Junie Hightower MD   [facility]B (--)    [address] [facility] [address] [phone]              Who to contact     Please call your clinic at [phone] to:    Ask questions about your health    Make or cancel appointments    Discuss your medicines    Learn about your test results    Speak to your doctor            Additional Information About Your Visit        [employer] Information     [employer] gives you secure access to your electronic health record. If you see a primary care provider, you can also send messages to your care team and make appointments. If you have questions, please call your primary care clinic.  If you do not have a primary care provider, please call [phone] and they will assist you.      [employer] is an electronic gateway that provides easy, online access to your medical records. With [employer], you can request a clinic appointment, read your test results, renew a prescription or communicate with your care team.     To access your existing account, please  contact your [facility] or call [phone] for assistance.        Care EveryWhere ID     This is your Care EveryWhere ID. This could be used by other organizations to access your [place] medical records  [id number]        Your Vitals Were     Last Period                   2017            Blood Pressure from Last 3 Encounters:   18 105/71   18 109/71   18 125/78    Weight from Last 3 Encounters:   18 111.6 kg (246 lb)   18 111.7 kg (246 lb 3.2 oz)   18 110.8 kg (244 lb 3.2 oz)              We Performed the Following     Group B strep PCR     Na-Operative Worksheet (Primary  Section)        Primary Care Provider Office Phone # Fax #    Reed Isacc Garcia [phone]       [address] [place] [address] [address]  [place] [address]        Equal Access to Services     MISAEL WISDOM : Hadii aad ku hadasho Soomaali, waaxda luqadaha, qaybta kaalmada adeegyada, waxay genin hayledyn dina mejia . So [facility] [phone].    ATENCIÓN: Si habla español, tiene a pearce disposición servicios gratuitos de asistencia lingüística. Mily al [phone].    We comply with applicable federal civil rights laws and [place] laws. We do not discriminate on the basis of race, color, national origin, age, disability, sex, sexual orientation, or gender identity.            Thank you!     Thank you for choosing [facility]  for your care. Our goal is always to provide you with excellent care. Hearing back from our patients is one way we can continue to improve our services. Please take a few minutes to complete the written survey that you may receive in the mail after your visit with us. Thank you!             Your Updated Medication List - Protect others around you: Learn how to safely use, store and throw away your medicines at www.disposemymeds.org.          This list is accurate as of 18 11:59 PM.  Always use your most  recent med list.                   Brand Name Dispense Instructions for use Diagnosis    breast pump Misc     1 each    1 each as needed    Disorder of lactation       enoxaparin 120 MG/0.8ML injection    LOVENOX    60 Syringe    Inject 0.8 mLs (120 mg) Subcutaneous every 12 hours    Current use of long term anticoagulation, Homozygous Factor V Leiden mutation (H)       order for DME     1 each    Maternity Belt order    High-risk pregnancy in third trimester       PRENATAL VITAMIN PO           ranitidine 75 MG tablet    ZANTAC    30 tablet    Take 2 tablets (150 mg) by mouth 2 times daily    Heartburn       VITAMIN D (CHOLECALCIFEROL) PO      Take 5,000 Units by mouth daily

## 2018-05-31 NOTE — PROGRESS NOTES
37 year old female, , presents at 36 3/7 weeks for obstetric ultrasound assessment indicated by velamentous cord insertion, LGA.    Single fetus    Presentation cephalic    USEGA = 36 3/7 weeks.  EFW = 4783 grams, >99 % for 36 weeks. HC & AC = >99%.       PRAMOD = 18.1cm.  FHR = 170bpm     Placenta anter and grade anterior    Comments: LGA growth pattern with EFW likely to be >5000g at 40 weeks.    Findings discussed with patient.        RACHELL Orourke MD, FACOG

## 2018-05-31 NOTE — MR AVS SNAPSHOT
After Visit Summary   5/31/2018    Benita Olivo    MRN: [id number]           Patient Information     Date Of Birth          1980        Visit Information        Provider Department      5/31/2018 2:30 PM [facility] ULTRASOUND [facility]        Today's Diagnoses     Excessive fetal growth affecting management of pregnancy in third trimester, single or unspecified fetus    -  1    Velamentous insertion of umbilical cord, third trimester           Follow-ups after your visit        Your next 10 appointments already scheduled     Jun 05, 2018  8:15 AM CDT   RETURN OB with Doreen Fernandez MD   [facility] ([facility])    [facility] [facility] [address]  [address]  [address]  [facility] [address] [phone]              Future tests that were ordered for you today     Open Future Orders        Priority Expected Expires Ordered    CBC with platelets Routine  5/31/2019 5/31/2018    INR Routine  5/31/2019 5/31/2018    Partial thromboplastin time Routine  5/31/2019 5/31/2018    ABO/Rh Type and Screen Routine  5/31/2019 5/31/2018            Who to contact     Please call your clinic at [phone] to:    Ask questions about your health    Make or cancel appointments    Discuss your medicines    Learn about your test results    Speak to your doctor            Additional Information About Your Visit        BUILD Information     [employer] gives you secure access to your electronic health record. If you see a primary care provider, you can also send messages to your care team and make appointments. If you have questions, please call your primary care clinic.  If you do not have a primary care provider, please call [phone] and they will assist you.      BUILD is an electronic gateway that provides easy, online access to your medical records. With BUILD, you can request a clinic appointment, read your test results, renew a  prescription or communicate with your care team.     To access your existing account, please contact your [facility] or call [phone] for assistance.        Care EveryWhere ID     This is your Care EveryWhere ID. This could be used by other organizations to access your [place] medical records  [id number]        Your Vitals Were     Last Period                   09/18/2017            Blood Pressure from Last 3 Encounters:   05/17/18 105/71   04/30/18 109/71   04/23/18 125/78    Weight from Last 3 Encounters:   05/17/18 111.6 kg (246 lb)   04/30/18 111.7 kg (246 lb 3.2 oz)   04/23/18 110.8 kg (244 lb 3.2 oz)              We Performed the Following     Growth Ultrasound [id number]        Primary Care Provider Office Phone # Fax #    Reed Isacc Garcia [phone]       [address] [facility] [address]  [facility] [address]        Equal Access to Services     MISAEL WISDOM : Hadii aad ku hadasho Sojessica, waaxda luqadaha, qaybta kaalmada adeegyada, genevieve mejia . So [facility] [phone].    ATENCIÓN: Si habla español, tiene a pearce disposición servicios gratuitos de asistencia lingüística. Llame al [phone].    We comply with applicable federal civil rights laws and [place] laws. We do not discriminate on the basis of race, color, national origin, age, disability, sex, sexual orientation, or gender identity.            Thank you!     Thank you for choosing [facility]  for your care. Our goal is always to provide you with excellent care. Hearing back from our patients is one way we can continue to improve our services. Please take a few minutes to complete the written survey that you may receive in the mail after your visit with us. Thank you!             Your Updated Medication List - Protect others around you: Learn how to safely use, store and throw away your medicines at www.disposemymeds.org.          This list is accurate as  of 5/31/18  5:30 PM.  Always use your most recent med list.                   Brand Name Dispense Instructions for use Diagnosis    breast pump Misc     1 each    1 each as needed    Disorder of lactation       enoxaparin 120 MG/0.8ML injection    LOVENOX    60 Syringe    Inject 0.8 mLs (120 mg) Subcutaneous every 12 hours    Current use of long term anticoagulation, Homozygous Factor V Leiden mutation (H)       order for DME     1 each    Maternity Belt order    High-risk pregnancy in third trimester       PRENATAL VITAMIN PO           ranitidine 75 MG tablet    ZANTAC    30 tablet    Take 2 tablets (150 mg) by mouth 2 times daily    Heartburn       VITAMIN D (CHOLECALCIFEROL) PO      Take 5,000 Units by mouth daily

## 2018-05-31 NOTE — LETTER
2018     RE: Benita Olivo  [address] [place] [address]     Dear Colleague,    Thank you for referring your patient, Benita Olivo, to the [facility] at [facility]. Please see a copy of my visit note below.    37 year old female, , presents at 36 3/7 weeks for obstetric ultrasound assessment indicated by velamentous cord insertion, LGA.    Single fetus    Presentation cephalic    USEGA = 36 3/7 weeks.  EFW = 4783 grams, >99 % for 36 weeks. HC & AC = >99%.       PRAMOD = 18.1cm.  FHR = 170bpm     Placenta anter and grade anterior    Comments: LGA growth pattern with EFW likely to be >5000g at 40 weeks.    Findings discussed with patient.      RACHELL Orourke MD, FACOG

## 2018-05-31 NOTE — PROGRESS NOTES
Subjective:      37 year old  at 36w3d complicated by factor V leiden on lovenox who presents for a routine prenatal appointment.         No vaginal bleeding,  leakage of fluid, or change in vaginal discharge.  No contractions.  Normal fetal movement.     No HA, visual changes, RUQ or epigastric pain.   Patient concerns: STD paperwork. Still taking Lovenox, no symptoms of leg swelling or pain or SOB. Feeling well overall.     Objective:   See OB flowsheet    Assessment/Plan  #Prenatal Care  -Rh neg, Ab neg - s/p Rhogam   -GBS done today    #Factor V Leiden  -Continuing lovenox  -discussed holding 24hr before c/s and resuming 12-24hr after c/s    #LGA  -Discussed risks of vaginal birth with a baby currently measuring >4700g, would be projected to be >5000g at 39 wks. Discussed risks and benefits of a c/s, including the c/s process. Patient agreed with plan for c/s, but was tearful. Advised returning with further questions and discussion on next visit.      GBS screening: Obtained.  Labor signs discussed. Reinforced daily fetal movement counts.  Reviewed why/how to contact provider if headache/visual changes/RUQ or epigastric pain, decreased fetal movement, vaginal bleeding, leakage of fluid.   Return to clinic in 1 week and prn if questions or concerns.     I, Abby Oliva, am serving as a scribe; to document services personally performed by  Dr. Fernandez based on data collection and the provider's statements to me.     Abby Oliva  I, Dr. Fernandez, personally performed the services described in this documentation, as scribed by Abby Campbell in my presence, and it is both accurate and complete.   Doreen Fernandez MD

## 2018-06-01 ENCOUNTER — TELEPHONE (OUTPATIENT)
Dept: UROLOGY | Facility: CLINIC | Age: 38
End: 2018-06-01

## 2018-06-01 LAB
GP B STREP DNA SPEC QL NAA+PROBE: NEGATIVE
SPECIMEN SOURCE: NORMAL

## 2018-06-04 PROBLEM — O36.63X0 EXCESSIVE FETAL GROWTH AFFECTING MANAGEMENT OF PREGNANCY IN THIRD TRIMESTER: Status: ACTIVE | Noted: 2018-06-04

## 2018-06-05 ENCOUNTER — OFFICE VISIT (OUTPATIENT)
Dept: OBGYN | Facility: CLINIC | Age: 38
End: 2018-06-05
Attending: OBSTETRICS & GYNECOLOGY
Payer: COMMERCIAL

## 2018-06-05 VITALS
DIASTOLIC BLOOD PRESSURE: 74 MMHG | WEIGHT: 244 LBS | HEIGHT: 69 IN | SYSTOLIC BLOOD PRESSURE: 108 MMHG | HEART RATE: 88 BPM | BODY MASS INDEX: 36.14 KG/M2

## 2018-06-05 DIAGNOSIS — O09.93 HIGH-RISK PREGNANCY IN THIRD TRIMESTER: Primary | ICD-10-CM

## 2018-06-05 PROCEDURE — G0463 HOSPITAL OUTPT CLINIC VISIT: HCPCS | Mod: ZF

## 2018-06-05 NOTE — LETTER
2018       RE: Benita Olivo  [address], [place]. \Hospital [facility] a New Name and Outlook.\"" [id number]     Dear Colleague,    Thank you for referring your patient, Benita Olivo, to the [facility] at [facility]. Please see a copy of my visit note below.    38 yo  here at 37+1 for JEROME. Discussed etails of  delivery on Mon . Pt became tearful at thought of meeting her daughter. NPO holding Lovenox discussed.  O see flow sheet    A/P IUP at 37+1  1) planned c/s for LGA  2)H/O of DVT x2 On LOvenox will transition to Coumadin pp.  3) Remote h/o AIS s/p LEEP nl paps since    Maria Luisa Marie MD

## 2018-06-05 NOTE — MR AVS SNAPSHOT
After Visit Summary   6/5/2018    Benita Olivo    MRN: [id number]           Patient Information     Date Of Birth          1980        Visit Information        Provider Department      6/5/2018 4:00 PM Maria Luisa Marie MD [facility]        Today's Diagnoses     High-risk pregnancy in third trimester    -  1       Follow-ups after your visit        Your next 10 appointments already scheduled     Jun 11, 2018  8:45 AM CDT   RETURN OB with Olga Clemente MD   [facility] ([facility])    [facility]  [address]  [address] [facility] [address] [phone]            Jun 18, 2018   Procedure with Junie Hightower MD   [facility] (--)    [address] [facility] [address] [phone]              Who to contact     Please call your clinic at [phone] to:    Ask questions about your health    Make or cancel appointments    Discuss your medicines    Learn about your test results    Speak to your doctor            Additional Information About Your Visit        MyChart Information     Appydrink gives you secure access to your electronic health record. If you see a primary care provider, you can also send messages to your care team and make appointments. If you have questions, please call your primary care clinic.  If you do not have a primary care provider, please call [phone] and they will assist you.      Appydrink is an electronic gateway that provides easy, online access to your medical records. With Appydrink, you can request a clinic appointment, read your test results, renew a prescription or communicate with your care team.     To access your existing account, please contact your [facility] or call [phone] for assistance.        Care EveryWhere ID     This is your Care EveryWhere ID. This could be used by other organizations to access your  "[place] medical records  [id number]        Your Vitals Were     Pulse Height Last Period BMI (Body Mass Index)          88 1.753 m (5' 9\") 09/18/2017 36.03 kg/m2         Blood Pressure from Last 3 Encounters:   06/05/18 108/74   05/17/18 105/71   04/30/18 109/71    Weight from Last 3 Encounters:   06/05/18 110.7 kg (244 lb)   05/17/18 111.6 kg (246 lb)   04/30/18 111.7 kg (246 lb 3.2 oz)              Today, you had the following     No orders found for display       Primary Care Provider Office Phone # Fax #    Reed Isacc Garcia [phone]       [address] [place] [address]        Equal Access to Services     MISAEL WISDOM : Hadcarlos Mcconnell, waharvey luqadaha, qaybta kaalmada alexandra, genevieve mejia . So [facility] [phone].    ATENCIÓN: Si habla español, tiene a pearce disposición servicios gratuitos de asistencia lingüística. Mily al [phone].    We comply with applicable federal civil rights laws and [place] laws. We do not discriminate on the basis of race, color, national origin, age, disability, sex, sexual orientation, or gender identity.            Thank you!     Thank you for choosing [facility]  for your care. Our goal is always to provide you with excellent care. Hearing back from our patients is one way we can continue to improve our services. Please take a few minutes to complete the written survey that you may receive in the mail after your visit with us. Thank you!             Your Updated Medication List - Protect others around you: Learn how to safely use, store and throw away your medicines at www.disposemymeds.org.          This list is accurate as of 6/5/18  4:31 PM.  Always use your most recent med list.                   Brand Name Dispense Instructions for use Diagnosis    breast pump Misc     1 each    1 each as needed    Disorder of lactation       enoxaparin 120 MG/0.8ML injection    " LOVENOX    60 Syringe    Inject 0.8 mLs (120 mg) Subcutaneous every 12 hours    Current use of long term anticoagulation, Homozygous Factor V Leiden mutation (H)       order for DME     1 each    Maternity Belt order    High-risk pregnancy in third trimester       PRENATAL VITAMIN PO           ranitidine 75 MG tablet    ZANTAC    30 tablet    Take 2 tablets (150 mg) by mouth 2 times daily    Heartburn       VITAMIN D (CHOLECALCIFEROL) PO      Take 5,000 Units by mouth daily

## 2018-06-05 NOTE — PROGRESS NOTES
38 yo  here at 37+1 for JEROME. Discussed etails of  delivery on Mon . Pt became tearful at thought of meeting her daughter. NPO holding Lovenox discussed.  O see flow sheet    A/P IUP at 37+1  1) planned c/s for LGA  2)H/O of DVT x2 On LOvenox will transition to Coumadin pp.  3) Remote h/o AIS s/p LEEP nl paps since    Maria Luisa Marie MD

## 2018-06-11 ENCOUNTER — OFFICE VISIT (OUTPATIENT)
Dept: OBGYN | Facility: CLINIC | Age: 38
End: 2018-06-11
Attending: OBSTETRICS & GYNECOLOGY
Payer: COMMERCIAL

## 2018-06-11 VITALS
WEIGHT: 245.7 LBS | DIASTOLIC BLOOD PRESSURE: 73 MMHG | SYSTOLIC BLOOD PRESSURE: 105 MMHG | HEIGHT: 69 IN | HEART RATE: 114 BPM | BODY MASS INDEX: 36.39 KG/M2

## 2018-06-11 DIAGNOSIS — D68.51 HOMOZYGOUS FACTOR V LEIDEN MUTATION (H): ICD-10-CM

## 2018-06-11 DIAGNOSIS — O36.63X0 EXCESSIVE FETAL GROWTH AFFECTING MANAGEMENT OF PREGNANCY IN THIRD TRIMESTER, SINGLE OR UNSPECIFIED FETUS: ICD-10-CM

## 2018-06-11 DIAGNOSIS — Z86.711 HISTORY OF PULMONARY EMBOLISM: ICD-10-CM

## 2018-06-11 DIAGNOSIS — O09.893 SUPERVISION OF OTHER HIGH RISK PREGNANCIES, THIRD TRIMESTER: Primary | ICD-10-CM

## 2018-06-11 PROCEDURE — G0463 HOSPITAL OUTPT CLINIC VISIT: HCPCS | Mod: ZF

## 2018-06-11 NOTE — MR AVS SNAPSHOT
After Visit Summary   6/11/2018    Benita Olivo    MRN: [id number]           Patient Information     Date Of Birth          1980        Visit Information        Provider Department      6/11/2018 8:45 AM Olga Clemente MD [facility]        Today's Diagnoses     Supervision of other high risk pregnancies, third trimester    -  1    History of pulmonary embolism        Homozygous Factor V Leiden mutation (H)        Excessive fetal growth affecting management of pregnancy in third trimester, single or unspecified fetus           Follow-ups after your visit        Follow-up notes from your care team     Return in about 7 weeks (around 7/30/2018).      Your next 10 appointments already scheduled     Jun 18, 2018   Procedure with Junie Hightower MD   [facility] (--)    [address] [facility] [address] [phone]              Who to contact     Please call your clinic at [phone] to:    Ask questions about your health    Make or cancel appointments    Discuss your medicines    Learn about your test results    Speak to your doctor            Additional Information About Your Visit        MyChart Information     NEB[employer]RADE gives you secure access to your electronic health record. If you see a primary care provider, you can also send messages to your care team and make appointments. If you have questions, please call your primary care clinic.  If you do not have a primary care provider, please call [phone] and they will assist you.      NEBOTRADE is an electronic gateway that provides easy, online access to your medical records. With NEB[employer]RADE, you can request a clinic appointment, read your test results, renew a prescription or communicate with your care team.     To access your existing account, please contact your [facility] or call [phone] for assistance.        Care EveryWhere ID     This is your Care EveryWhere  "ID. This could be used by other organizations to access your [place] medical records  [id number]        Your Vitals Were     Pulse Height Last Period BMI (Body Mass Index)          114 1.753 m (5' 9\") 09/18/2017 36.28 kg/m2         Blood Pressure from Last 3 Encounters:   06/11/18 105/73   06/05/18 108/74   05/17/18 105/71    Weight from Last 3 Encounters:   06/11/18 111.4 kg (245 lb 11.2 oz)   06/05/18 110.7 kg (244 lb)   05/17/18 111.6 kg (246 lb)              Today, you had the following     No orders found for display       Primary Care Provider Office Phone # Fax #    Reed Garcia [phone]       [address] [place] [address]        Equal Access to Services     MISAEL WISDOM : Hadii aad ku hadasho Sojessica, waaxda luqadaha, qaybta kaalmada dinayajackie, genevieve mejia . So [facility] [phone].    ATENCIÓN: Si habla español, tiene a pearce disposición servicios gratuitos de asistencia lingüística. Llame al [phone].    We comply with applicable federal civil rights laws and [place] laws. We do not discriminate on the basis of race, color, national origin, age, disability, sex, sexual orientation, or gender identity.            Thank you!     Thank you for choosing [facility]  for your care. Our goal is always to provide you with excellent care. Hearing back from our patients is one way we can continue to improve our services. Please take a few minutes to complete the written survey that you may receive in the mail after your visit with us. Thank you!             Your Updated Medication List - Protect others around you: Learn how to safely use, store and throw away your medicines at www.disposemymeds.org.          This list is accurate as of 6/11/18  4:01 PM.  Always use your most recent med list.                   Brand Name Dispense Instructions for use Diagnosis    breast pump Misc     1 each    1 each as needed    " Disorder of lactation       enoxaparin 120 MG/0.8ML injection    LOVENOX    60 Syringe    Inject 0.8 mLs (120 mg) Subcutaneous every 12 hours    Current use of long term anticoagulation, Homozygous Factor V Leiden mutation (H)       order for DME     1 each    Maternity Belt order    High-risk pregnancy in third trimester       PRENATAL VITAMIN PO           ranitidine 75 MG tablet    ZANTAC    30 tablet    Take 2 tablets (150 mg) by mouth 2 times daily    Heartburn       VITAMIN D (CHOLECALCIFEROL) PO      Take 5,000 Units by mouth daily

## 2018-06-11 NOTE — NURSING NOTE
Chief Complaint   Patient presents with     Prenatal Care     38w0d       See ALEX Flowers 6/11/2018

## 2018-06-11 NOTE — LETTER
2018       RE: Benita Olivo  [address], [place]. \Hospital Has a New Name and Outlook.\"" [address]     Dear Colleague,    Thank you for referring your patient, Benita Olivo, to the [facility] at [facility]. Please see a copy of my visit note below.    S:  Overall doing well, getting uncomfortable, some irregular cramping, good FM.  Declines cx check today.    O: see flow    A:  38 y/o  at 38+0 weeks, doing well.  Pregnancy complicated by h/o DVT on Lovenox and LGA fetus.    P:  Her c/s is scheduled for next Monday.  She will have her labs drawn on Friday.  Will need repeat INR the morning of surgery.  She really has no other questions about the c/s today.  Labor precautions reviewed-hold next dose of Lovenox if having regular contractions that she thinks could be early labor until evaluated or they go away.      Olga Clemente MD, FACOG

## 2018-06-11 NOTE — PROGRESS NOTES
S:  Overall doing well, getting uncomfortable, some irregular cramping, good FM.  Declines cx check today.    O: see flow    A:  38 y/o  at 38+0 weeks, doing well.  Pregnancy complicated by h/o DVT on Lovenox and LGA fetus.    P:  Her c/s is scheduled for next Monday.  She will have her labs drawn on Friday.  Will need repeat INR the morning of surgery.  She really has no other questions about the c/s today.  Labor precautions reviewed-hold next dose of Lovenox if having regular contractions that she thinks could be early labor until evaluated or they go away.      Olga Clemente MD, FACOG

## 2018-06-12 ENCOUNTER — ANESTHESIA EVENT (OUTPATIENT)
Dept: OBGYN | Facility: CLINIC | Age: 38
End: 2018-06-12
Payer: COMMERCIAL

## 2018-06-15 ENCOUNTER — ANTICOAGULATION THERAPY VISIT (OUTPATIENT)
Dept: ANTICOAGULATION | Facility: CLINIC | Age: 38
End: 2018-06-15

## 2018-06-15 DIAGNOSIS — D68.51 HOMOZYGOUS FACTOR V LEIDEN MUTATION (H): ICD-10-CM

## 2018-06-15 DIAGNOSIS — I82.409 DEEP VEIN THROMBOSIS (DVT) (H): ICD-10-CM

## 2018-06-15 DIAGNOSIS — O09.93 HIGH-RISK PREGNANCY IN THIRD TRIMESTER: ICD-10-CM

## 2018-06-15 DIAGNOSIS — Z79.01 LONG-TERM (CURRENT) USE OF ANTICOAGULANTS: ICD-10-CM

## 2018-06-15 LAB
ABO + RH BLD: NORMAL
ABO + RH BLD: NORMAL
APTT PPP: 31 SEC (ref 22–37)
BLD GP AB SCN SERPL QL: NORMAL
BLOOD BANK CMNT PATIENT-IMP: NORMAL
ERYTHROCYTE [DISTWIDTH] IN BLOOD BY AUTOMATED COUNT: 14 % (ref 10–15)
HCT VFR BLD AUTO: 39.5 % (ref 35–47)
HGB BLD-MCNC: 13.2 G/DL (ref 11.7–15.7)
INR PPP: 0.98 (ref 0.86–1.14)
MCH RBC QN AUTO: 31.4 PG (ref 26.5–33)
MCHC RBC AUTO-ENTMCNC: 33.4 G/DL (ref 31.5–36.5)
MCV RBC AUTO: 94 FL (ref 78–100)
PLATELET # BLD AUTO: 173 10E9/L (ref 150–450)
RBC # BLD AUTO: 4.21 10E12/L (ref 3.8–5.2)
SPECIMEN EXP DATE BLD: NORMAL
WBC # BLD AUTO: 13.4 10E9/L (ref 4–11)

## 2018-06-15 PROCEDURE — 86900 BLOOD TYPING SEROLOGIC ABO: CPT | Performed by: OBSTETRICS & GYNECOLOGY

## 2018-06-15 PROCEDURE — 85730 THROMBOPLASTIN TIME PARTIAL: CPT | Performed by: OBSTETRICS & GYNECOLOGY

## 2018-06-15 PROCEDURE — 86901 BLOOD TYPING SEROLOGIC RH(D): CPT | Performed by: OBSTETRICS & GYNECOLOGY

## 2018-06-15 PROCEDURE — 85610 PROTHROMBIN TIME: CPT | Performed by: OBSTETRICS & GYNECOLOGY

## 2018-06-15 PROCEDURE — 85027 COMPLETE CBC AUTOMATED: CPT | Performed by: OBSTETRICS & GYNECOLOGY

## 2018-06-15 PROCEDURE — 36415 COLL VENOUS BLD VENIPUNCTURE: CPT | Performed by: OBSTETRICS & GYNECOLOGY

## 2018-06-15 PROCEDURE — 86850 RBC ANTIBODY SCREEN: CPT | Performed by: OBSTETRICS & GYNECOLOGY

## 2018-06-15 NOTE — PROGRESS NOTES
S:  Overall doing well, getting uncomfortable, some irregular cramping, good FM.  Declines cx check today.     O: see flow     A:  36 y/o  at 38+0 weeks, doing well.  Pregnancy complicated by h/o DVT on Lovenox and LGA fetus.     P:  Her c/s is scheduled for next Monday.  She will have her labs drawn on Friday.  Will need repeat INR the morning of surgery.  She really has no other questions about the c/s today.  Labor precautions reviewed-hold next dose of Lovenox if having regular contractions that she thinks could be early labor until evaluated or they go away.       Olga Clemente MD, FACOG

## 2018-06-15 NOTE — ANESTHESIA PREPROCEDURE EVALUATION
Anesthesia Evaluation     .             ROS/MED HX    ENT/Pulmonary:       Neurologic:       Cardiovascular:         METS/Exercise Tolerance:     Hematologic:     (+) History of blood clots (HOMOZYGOUS factor V Leiden with recurrent unprovoked DVT ()/PE's ().  On lovenox 120mg BID, last dose 8pm on Saturday ~ 36 hours ago.) pt is anticoagulated, -      Musculoskeletal:         GI/Hepatic:         Renal/Genitourinary:         Endo:         Psychiatric:         Infectious Disease:         Malignancy:   (+)   H/o cervical adenocarcinoma in situ s/p CKC with neg margins         Other: Comment:  - LGA growth pattern with EFW likely to be >5000g at 40 weeks.                    Physical Exam  Normal systems: cardiovascular, pulmonary and dental    Airway   Mallampati: III  TM distance: >3 FB  Neck ROM: full    Dental     Cardiovascular   Rhythm and rate: regular and normal      Pulmonary    breath sounds clear to auscultation             HPI: Benita Olivo is a  37 year old female  now with an IUP at 39w0d complicated by homozygous factor V leiden and LGA fetus.  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 Last dose of lovenox ~36 hours ago - Sat night at 8 pm.    History and physical reviewed; no interval change.    Procedure: Procedure(s):  Primary  Section - Wound Class: II-Clean Contaminated     NPO Status:   Adequate. > 6 hours.     PMHx/PSHx/ROS:  Past Medical History:   Diagnosis Date     Abnormal Pap smear of cervix     conization Dr. Fernandez.  f/u , 2014paps normal      ASCUS on Pap smear 2012    + hpv,     DVT of upper extremity (deep vein thrombosis) (H)     right arm      Factor V deficiency (H)     homozygous      Mass 2011    URETHRAl mass      PE (pulmonary embolism)     was on birth control at time, hosp x 4-5 d        Past Surgical History:   Procedure Laterality Date     APPENDECTOMY OPEN CHILD       CONIZATION  2012    Procedure:  CONIZATION;  Cold Knife [facility]   Choice anesthesia;  Surgeon: Doreen Fernandez MD;  Location: [facility] OR     CYSTOSCOPY, EXCISE URETHRAL CARUNCLE, COMBINED  7/24/2012    Procedure: COMBINED CYSTOSCOPY, EXCISE URETHRAL CARUNCLE;  Cystoscopy and Excision of Urethral Mass;  Surgeon: Rasheeda Salazar MD;  Location: [facility] OR     wisdom teeth              Anesthesia Plan      History & Physical Review      ASA Status:  3 .        Plan for Spinal   PONV prophylaxis:  Ondansetron (or other 5HT-3)       Postoperative Care  Postoperative pain management:  Neuraxial analgesia and Peripheral nerve block (Single Shot).      Consents  Anesthetic plan, risks, benefits and alternatives discussed with:  Patient..

## 2018-06-15 NOTE — MR AVS SNAPSHOT
Benita Olivo   6/15/2018   Anticoagulation Therapy Visit    Description:  37 year old female   Provider:  Efren Wade, [facility], located within [facility]   Department:  [facility]           INR as of 6/15/2018     Today's INR 0.98!      Anticoagulation Summary as of 6/15/2018     INR goal 2.0-3.0   Today's INR 0.98!   Full warfarin instructions No maintenance plan   Next INR check 6/18/2018    Indications   Deep vein thrombosis (DVT) (H) [I82.409] [I82.409]  Long-term (current) use of anticoagulants [Z79.01] [Z79.01]  Homozygous Factor V Leiden mutation (H) [D68.51]         June 2018 Details    Sun Mon Tue Wed Thu Fri Sat          1               2                 3               4               5               6               7               8               9                 10               11               12               13               14               15         See details      16                 17               18            19               20               21               22               23                 24               25               26               27               28               29               30                Date Details   06/15 This INR check       Date of next INR:  6/18/2018

## 2018-06-17 ENCOUNTER — HOSPITAL ENCOUNTER (OUTPATIENT)
Facility: CLINIC | Age: 38
Discharge: HOME | End: 2018-06-17
Attending: OBSTETRICS & GYNECOLOGY | Admitting: OBSTETRICS & GYNECOLOGY
Payer: COMMERCIAL

## 2018-06-17 ENCOUNTER — TELEPHONE (OUTPATIENT)
Dept: OBGYN | Facility: CLINIC | Age: 38
End: 2018-06-17

## 2018-06-17 VITALS — TEMPERATURE: 98.1 F | OXYGEN SATURATION: 96 % | HEART RATE: 112 BPM

## 2018-06-17 PROBLEM — Z36.89 ENCOUNTER FOR TRIAGE IN PREGNANT PATIENT: Status: ACTIVE | Noted: 2018-06-17

## 2018-06-17 NOTE — IP AVS SNAPSHOT
[facility]    [address]    [facility] [place] [address]    Phone:  [phone]                                       After Visit Summary   6/17/2018    Benita Olivo    MRN: [id number]           After Visit Summary Signature Page     I have received my discharge instructions, and my questions have been answered. I have discussed any challenges I see with this plan with the nurse or doctor.    ..........................................................................................................................................  Patient/Patient Representative Signature      ..........................................................................................................................................  Patient Representative Print Name and Relationship to Patient    ..................................................               ................................................  Date                                            Time    ..........................................................................................................................................  Reviewed by Signature/Title    ...................................................              ..............................................  Date                                                            Time

## 2018-06-17 NOTE — PLAN OF CARE
Data: Patient presented to [facility] at 0910.   Reason for maternal/fetal assessment per patient is Rule Out Labor  .  Patient is a . Prenatal record reviewed.      Obstetric History       T1      L1     SAB0   TAB0   Ectopic0   Multiple0   Live Births1       # Outcome Date GA Lbr Jorge/2nd Weight Sex Delivery Anes PTL Lv   2 Current            1 Term 16 39w6d / 02:57 3.98 kg (8 lb 12.4 oz) M Vag-Spont EPI  JAZ      Apgar1:  9                Apgar5: 9      Obstetric Comments   Induced d/t anticoagulant use. + PPH. Denies GDM, HTN, PPD.   . Medical history:   Past Medical History:   Diagnosis Date     Abnormal Pap smear of cervix     conization Dr. Fernandez.  f/u , paps normal      ASCUS on Pap smear 2012    + hpv,     DVT of upper extremity (deep vein thrombosis) (H)     right arm      Factor V deficiency (H)     homozygous      Mass     URETHRAl mass      PE (pulmonary embolism)     was on birth control at time, hosp x 4-5 d    . Gestational Age 38w6d. VSS. Fetal movement present. Patient woke up with cramping and contractions at 0230, irregular, 5-10 min apart, she has trace of lower extrem edema.  She denies  backache, vaginal discharge, pelvic pressure, UTI symptoms,  bloody show, vaginal bleeding, headache, visual disturbances, epigastric or URQ pain, abdominal pain, rupture of membranes. Supportive  Rigo present.  Action: Verbal consent for EFM. Triage assessment completed. EFM applied for routine monitoring. Uterine assessment irritability and irreg contractions 6-9 min. apart.   Response: Dr. Sharp informed of admit to triage. Plan per provider is . Patient verbalized agreement with plan. Pt oriented to room and call light.

## 2018-06-17 NOTE — TELEPHONE ENCOUNTER
TC from patient with contractions all night.  who is lilliam for primary  on Monday for LGA. Delivered first baby 39+6 at 3.98 kg. Recommend assessment in triage.   Shawna Pavon

## 2018-06-17 NOTE — PROGRESS NOTES
[facility]  OB Triage Note    CC: Contractions    HPI: Ms. Benita Olivo is a 37 year old  at 38w6d by LMP c/w 8w3d US, who presents with contractions since 0200. Feels contractions have decreased slightly since 0700, and feels them 3-4 times in one hour. No constant abdominal pain. She denies leaking fluid, vaginal bleeding.  + Good fetal movement. Had episode of diarrhea last night. No pain with BM. No blood in stool. Denies HA, vision changes, SOB, chest pain, fevers, chils. Cervix has not been checked in pregnancy before.     She has history of DVT and is on Lovenox 100mg BID. She last took her dose at 2030. She last ate at 2100.     Obstetric Complications  1. FVL homozygote, hx of DVTx2 last in , on Lovenox 120mg BID   2. LGA, EFW 4783g at 36w3d  3. Velamentous cord insertion   4. Hx of AIS, normal paps since   5. AMA  6. Rh negative, s/p rhogam     Objective:  Patient Vitals for the past 24 hrs:   Temp Temp src Pulse SpO2   18 0920 98.1  F (36.7  C) Oral 112 96 %     Gen: Well-appearing, NAD  CV: RRR, no murmurs  Pulm: CTAB, no wheezes  Abd: Soft, gravid  Ext: Trace LE edema b/l, symmetric calves, no calf tenderness     Cervix: 3 cm/60/-3    FHT: BL 150bpm, moderate abril, + accels, no decels  [place]: Ctx q 6-12 minutes, spacing over time in triage      Assesment/Plan:  Ms. Benita Olivo is a 37 year old  at 38w6d by LMP c/w 8w3d US here with contractions. Contractions decreased in frequency while being observed in triage. Will plan for scheduled CS tomorrow as planned. Discussed NPO status, holding Lovenox today and this evening in anticipation of procedure tomorrow. Return precautions discussed.     Fetal Well Being   - Category I FHT. Reactive and reassuring      Tiffany Sharp MD, MHS  Ob/Gyn PGY3  18 10:51 AM    I personally examined and evaluated Benita Olivo on 2018.  I discussed the patient with Dr. Sharp and agree with the  presentation, exam and plan of care documented in this note with edits by me.   Aurora Dong MD MPH

## 2018-06-17 NOTE — DISCHARGE INSTRUCTIONS
Discharge Instruction for Undelivered Patients      You were seen for: Labor Assessment  We Consulted: Dr Limon  You had (Test or Medicine):Exam and Monitoring     Diet:   Drink 8 to 12 glasses of liquids (milk, juice, water) every day.  You may eat meals and snacks.     Activity:  Count fetal kicks everyday (see handout)  Call your doctor or nurse midwife if your baby is moving less than usual.     Call your provider if you notice:  Swelling in your face or increased swelling in your hands or legs.  Headaches that are not relieved by Tylenol (acetaminophen).  Changes in your vision (blurring: seeing spots or stars.)  Nausea (sick to your stomach) and vomiting (throwing up).   Weight gain of 5 pounds or more per week.  Heartburn that doesn't go away.  Signs of bladder infection: pain when you urinate (use the toilet), need to go more often and more urgently.  The bag of baeza (rupture of membranes) breaks, or you notice leaking in your underwear.  Bright red blood in your underwear.  Abdominal (lower belly) or stomach pain.  For first baby: Contractions (tightening) less than 5 minutes apart for one hour or more.  Second (plus) baby: Contractions (tightening) less than 10 minutes apart and getting stronger.  *If less than 34 weeks: Contractions (tightenings) more than 6 times in one hour.  Increase or change in vaginal discharge (note the color and amount)  Other: follow pre-op orders for C/S tomorrow am.    Follow-up:  Scheduled C/S 6/18/18

## 2018-06-17 NOTE — IP AVS SNAPSHOT
MRN:[id number]                      After Visit Summary   6/17/2018    Benita Olivo    MRN: [id number]           Thank you!     Thank you for choosing [facility] for your care. Our goal is always to provide you with excellent care. Hearing back from our patients is one way we can continue to improve our services. Please take a few minutes to complete the written survey that you may receive in the mail after you visit with us. Thank you!        Patient Information     Date Of Birth          1980        Designated Caregiver       Most Recent Value    Caregiver    Will someone help with your care after discharge? no      About your hospital stay     You were admitted on:  June 17, 2018 You last received care in the:  [facility]    You were discharged on:  June 17, 2018       Who to Call     For medical emergencies, please call 911.  For non-urgent questions about your medical care, please call your primary care provider or clinic, None          Attending Provider     Provider Specialty    Aurora Brown MD OB/Gyn       Primary Care Provider Fax #    Physician No Ref-Primary [phone]      Your next 10 appointments already scheduled     Jun 18, 2018   Procedure with Junie Hightower MD   [facility] (--)    [address] [facility] [address] [phone]              Further instructions from your care team       Discharge Instruction for Undelivered Patients      You were seen for: Labor Assessment  We Consulted: Dr Limon  You had (Test or Medicine):Exam and Monitoring     Diet:   Drink 8 to 12 glasses of liquids (milk, juice, water) every day.  You may eat meals and snacks.     Activity:  Count fetal kicks everyday (see handout)  Call your doctor or nurse midwife if your baby is moving less than usual.     Call your provider if you notice:  Swelling in your face or increased swelling in your hands or legs.  Headaches that are not relieved by Tylenol  (acetaminophen).  Changes in your vision (blurring: seeing spots or stars.)  Nausea (sick to your stomach) and vomiting (throwing up).   Weight gain of 5 pounds or more per week.  Heartburn that doesn't go away.  Signs of bladder infection: pain when you urinate (use the toilet), need to go more often and more urgently.  The bag of baeza (rupture of membranes) breaks, or you notice leaking in your underwear.  Bright red blood in your underwear.  Abdominal (lower belly) or stomach pain.  For first baby: Contractions (tightening) less than 5 minutes apart for one hour or more.  Second (plus) baby: Contractions (tightening) less than 10 minutes apart and getting stronger.  *If less than 34 weeks: Contractions (tightenings) more than 6 times in one hour.  Increase or change in vaginal discharge (note the color and amount)  Other: follow pre-op orders for C/S tomorrow am.    Follow-up:  Scheduled C/S 6/18/18          Pending Results     No orders found from 6/15/2018 to 6/18/2018.            Statement of Approval     Ordered          06/17/18 1207  I have reviewed and agree with all the recommendations and orders detailed in this document.  EFFECTIVE NOW     Approved and electronically signed by:  Aurora Brown MD             Admission Information     Date & Time Provider Department Dept. Phone    6/17/2018 Aurora Brown MD [facility] [phone]      Your Vitals Were     Pulse Temperature Last Period Pulse Oximetry          112 98.1  F (36.7  C) (Oral) 09/18/2017 96%        MyChart Information     [email or web address] gives you secure access to your electronic health record. If you see a primary care provider, you can also send messages to your care team and make appointments. If you have questions, please call your primary care clinic.  If you do not have a primary care provider, please call [phone] and they will assist you.        Care EveryWhere ID     This is your Care EveryWhere ID. This  could be used by other organizations to access your [facility] medical records  [id number]        Equal Access to Services     GERALDINEJOSE ARTIS : Hadii alda Mcconnell, wamateusda aaliyah, qasalvadorta kagunnerjackie arroyoserenajackie, genevieve brownjoseisreal patel. So [facility] [phone].    ATENCIÓN: Si habla español, tiene a pearce disposición servicios gratuitos de asistencia lingüística. Llame al [phone].    We comply with applicable federal civil rights laws and [place] laws. We do not discriminate on the basis of race, color, national origin, age, disability, sex, sexual orientation, or gender identity.               Review of your medicines      UNREVIEWED medicines. Ask your doctor about these medicines        Dose / Directions    enoxaparin 120 MG/0.8ML injection   Commonly known as:  LOVENOX   Used for:  Current use of long term anticoagulation, Homozygous Factor V Leiden mutation (H)        Dose:  120 mg   Inject 0.8 mLs (120 mg) Subcutaneous every 12 hours   Quantity:  60 Syringe   Refills:  4       PRENATAL VITAMIN PO        Refills:  0       ranitidine 75 MG tablet   Commonly known as:  ZANTAC   Used for:  Heartburn        Dose:  150 mg   Take 2 tablets (150 mg) by mouth 2 times daily   Quantity:  30 tablet   Refills:  3       VITAMIN D (CHOLECALCIFEROL) PO        Dose:  5000 Units   Take 5,000 Units by mouth daily   Refills:  0         CONTINUE these medicines which have NOT CHANGED        Dose / Directions    breast pump Misc   Used for:  Disorder of lactation        Dose:  1 each   1 each as needed   Quantity:  1 each   Refills:  0       order for DME   Used for:  High-risk pregnancy in third trimester        Maternity Belt order   Quantity:  1 each   Refills:  0                Protect others around you: Learn how to safely use, store and throw away your medicines at www.disposemymeds.org.             Medication List: This is a list of all your medications and when to take them. Check marks below  indicate your daily home schedule. Keep this list as a reference.      Medications           Morning Afternoon Evening Bedtime As Needed    breast pump Misc   1 each as needed                                enoxaparin 120 MG/0.8ML injection   Commonly known as:  LOVENOX   Inject 0.8 mLs (120 mg) Subcutaneous every 12 hours                                order for DME   Maternity Belt order                                PRENATAL VITAMIN PO                                ranitidine 75 MG tablet   Commonly known as:  ZANTAC   Take 2 tablets (150 mg) by mouth 2 times daily                                VITAMIN D (CHOLECALCIFEROL) PO   Take 5,000 Units by mouth daily

## 2018-06-17 NOTE — PROGRESS NOTES
Pt describes cramps as mild gas pain, Dr Limon discharged pt to home, return for C/S tomorrow as scheduled. Pt agrees with plan, routine dc undelivered instructions reviewed. Pt verbalized understanding. Home with .

## 2018-06-18 ENCOUNTER — SURGERY (OUTPATIENT)
Age: 38
End: 2018-06-18

## 2018-06-18 ENCOUNTER — HOSPITAL ENCOUNTER (INPATIENT)
Facility: CLINIC | Age: 38
LOS: 3 days | Discharge: HOME HEALTH | End: 2018-06-21
Attending: OBSTETRICS & GYNECOLOGY | Admitting: OBSTETRICS & GYNECOLOGY
Payer: COMMERCIAL

## 2018-06-18 ENCOUNTER — ANESTHESIA (OUTPATIENT)
Dept: OBGYN | Facility: CLINIC | Age: 38
End: 2018-06-18
Payer: COMMERCIAL

## 2018-06-18 DIAGNOSIS — K59.03 DRUG-INDUCED CONSTIPATION: ICD-10-CM

## 2018-06-18 DIAGNOSIS — Z98.891 S/P CESAREAN SECTION: Primary | ICD-10-CM

## 2018-06-18 LAB
ABO + RH BLD: NORMAL
ABO + RH BLD: NORMAL
BLD GP AB SCN SERPL QL: NORMAL
BLOOD BANK CMNT PATIENT-IMP: NORMAL
BLOOD BANK CMNT PATIENT-IMP: NORMAL
HBA1C MFR BLD: 6 % (ref 0–5.6)
HGB BLD-MCNC: 10.5 G/DL (ref 11.7–15.7)
HGB BLD-MCNC: 13 G/DL (ref 11.7–15.7)
INR PPP: 0.96 (ref 0.86–1.14)
LACTATE BLD-SCNC: 1.4 MMOL/L (ref 0.7–2)
LMWH PPP CHRO-ACNC: 0.12 IU/ML
SPECIMEN EXP DATE BLD: NORMAL
T PALLIDUM AB SER QL: NONREACTIVE

## 2018-06-18 PROCEDURE — 71000014 ZZH RECOVERY PHASE 1 LEVEL 2 FIRST HR: Performed by: OBSTETRICS & GYNECOLOGY

## 2018-06-18 PROCEDURE — 27210794 ZZH OR GENERAL SUPPLY STERILE: Performed by: OBSTETRICS & GYNECOLOGY

## 2018-06-18 PROCEDURE — 25000128 H RX IP 250 OP 636: Performed by: STUDENT IN AN ORGANIZED HEALTH CARE EDUCATION/TRAINING PROGRAM

## 2018-06-18 PROCEDURE — 85018 HEMOGLOBIN: CPT | Performed by: OBSTETRICS & GYNECOLOGY

## 2018-06-18 PROCEDURE — 85520 HEPARIN ASSAY: CPT | Performed by: OBSTETRICS & GYNECOLOGY

## 2018-06-18 PROCEDURE — 83036 HEMOGLOBIN GLYCOSYLATED A1C: CPT | Performed by: OBSTETRICS & GYNECOLOGY

## 2018-06-18 PROCEDURE — 85610 PROTHROMBIN TIME: CPT | Performed by: OBSTETRICS & GYNECOLOGY

## 2018-06-18 PROCEDURE — 27110028 ZZH OR GENERAL SUPPLY NON-STERILE: Performed by: OBSTETRICS & GYNECOLOGY

## 2018-06-18 PROCEDURE — 86780 TREPONEMA PALLIDUM: CPT | Performed by: OBSTETRICS & GYNECOLOGY

## 2018-06-18 PROCEDURE — 25000132 ZZH RX MED GY IP 250 OP 250 PS 637: Performed by: STUDENT IN AN ORGANIZED HEALTH CARE EDUCATION/TRAINING PROGRAM

## 2018-06-18 PROCEDURE — 25000128 H RX IP 250 OP 636: Performed by: OBSTETRICS & GYNECOLOGY

## 2018-06-18 PROCEDURE — 25000132 ZZH RX MED GY IP 250 OP 250 PS 637: Performed by: OBSTETRICS & GYNECOLOGY

## 2018-06-18 PROCEDURE — 40000170 ZZH STATISTIC PRE-PROCEDURE ASSESSMENT II: Performed by: OBSTETRICS & GYNECOLOGY

## 2018-06-18 PROCEDURE — 86850 RBC ANTIBODY SCREEN: CPT | Performed by: STUDENT IN AN ORGANIZED HEALTH CARE EDUCATION/TRAINING PROGRAM

## 2018-06-18 PROCEDURE — 36415 COLL VENOUS BLD VENIPUNCTURE: CPT | Performed by: STUDENT IN AN ORGANIZED HEALTH CARE EDUCATION/TRAINING PROGRAM

## 2018-06-18 PROCEDURE — 86850 RBC ANTIBODY SCREEN: CPT | Performed by: OBSTETRICS & GYNECOLOGY

## 2018-06-18 PROCEDURE — 85018 HEMOGLOBIN: CPT | Performed by: STUDENT IN AN ORGANIZED HEALTH CARE EDUCATION/TRAINING PROGRAM

## 2018-06-18 PROCEDURE — 12000032 ZZH R&B OB CRITICAL UMMC

## 2018-06-18 PROCEDURE — 37000008 ZZH ANESTHESIA TECHNICAL FEE, 1ST 30 MIN: Performed by: OBSTETRICS & GYNECOLOGY

## 2018-06-18 PROCEDURE — 86901 BLOOD TYPING SEROLOGIC RH(D): CPT | Performed by: OBSTETRICS & GYNECOLOGY

## 2018-06-18 PROCEDURE — 36000057 ZZH SURGERY LEVEL 3 1ST 30 MIN - UMMC: Performed by: OBSTETRICS & GYNECOLOGY

## 2018-06-18 PROCEDURE — 71000015 ZZH RECOVERY PHASE 1 LEVEL 2 EA ADDTL HR: Performed by: OBSTETRICS & GYNECOLOGY

## 2018-06-18 PROCEDURE — 86900 BLOOD TYPING SEROLOGIC ABO: CPT | Performed by: STUDENT IN AN ORGANIZED HEALTH CARE EDUCATION/TRAINING PROGRAM

## 2018-06-18 PROCEDURE — 25000125 ZZHC RX 250: Performed by: STUDENT IN AN ORGANIZED HEALTH CARE EDUCATION/TRAINING PROGRAM

## 2018-06-18 PROCEDURE — 36000059 ZZH SURGERY LEVEL 3 EA 15 ADDTL MIN UMMC: Performed by: OBSTETRICS & GYNECOLOGY

## 2018-06-18 PROCEDURE — 86900 BLOOD TYPING SEROLOGIC ABO: CPT | Performed by: OBSTETRICS & GYNECOLOGY

## 2018-06-18 PROCEDURE — 83605 ASSAY OF LACTIC ACID: CPT | Performed by: STUDENT IN AN ORGANIZED HEALTH CARE EDUCATION/TRAINING PROGRAM

## 2018-06-18 PROCEDURE — C9290 INJ, BUPIVACAINE LIPOSOME: HCPCS | Performed by: STUDENT IN AN ORGANIZED HEALTH CARE EDUCATION/TRAINING PROGRAM

## 2018-06-18 PROCEDURE — 86901 BLOOD TYPING SEROLOGIC RH(D): CPT | Performed by: STUDENT IN AN ORGANIZED HEALTH CARE EDUCATION/TRAINING PROGRAM

## 2018-06-18 PROCEDURE — 37000009 ZZH ANESTHESIA TECHNICAL FEE, EACH ADDTL 15 MIN: Performed by: OBSTETRICS & GYNECOLOGY

## 2018-06-18 RX ORDER — ONDANSETRON 2 MG/ML
4 INJECTION INTRAMUSCULAR; INTRAVENOUS EVERY 6 HOURS PRN
Status: DISCONTINUED | OUTPATIENT
Start: 2018-06-18 | End: 2018-06-21 | Stop reason: HOSPADM

## 2018-06-18 RX ORDER — NALOXONE HYDROCHLORIDE 0.4 MG/ML
.1-.4 INJECTION, SOLUTION INTRAMUSCULAR; INTRAVENOUS; SUBCUTANEOUS
Status: ACTIVE | OUTPATIENT
Start: 2018-06-18 | End: 2018-06-19

## 2018-06-18 RX ORDER — IBUPROFEN 400 MG/1
400 TABLET, FILM COATED ORAL EVERY 6 HOURS PRN
Status: DISCONTINUED | OUTPATIENT
Start: 2018-06-18 | End: 2018-06-21 | Stop reason: HOSPADM

## 2018-06-18 RX ORDER — FENTANYL CITRATE 50 UG/ML
25-50 INJECTION, SOLUTION INTRAMUSCULAR; INTRAVENOUS
Status: DISCONTINUED | OUTPATIENT
Start: 2018-06-18 | End: 2018-06-18 | Stop reason: HOSPADM

## 2018-06-18 RX ORDER — ACETAMINOPHEN 325 MG/1
650 TABLET ORAL EVERY 4 HOURS PRN
Status: DISCONTINUED | OUTPATIENT
Start: 2018-06-21 | End: 2018-06-21 | Stop reason: HOSPADM

## 2018-06-18 RX ORDER — KETOROLAC TROMETHAMINE 30 MG/ML
30 INJECTION, SOLUTION INTRAMUSCULAR; INTRAVENOUS EVERY 6 HOURS
Status: DISCONTINUED | OUTPATIENT
Start: 2018-06-18 | End: 2018-06-19

## 2018-06-18 RX ORDER — LANOLIN 100 %
OINTMENT (GRAM) TOPICAL
Status: DISCONTINUED | OUTPATIENT
Start: 2018-06-18 | End: 2018-06-21 | Stop reason: HOSPADM

## 2018-06-18 RX ORDER — METHYLERGONOVINE MALEATE 0.2 MG/ML
INJECTION INTRAVENOUS
Status: DISCONTINUED
Start: 2018-06-18 | End: 2018-06-18 | Stop reason: HOSPADM

## 2018-06-18 RX ORDER — HYDROCORTISONE 2.5 %
CREAM (GRAM) TOPICAL 3 TIMES DAILY PRN
Status: DISCONTINUED | OUTPATIENT
Start: 2018-06-18 | End: 2018-06-21 | Stop reason: HOSPADM

## 2018-06-18 RX ORDER — OXYTOCIN/0.9 % SODIUM CHLORIDE 30/500 ML
340 PLASTIC BAG, INJECTION (ML) INTRAVENOUS CONTINUOUS PRN
Status: DISCONTINUED | OUTPATIENT
Start: 2018-06-18 | End: 2018-06-21 | Stop reason: HOSPADM

## 2018-06-18 RX ORDER — OXYTOCIN 10 [USP'U]/ML
10 INJECTION, SOLUTION INTRAMUSCULAR; INTRAVENOUS
Status: DISCONTINUED | OUTPATIENT
Start: 2018-06-18 | End: 2018-06-21 | Stop reason: HOSPADM

## 2018-06-18 RX ORDER — NALOXONE HYDROCHLORIDE 0.4 MG/ML
.1-.4 INJECTION, SOLUTION INTRAMUSCULAR; INTRAVENOUS; SUBCUTANEOUS
Status: DISCONTINUED | OUTPATIENT
Start: 2018-06-18 | End: 2018-06-18

## 2018-06-18 RX ORDER — OXYCODONE HYDROCHLORIDE 5 MG/1
5-10 TABLET ORAL
Status: DISCONTINUED | OUTPATIENT
Start: 2018-06-18 | End: 2018-06-21 | Stop reason: HOSPADM

## 2018-06-18 RX ORDER — DEXTROSE, SODIUM CHLORIDE, SODIUM LACTATE, POTASSIUM CHLORIDE, AND CALCIUM CHLORIDE 5; .6; .31; .03; .02 G/100ML; G/100ML; G/100ML; G/100ML; G/100ML
INJECTION, SOLUTION INTRAVENOUS CONTINUOUS
Status: DISCONTINUED | OUTPATIENT
Start: 2018-06-18 | End: 2018-06-21 | Stop reason: HOSPADM

## 2018-06-18 RX ORDER — SODIUM CHLORIDE, SODIUM LACTATE, POTASSIUM CHLORIDE, CALCIUM CHLORIDE 600; 310; 30; 20 MG/100ML; MG/100ML; MG/100ML; MG/100ML
INJECTION, SOLUTION INTRAVENOUS CONTINUOUS PRN
Status: DISCONTINUED | OUTPATIENT
Start: 2018-06-18 | End: 2018-06-18

## 2018-06-18 RX ORDER — KETOROLAC TROMETHAMINE 30 MG/ML
INJECTION, SOLUTION INTRAMUSCULAR; INTRAVENOUS PRN
Status: DISCONTINUED | OUTPATIENT
Start: 2018-06-18 | End: 2018-06-18

## 2018-06-18 RX ORDER — AMOXICILLIN 250 MG
1 CAPSULE ORAL 2 TIMES DAILY PRN
Status: DISCONTINUED | OUTPATIENT
Start: 2018-06-18 | End: 2018-06-21 | Stop reason: HOSPADM

## 2018-06-18 RX ORDER — OXYTOCIN/0.9 % SODIUM CHLORIDE 30/500 ML
PLASTIC BAG, INJECTION (ML) INTRAVENOUS CONTINUOUS PRN
Status: DISCONTINUED | OUTPATIENT
Start: 2018-06-18 | End: 2018-06-18

## 2018-06-18 RX ORDER — ONDANSETRON 2 MG/ML
4 INJECTION INTRAMUSCULAR; INTRAVENOUS EVERY 30 MIN PRN
Status: DISCONTINUED | OUTPATIENT
Start: 2018-06-18 | End: 2018-06-18 | Stop reason: HOSPADM

## 2018-06-18 RX ORDER — BUPIVACAINE HYDROCHLORIDE 7.5 MG/ML
INJECTION, SOLUTION INTRASPINAL
Status: DISCONTINUED
Start: 2018-06-18 | End: 2018-06-18 | Stop reason: HOSPADM

## 2018-06-18 RX ORDER — BUPIVACAINE HYDROCHLORIDE 7.5 MG/ML
INJECTION, SOLUTION INTRASPINAL PRN
Status: DISCONTINUED | OUTPATIENT
Start: 2018-06-18 | End: 2018-06-18

## 2018-06-18 RX ORDER — SODIUM CHLORIDE, SODIUM LACTATE, POTASSIUM CHLORIDE, CALCIUM CHLORIDE 600; 310; 30; 20 MG/100ML; MG/100ML; MG/100ML; MG/100ML
INJECTION, SOLUTION INTRAVENOUS CONTINUOUS
Status: DISCONTINUED | OUTPATIENT
Start: 2018-06-18 | End: 2018-06-18 | Stop reason: HOSPADM

## 2018-06-18 RX ORDER — MISOPROSTOL 200 UG/1
400 TABLET ORAL
Status: DISCONTINUED | OUTPATIENT
Start: 2018-06-18 | End: 2018-06-21 | Stop reason: HOSPADM

## 2018-06-18 RX ORDER — ONDANSETRON 2 MG/ML
INJECTION INTRAMUSCULAR; INTRAVENOUS PRN
Status: DISCONTINUED | OUTPATIENT
Start: 2018-06-18 | End: 2018-06-18

## 2018-06-18 RX ORDER — CEFAZOLIN SODIUM 2 G/100ML
2 INJECTION, SOLUTION INTRAVENOUS
Status: DISCONTINUED | OUTPATIENT
Start: 2018-06-18 | End: 2018-06-18 | Stop reason: HOSPADM

## 2018-06-18 RX ORDER — DIPHENHYDRAMINE HCL 25 MG
25 CAPSULE ORAL EVERY 6 HOURS PRN
Status: DISCONTINUED | OUTPATIENT
Start: 2018-06-18 | End: 2018-06-21 | Stop reason: HOSPADM

## 2018-06-18 RX ORDER — IBUPROFEN 800 MG/1
800 TABLET, FILM COATED ORAL EVERY 6 HOURS PRN
Status: DISCONTINUED | OUTPATIENT
Start: 2018-06-18 | End: 2018-06-21 | Stop reason: HOSPADM

## 2018-06-18 RX ORDER — ONDANSETRON 4 MG/1
4 TABLET, ORALLY DISINTEGRATING ORAL EVERY 30 MIN PRN
Status: DISCONTINUED | OUTPATIENT
Start: 2018-06-18 | End: 2018-06-18 | Stop reason: HOSPADM

## 2018-06-18 RX ORDER — CITRIC ACID/SODIUM CITRATE 334-500MG
30 SOLUTION, ORAL ORAL
Status: COMPLETED | OUTPATIENT
Start: 2018-06-18 | End: 2018-06-18

## 2018-06-18 RX ORDER — CEFAZOLIN SODIUM 1 G/3ML
1 INJECTION, POWDER, FOR SOLUTION INTRAMUSCULAR; INTRAVENOUS SEE ADMIN INSTRUCTIONS
Status: DISCONTINUED | OUTPATIENT
Start: 2018-06-18 | End: 2018-06-18 | Stop reason: HOSPADM

## 2018-06-18 RX ORDER — IBUPROFEN 600 MG/1
600 TABLET, FILM COATED ORAL EVERY 6 HOURS PRN
Status: DISCONTINUED | OUTPATIENT
Start: 2018-06-18 | End: 2018-06-21 | Stop reason: HOSPADM

## 2018-06-18 RX ORDER — FENTANYL CITRATE 50 UG/ML
INJECTION, SOLUTION INTRAMUSCULAR; INTRAVENOUS PRN
Status: DISCONTINUED | OUTPATIENT
Start: 2018-06-18 | End: 2018-06-18

## 2018-06-18 RX ORDER — OXYTOCIN/0.9 % SODIUM CHLORIDE 30/500 ML
PLASTIC BAG, INJECTION (ML) INTRAVENOUS
Status: DISCONTINUED
Start: 2018-06-18 | End: 2018-06-18 | Stop reason: HOSPADM

## 2018-06-18 RX ORDER — DIPHENHYDRAMINE HYDROCHLORIDE 50 MG/ML
25 INJECTION INTRAMUSCULAR; INTRAVENOUS EVERY 6 HOURS PRN
Status: DISCONTINUED | OUTPATIENT
Start: 2018-06-18 | End: 2018-06-21 | Stop reason: HOSPADM

## 2018-06-18 RX ORDER — LIDOCAINE 40 MG/G
CREAM TOPICAL
Status: DISCONTINUED | OUTPATIENT
Start: 2018-06-18 | End: 2018-06-21 | Stop reason: HOSPADM

## 2018-06-18 RX ORDER — BISACODYL 10 MG
10 SUPPOSITORY, RECTAL RECTAL DAILY PRN
Status: DISCONTINUED | OUTPATIENT
Start: 2018-06-20 | End: 2018-06-21 | Stop reason: HOSPADM

## 2018-06-18 RX ORDER — MORPHINE SULFATE 1 MG/ML
INJECTION, SOLUTION EPIDURAL; INTRATHECAL; INTRAVENOUS
Status: DISCONTINUED
Start: 2018-06-18 | End: 2018-06-18 | Stop reason: HOSPADM

## 2018-06-18 RX ORDER — MORPHINE SULFATE 1 MG/ML
INJECTION, SOLUTION EPIDURAL; INTRATHECAL; INTRAVENOUS
Status: DISCONTINUED
Start: 2018-06-18 | End: 2018-06-18 | Stop reason: WASHOUT

## 2018-06-18 RX ORDER — AMOXICILLIN 250 MG
2 CAPSULE ORAL 2 TIMES DAILY PRN
Status: DISCONTINUED | OUTPATIENT
Start: 2018-06-18 | End: 2018-06-21 | Stop reason: HOSPADM

## 2018-06-18 RX ORDER — ACETAMINOPHEN 325 MG/1
975 TABLET ORAL EVERY 8 HOURS
Status: COMPLETED | OUTPATIENT
Start: 2018-06-18 | End: 2018-06-21

## 2018-06-18 RX ORDER — MORPHINE SULFATE 1 MG/ML
INJECTION, SOLUTION EPIDURAL; INTRATHECAL; INTRAVENOUS PRN
Status: DISCONTINUED | OUTPATIENT
Start: 2018-06-18 | End: 2018-06-18

## 2018-06-18 RX ORDER — SIMETHICONE 80 MG
80 TABLET,CHEWABLE ORAL 4 TIMES DAILY PRN
Status: DISCONTINUED | OUTPATIENT
Start: 2018-06-18 | End: 2018-06-21 | Stop reason: HOSPADM

## 2018-06-18 RX ORDER — OXYTOCIN/0.9 % SODIUM CHLORIDE 30/500 ML
100 PLASTIC BAG, INJECTION (ML) INTRAVENOUS CONTINUOUS
Status: DISCONTINUED | OUTPATIENT
Start: 2018-06-18 | End: 2018-06-21 | Stop reason: HOSPADM

## 2018-06-18 RX ADMIN — SODIUM CHLORIDE, SODIUM LACTATE, POTASSIUM CHLORIDE, CALCIUM CHLORIDE, AND DEXTROSE MONOHYDRATE: 600; 310; 30; 20; 5 INJECTION, SOLUTION INTRAVENOUS at 22:00

## 2018-06-18 RX ADMIN — ONDANSETRON 4 MG: 2 INJECTION INTRAMUSCULAR; INTRAVENOUS at 09:28

## 2018-06-18 RX ADMIN — OXYTOCIN-SODIUM CHLORIDE 0.9% IV SOLN 30 UNIT/500ML 100 ML/HR: 30-0.9/5 SOLUTION at 14:02

## 2018-06-18 RX ADMIN — BUPIVACAINE HYDROCHLORIDE IN DEXTROSE 1.6 ML: 7.5 INJECTION, SOLUTION SUBARACHNOID at 09:06

## 2018-06-18 RX ADMIN — OXYTOCIN-SODIUM CHLORIDE 0.9% IV SOLN 30 UNIT/500ML 300 ML/HR: 30-0.9/5 SOLUTION at 09:28

## 2018-06-18 RX ADMIN — KETOROLAC TROMETHAMINE 30 MG: 30 INJECTION, SOLUTION INTRAMUSCULAR at 09:56

## 2018-06-18 RX ADMIN — PHENYLEPHRINE HYDROCHLORIDE 0.5 MCG/KG/MIN: 10 INJECTION, SOLUTION INTRAMUSCULAR; INTRAVENOUS; SUBCUTANEOUS at 09:06

## 2018-06-18 RX ADMIN — OXYCODONE HYDROCHLORIDE 10 MG: 5 TABLET ORAL at 13:51

## 2018-06-18 RX ADMIN — MORPHINE SULFATE 0.15 MG: 1 INJECTION, SOLUTION EPIDURAL; INTRATHECAL; INTRAVENOUS at 09:06

## 2018-06-18 RX ADMIN — FENTANYL CITRATE 15 MCG: 50 INJECTION, SOLUTION INTRAMUSCULAR; INTRAVENOUS at 09:06

## 2018-06-18 RX ADMIN — SENNOSIDES AND DOCUSATE SODIUM 2 TABLET: 8.6; 5 TABLET ORAL at 22:15

## 2018-06-18 RX ADMIN — KETOROLAC TROMETHAMINE 30 MG: 30 INJECTION, SOLUTION INTRAMUSCULAR at 15:58

## 2018-06-18 RX ADMIN — ENOXAPARIN SODIUM 120 MG: 120 INJECTION SUBCUTANEOUS at 23:36

## 2018-06-18 RX ADMIN — ACETAMINOPHEN 975 MG: 325 TABLET, FILM COATED ORAL at 22:15

## 2018-06-18 RX ADMIN — SODIUM CHLORIDE, POTASSIUM CHLORIDE, SODIUM LACTATE AND CALCIUM CHLORIDE 1000 ML: 600; 310; 30; 20 INJECTION, SOLUTION INTRAVENOUS at 08:07

## 2018-06-18 RX ADMIN — SODIUM CHLORIDE, POTASSIUM CHLORIDE, SODIUM LACTATE AND CALCIUM CHLORIDE: 600; 310; 30; 20 INJECTION, SOLUTION INTRAVENOUS at 09:35

## 2018-06-18 RX ADMIN — SODIUM CITRATE AND CITRIC ACID MONOHYDRATE 30 ML: 500; 334 SOLUTION ORAL at 08:25

## 2018-06-18 RX ADMIN — BUPIVACAINE 20 ML: 13.3 INJECTION, SUSPENSION, LIPOSOMAL INFILTRATION at 10:27

## 2018-06-18 RX ADMIN — ACETAMINOPHEN 975 MG: 325 TABLET, FILM COATED ORAL at 13:51

## 2018-06-18 RX ADMIN — SODIUM CHLORIDE, POTASSIUM CHLORIDE, SODIUM LACTATE AND CALCIUM CHLORIDE: 600; 310; 30; 20 INJECTION, SOLUTION INTRAVENOUS at 08:50

## 2018-06-18 RX ADMIN — KETOROLAC TROMETHAMINE 30 MG: 30 INJECTION, SOLUTION INTRAMUSCULAR at 22:15

## 2018-06-18 RX ADMIN — OXYCODONE HYDROCHLORIDE 10 MG: 5 TABLET ORAL at 17:17

## 2018-06-18 RX ADMIN — PHENYLEPHRINE HYDROCHLORIDE 200 MCG: 10 INJECTION, SOLUTION INTRAMUSCULAR; INTRAVENOUS; SUBCUTANEOUS at 09:10

## 2018-06-18 NOTE — PROGRESS NOTES
Transferred to [facility] via cart holding her  in her arms.  Comfortable and stable.  Will present and supportive.  Id bands checked/ matched with TANGELA Cornelius with bedside report.  Urine was concentrated and approximately 100 ml total at 1300.  Dr. White had been updated and pt had another 500 ml LR bolus which was completed at transfer.  Pt signed Parent Agreement for Baby to Receive Donor Breast Milk.  See 's note.  Combined SQBL and PACU  Was 1070 g reported to Ashli HONEYCUTT and Dr. White.  Pt comfortable while in PACU.  Abuse questions need to be asked and Ashli HONEYCUTT updated.  Call light is within mother's reach.

## 2018-06-18 NOTE — OP NOTE
[facility]  Full Operative Progress Note     Surgery Date:  2018    Surgeon:  Junie Hightower MD    Assistants:  Catrina White MD PGY1    Pre-op Diagnosis:                     -  at 39w0d   - LGA projected EFW >5000g  - Homozygous Factor V leiden  - Hx of PE, DVT   - Rh negative  - Adenocarcinoma of cervix s/p cone excision () of conization , normal paps -present  - Possible velamentous cord insertion     Post-op Diagnosis:                   - Same as above, now  s/p procedure below with delivery of viable infant    Procedure:  low-transverse  section with double layer uterine closure via pfannenstiel incision    Anesthesia: spinal     QBL: 915cc    IVF: 1000 mL crystalloid    UOP:  25 mL clear urine at the end of the case    Drains: Colmenares Catheter     Specimens: cord blood and cord segment    Complications: None apparent    Indications:   Benita Olivo is a 37 year old  at 38w6d admitted for primary  section for LGA, projected EFW >5000g.  The risks, benefits, and alternatives of  section were discussed with the patient, and she agreed to proceed.     Findings:   1. Intact anterior placenta with velamentous cord insertion   2. Clear amniotic fluid  3. Liveborn female infant in cephalic CEE presentation. Apgars 9 at 1 minute & 9 at 5 minutes. Weight 4593 grams.  4. Normal uterus, fallopian tubes, and ovaries.     Procedure Details:   The patient was brought to the OR, where adequate spinal anesthesia was administered.  She was placed in the dorsal supine position with a slight leftward tilt. She was prepped and draped in the usual sterile fashion. Ancef given. A surgical time out was performed. A pfannenstiel skin incision was made with the scalpel, and carried down to the underlying fascia with sharp and blunt dissection. The fascia was incised in the midline, and bluntly extended laterally. The rectus muscles were  in  the midline, and the peritoneum was entered bluntly, and the opening was extended with digital pressure. The bladder blade was placed. A transverse hysterotomy was made with the scalpel in the lower uterine segment, and the incision was extended with digital pressure. The infant was noted to be in the cephalic CEE position, and was delivered atraumatically. The shoulders delivered easily.  No nuchal cord was noted. The cord was doubly clamped and cut after one minute delayed cord clamping, and the infant was handed off to the awaiting nursery staff. A segment of cord was cut. The placenta was delivered with gentle traction on the umbilical cord and uterine massage. The uterus was exteriorized and cleared of all clots and debris. Uterine tone was noted to be moderately firm with 20 units of pitocin given through the running IV and uterine massage.  The hysterotomy was closed with a running locked suture of 0 Vicryl.  The hysterotomy was then imbricated using an 0 Monocryl suture. The hysterotomy was noted to be hemostatic. The posterior cul-de-sac was suctioned and cleared of all clots and debris. The uterus was returned to the abdomen. The pericolic gutters were suctioned and cleared of all clots and debris. The hysterotomy was reexamined and noted to be hemostatic. The fascia and rectus muscles were examined and areas of oozing were controlled with electrocautery. The fascia was closed with a running 0 Vicryl suture. The subcutaneous tissue was irrigated and areas of oozing were controlled with electrocautery. The subcutaneous tissue was greater than 2 cm in thickness, and was therefore closed with a running 2-0 plain suture. The skin was closed with 4-0 Monocryl and covered with steri strips and a sterile dressing.    All sponge, needle, and instrument counts were correct. The patient tolerated the procedure well, and was transferred to recovery in stable condition. Dr. Hightower was present and scrubbed for the  entirety of the procedure.     Catrina White MD   OB/GYN Resident PGY-2  6/18/2018 11:36 AM    I was present during the entire procedure detailed above.     Junie Hightower MD

## 2018-06-18 NOTE — BRIEF OP NOTE
[facility]   Brief Operative Note     Surgery Date: 2018    Surgeon:  Junie Hightower MD    Assistants:  Catrina White MD, PGY-2    Pre-op Diagnosis:    -  at 39w0d   - LGA projected EFW >5000g  - Homozygous Factor V leiden- on lovenox 100 mg BID  - Hx of PE, DVT   - Rh negative  - Adenocarcinoma of cervix s/p cone excision () of conization , normal paps -present  - Velamentous cord insertion      Post-op Diagnosis:    - Same as above, now  s/p procedure below with delivery of viable infant    Procedure:  Primary low-transverse  section with double layer uterine closure via Pfannenstiel incision    Anesthesia: Spinal with TAP block    QBL:  780 cc    IVF:  400 cc crystalloid    UOP:  25 cc clear urine at the end of the case    Drains: Colmenares Catheter     Specimens:  None    Complications: None apparent    Findings:   1. Intact anterior placenta with velamentous cord insertion  2. Clear amniotic fluid  3. Liveborn female infant in cephalic presentation at 0926 on 2018. Apgars 9 at 1 minute & 9 at 5 minutes. Weight pending.  4. Normal uterus, fallopian tubes, and ovaries.     Disposition: Transferred in stable condition to CHANDA Hightower MD

## 2018-06-18 NOTE — ANESTHESIA PROCEDURE NOTES
Spinal/LP Procedure Note    Spinal Block  Staff:     Anesthesiologist:  DIPIKA SANON    Resident/CRNA:  JACKSON UMAÑA    Spinal/LP performed by resident/CRNA in presence of a teaching physician.    Location: OR  Procedure Start/Stop Times:      6/18/2018 9:00 AM     6/18/2018 9:10 AM    patient identified, IV checked, site marked, risks and benefits discussed, informed consent, monitors and equipment checked, pre-op evaluation, at physician/surgeon's request and post-op pain management      Correct Patient: Yes      Correct Position: Yes      Correct Site: Yes      Correct Procedure: Yes      Correct Laterality:  N/A    Site Marked:  N/A  Procedure:     Procedure:  Intrathecal    ASA:  3    Position:  Sitting    Sterile Prep: Betadine, chloraprep, alcohol, mask, sterile gloves and patient draped      Insertion site:  L3-4    Approach:  Midline    Needle Type:  Edie    Needle gauge (G):  20    Local Skin Infiltration:  1% lidocaine    amount (ml):  3    Needle Length (in):  3.5    Introducer used: Yes      Introducer gauge:  20 G    Attempts:  1    Redirects:  0    CSF:  Clear    Paresthesias:  No  Assessment/Narrative:     Sensory Level:  T5

## 2018-06-18 NOTE — ANESTHESIA PROCEDURE NOTES
Peripheral Nerve Block Procedure Note    Staff:     Anesthesiologist:  DIPIKA SANON    Resident/CRNA:  JACKSON UMAÑA    Block performed by resident/CRNA in the presence of a teaching physician    Location: PACU  Procedure Start/Stop TImes:      6/18/2018 10:45 AM     6/18/2018 10:55 AM    patient identified, IV checked, site marked, risks and benefits discussed, informed consent, monitors and equipment checked, pre-op evaluation, at physician/surgeon's request and post-op pain management      Correct Patient: Yes      Correct Position: Yes      Correct Site: Yes      Correct Procedure: Yes      Correct Laterality:  Yes    Site Marked:  Yes  Procedure details:     Procedure:  TAP    ASA:  2    Laterality:  Bilateral    Position:  Supine    Sterile Prep: Betadine, chloraprep, mask and sterile gloves      Needle gauge:  20    Needle length (inches):  3.1    Ultrasound: Yes      Ultrasound used to identify targeted nerve, plexus, or vascular structure and placed a needle adjacent to it      Permanent Image entered into patiient's record      Bolus via:  Needle    Infusion Method:  Single Shot    Complications:  None

## 2018-06-18 NOTE — PROVIDER NOTIFICATION
06/18/18 1025   Provider Notification   Provider Name/Title Dr Aquilino JAMES   Method of Notification At Bedside   updated on bp and VS.  No new orders.

## 2018-06-18 NOTE — PLAN OF CARE
Problem: Patient Care Overview  Goal: Plan of Care/Patient Progress Review  Outcome: Improving  Patient arrived at 1315, assisted with two to bed on cowan mat, Room orientation completed. VS'S 98/56, HR 82 Resp 16. SATs 100% at RA. Fundus firm at U/1, small bleeding. Colmenares patent with scant pelon urine. Encouraged fluids tolerated well. C/O pain , gave Oxycodone 10 mg and scheduled Tylenol 975 mg. Second bag of pitocin started and infusing at 100 cc /hr.Will has patient start pumping. Will continue to monitor.

## 2018-06-18 NOTE — ANESTHESIA POSTPROCEDURE EVALUATION
Patient: Benita Olivo    Procedure(s):  Primary  Section - Wound Class: II-Clean Contaminated    Diagnosis:Suspected Fetal Macrosomia   Diagnosis Additional Information: No value filed.    Anesthesia Type:  Spinal    Note:  Anesthesia Post Evaluation    Patient location during evaluation: OB PACU  Patient participation: Able to participate in evaluation but full recovery from regional anesthesia has not yet ocurrred but is anticipated to occur within 48 hours (Spinal wearing off)  Level of consciousness: awake and alert  Pain management: adequate  Airway patency: patent  Cardiovascular status: acceptable  Respiratory status: acceptable  Hydration status: acceptable  PONV: none     Anesthetic complications: None          Last vitals:  Vitals:    18 1040 18 1055 18 1106   BP: 99/60 (!) 87/60 98/71   Pulse:   92   Resp: 16  16   Temp:  36.5  C (97.7  F)    SpO2: 94%  95%         Electronically Signed By: Jonathon Guevara MD  2018  11:20 AM

## 2018-06-18 NOTE — PLAN OF CARE
Pt arrived for 0830 C/S. Pt dressed in gown, abdomen saged and clipped, SCDs applied, monitors applied. Dr. Perea in to consent pt for research study. Question regarding labs needed - MD note from clinic says repeat INR but pt reports has not had INR drawn throughout pregnancy. Last had lovenox Saturday 6/16 at 2030. Clarified on labs - will draw for both INR and heparin 10a. IV packs in place for IV start. Report given to Jennifer ALONSO RN.
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
## 2018-06-18 NOTE — DISCHARGE SUMMARY
DELIVERY DISCHARGE SUMMARY    Admit date: 18  Admitting Attending: Junie Hightower MD  Discharge date: 2018  Discharge Attending: Andre Rivera MD    Admit Dx:   -  at 39w0d   - LGA projected EFW >5000g  - Desire for primary c/s for LGA  - Homozygous Factor V leiden- on lovenox 120 mg BID  - Hx of PE, DVT   - Rh negative  -Adenocarcinoma of cervix s/p cone excision () of conization , normal paps -present  - Possible velamentous cord insertion    Discharge Dx:  - Same as above, now  s/p procedure below with delivery of viable infant    Procedures:  - low transverse  section with double layer closure via Pfannenstiel incision  - Spinal analgesia  - TAP block    Admit HPI:  Ms. Benita Olivo is a 37 year old  at 39w0d who was admitted on 18 for primary  section for LGA, EFW projected >5000g.  Please see her admit H&P for full details of her PMH, PSH, Meds, Allergies and exam on admit.    Hospital course:  After discussion of risk and and benefits and signing informed consent the patient was taken to the operating room for   section.    EBL from the delivery was 915. Findings below. Please see her  Section Operative Note for full details regarding her delivery.    Findings:   1. Intact anterior placenta with velamentous cord insertion   2. Clear amniotic fluid  3. Liveborn female infant in cephalic CEE presentation. Apgars 9 at 1 minute & 9 at 5 minutes. Weight 4593 grams.  4. Normal uterus, fallopian tubes, and ovaries.     Her postoperative course was uncomplicated. On POD#3 she was meeting all of her postpartum goals and deemed stable for discharge. She was voiding without difficulty, tolerating a regular diet without nausea and vomiting, her pain was well controlled on oral pain medicines and her lochia was appropriate. Her hemoglobin prior to delivery was 13.0 and after delivery was 10.4. Her Rh status was neg and Rhogam  given on POD#2.      She continued her therapeutic lovenox of 120mg BID while inpatient with plan to follow up with her hematologist to adjust her dose in the postpartum period    Discharge Medications:     Review of your medicines      START taking       Dose / Directions    acetaminophen 325 MG tablet   Commonly known as:  TYLENOL        Dose:  650 mg   Take 2 tablets (650 mg) by mouth every 4 hours as needed for mild pain   Quantity:  100 tablet   Refills:  0       ibuprofen 600 MG tablet   Commonly known as:  ADVIL/MOTRIN        Dose:  600 mg   Take 1 tablet (600 mg) by mouth every 6 hours as needed for other (cramping)   Quantity:  60 tablet   Refills:  0       oxyCODONE IR 5 MG tablet   Commonly known as:  ROXICODONE        Dose:  5-10 mg   Take 1-2 tablets (5-10 mg) by mouth every 4 hours as needed for moderate to severe pain   Quantity:  25 tablet   Refills:  0       polyethylene glycol powder   Commonly known as:  MIRALAX   Used for:  Drug-induced constipation        Dose:  1 capful   Take 17 g (1 capful) by mouth daily   Quantity:  510 g   Refills:  1       senna-docusate 8.6-50 MG per tablet   Commonly known as:  SENOKOT-S;PERICOLACE        Dose:  1-2 tablet   Take 1-2 tablets by mouth 2 times daily   Quantity:  60 tablet   Refills:  0         CONTINUE these medicines which have NOT CHANGED       Dose / Directions    breast pump Misc   Used for:  Disorder of lactation        Dose:  1 each   1 each as needed   Quantity:  1 each   Refills:  0       enoxaparin 120 MG/0.8ML injection   Commonly known as:  LOVENOX   Used for:  Current use of long term anticoagulation, Homozygous Factor V Leiden mutation (H)        Dose:  120 mg   Inject 0.8 mLs (120 mg) Subcutaneous every 12 hours   Quantity:  60 Syringe   Refills:  4       order for DME   Used for:  High-risk pregnancy in third trimester        Maternity Belt order   Quantity:  1 each   Refills:  0       PRENATAL VITAMIN PO        Refills:  0       ranitidine  75 MG tablet   Commonly known as:  ZANTAC   Used for:  Heartburn        Dose:  150 mg   Take 2 tablets (150 mg) by mouth 2 times daily   Quantity:  30 tablet   Refills:  3       VITAMIN D (CHOLECALCIFEROL) PO        Dose:  5000 Units   Take 5,000 Units by mouth daily   Refills:  0            Where to get your medicines      These medications were sent to [facility] [place], [place] - [address]  [address] [facility] [address], [facility] [address]     Phone:  [phone]      ibuprofen 600 MG tablet     polyethylene glycol powder     senna-docusate 8.6-50 MG per tablet         Some of these will need a paper prescription and others can be bought over the counter. Ask your nurse if you have questions.     Bring a paper prescription for each of these medications      oxyCODONE IR 5 MG tablet       You don't need a prescription for these medications      acetaminophen 325 MG tablet             Discharge/Disposition:  Benita Olivo was discharged to home in stable condition with the following instructions/medications:  1) Call for temperature > 100.4, bright red vaginal bleeding >1 pad an hour x 2 hours, foul smelling vaginal discharge, pain not controlled by usual oral pain meds, persistent nausea and vomiting not controlled on medications, drainage or redness from incision site  2) She declined contraception.  3) For feeding she decided to breast.  4) She was instructed to follow-up with her primary OB in 6 weeks for a routine postpartum visit and a 1week wound check. She also will follow up with her hematologist in 1-2 weeks to f/u on lovenox dosing in the postpartum period.  5) Discharge activity:  No heavy lifting >15 lbs or strenuous activity for 6 weeks, pelvic rest for 6 weeks, no driving or operating machinery while on narcotics.    # Discharge Pain Plan:  - During her hospitalization, Benita experienced pain due to  section.  The pain plan for discharge was discussed with Benita and  the plan was created in a collaborative fashion.    - Opioids prescribed on discharge: oxycodone  - Duration of opioids after discharge: Per CDC opioid prescribing guidelines, a 3 day prescription of opioids was provided.  - Bowel regimen: senna, miralax and docusate  - Pharmacologic adjuvants:  NSAIDs and Acetaminophen      Tory Mcfadden MD  Obstetrics and Gyncology, PGY-2  June 21, 2018 , 7:56 AM      Physician Attestation   I, Andre Rivera, saw and evaluated this patient prior to discharge.  I discussed the patient with the resident and/or medical student and agree with plan of care as documented in the note.      I personally reviewed vital signs.    I personally spent 10 minutes on discharge activities.    Andre Rivera MD  Date of Service (when I saw the patient): 06/21/18
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
## 2018-06-18 NOTE — PROVIDER NOTIFICATION
06/18/18 1321   Provider Notification   Provider Name/Title Dr. White   Method of Notification Electronic Page   Total SQBL and PACU QBL is 1070.

## 2018-06-18 NOTE — IP AVS SNAPSHOT
MRN:[id number]                      After Visit Summary   2018    Benita Olivo    MRN: [id number]           Thank you!     Thank you for choosing [facility] for your care. Our goal is always to provide you with excellent care. Hearing back from our patients is one way we can continue to improve our services. Please take a few minutes to complete the written survey that you may receive in the mail after you visit with us. Thank you!        Patient Information     Date Of Birth          1980        About your hospital stay     You were admitted on:  2018 [facility]    You were discharged on:  2018        Reason for your hospital stay       Maternity care                  Who to Call     For medical emergencies, please call 911.  For non-urgent questions about your medical care, please call your primary care provider or clinic, None  For questions related to your surgery, please call your surgery clinic        Attending Provider     Provider Specialty    Junie Hightower MD OB/Gyn       Primary Care Provider Fax #    Physician No Ref-Primary [phone]      After Care Instructions     Activity       Review discharge instructions            Diet       Resume previous diet            Discharge Instructions - Postpartum visit       Schedule postpartum visit with your provider and return to clinic in 6 weeks.                  Follow-up Appointments     Follow Up and recommended labs and tests       Follow-up with your OBGYN in 6 weeks                  Further instructions from your care team       Postop  Birth Instructions    Activity       Do not lift more than 10 pounds for 6 weeks after surgery.  Ask family and friends for help when you need it.    No driving until you have stopped taking your pain medications (usually two weeks after surgery).    No heavy exercise or activity for 6 weeks.  Don't do anything that will put a  strain on your surgery site.    Don't strain when using the toilet.  Your care team may prescribe a stool softener if you have problems with your bowel movements.     To care for your incision:       Keep the incision clean and dry.    Do not soak your incision in water. No swimming or hot tubs until it has fully healed. You may soak in the bathtub if the water level is below your incision.    Do not use peroxide, gel, cream, lotion, or ointment on your incision.    Adjust your clothes to avoid pressure on your surgery site (check the elastic in your underwear for example).     You may see a small amount of clear or pink drainage and this is normal.  Check with your health care provider:       If the drainage increases or has an odor.    If the incision reddens, you have swelling, or develop a rash.    If you have increased pain and the medicine we prescribed doesn't help.    If you have a fever above 100.4 F (38 C) with or without chills when placing thermometer under your tongue.   The area around your incision (surgery wound), will feel numb.  This is normal. The numbness should go away in less than a year.     Keep your hands clean:  Always wash your hands before touching your incision (surgery wound). This helps reduce your risk of infection. If your hands aren't dirty, you may use an alcohol hand-rub to clean your hands. Keep your nails clean and short.    Call your healthcare provider if you have any of these symptoms:       You soak a sanitary pad with blood within 1 hour, or you see blood clots larger than a golf ball.    Bleeding that lasts more than 6 weeks.    Vaginal discharge that smells bad.    Severe pain, cramping or tenderness in your lower belly area.    A need to urinate more frequently (use the toilet more often), more urgently (use the toilet very quickly), or it burns when you urinate.    Nausea and vomiting.    Redness, swelling or pain around a vein in your leg.    Problems breastfeeding or  "a red or painful area on your breast.    Chest pain and cough or are gasping for air.    Problems with coping with sadness, anxiety or depression. If you have concerns about hurting yourself or the baby, call your provider immediately.      You have questions or concerns after you return home.                  Pending Results     No orders found from 6/16/2018 to 6/19/2018.            Statement of Approval     Ordered          06/21/18 1218  I have reviewed and agree with all the recommendations and orders detailed in this document.  EFFECTIVE NOW     Approved and electronically signed by:  Andre Rivera MD             Admission Information     Date & Time Provider Department Dept. Phone    6/18/2018 Junie Hightower MD [facility] [phone]      Your Vitals Were     Blood Pressure Pulse Temperature Respirations Height Last Period    102/62 (BP Location: Left arm) 79 98.1  F (36.7  C) (Oral) 18 1.753 m (5' 9.02\") 09/18/2017    Pulse Oximetry                   98%           [facility] Information     [facility] gives you secure access to your electronic health record. If you see a primary care provider, you can also send messages to your care team and make appointments. If you have questions, please call your primary care clinic.  If you do not have a primary care provider, please call [phone] and they will assist you.        Care EveryWhere ID     This is your Care EveryWhere ID. This could be used by other organizations to access your [facility] medical records  [id number]        Equal Access to Services     JOSE WISDOM : Hadii alda collinso Sobethanyali, waaxda luqadaha, qaybta kaalmada adeegyada, genevieve patel. So [facility] [phone].    ATENCIÓN: Si habla español, tiene a pearce disposición servicios gratuitos de asistencia lingüística. Llame al [phone].    We comply with applicable federal civil rights laws and [place] laws. We do not discriminate on the basis of race, " color, national origin, age, disability, sex, sexual orientation, or gender identity.               Review of your medicines      START taking        Dose / Directions    acetaminophen 325 MG tablet   Commonly known as:  TYLENOL        Dose:  650 mg   Take 2 tablets (650 mg) by mouth every 4 hours as needed for mild pain   Quantity:  100 tablet   Refills:  0       ibuprofen 600 MG tablet   Commonly known as:  ADVIL/MOTRIN        Dose:  600 mg   Take 1 tablet (600 mg) by mouth every 6 hours as needed for other (cramping)   Quantity:  60 tablet   Refills:  0       oxyCODONE IR 5 MG tablet   Commonly known as:  ROXICODONE        Dose:  5-10 mg   Take 1-2 tablets (5-10 mg) by mouth every 4 hours as needed for moderate to severe pain   Quantity:  25 tablet   Refills:  0       polyethylene glycol powder   Commonly known as:  MIRALAX   Used for:  Drug-induced constipation        Dose:  1 capful   Take 17 g (1 capful) by mouth daily   Quantity:  510 g   Refills:  1       senna-docusate 8.6-50 MG per tablet   Commonly known as:  SENOKOT-S;PERICOLACE        Dose:  1-2 tablet   Take 1-2 tablets by mouth 2 times daily   Quantity:  60 tablet   Refills:  0         CONTINUE these medicines which have NOT CHANGED        Dose / Directions    breast pump Misc   Used for:  Disorder of lactation        Dose:  1 each   1 each as needed   Quantity:  1 each   Refills:  0       enoxaparin 120 MG/0.8ML injection   Commonly known as:  LOVENOX   Used for:  Current use of long term anticoagulation, Homozygous Factor V Leiden mutation (H)        Dose:  120 mg   Inject 0.8 mLs (120 mg) Subcutaneous every 12 hours   Quantity:  60 Syringe   Refills:  4       order for DME   Used for:  High-risk pregnancy in third trimester        Maternity Belt order   Quantity:  1 each   Refills:  0       PRENATAL VITAMIN PO        Refills:  0       ranitidine 75 MG tablet   Commonly known as:  ZANTAC   Used for:  Heartburn        Dose:  150 mg   Take 2 tablets  "(150 mg) by mouth 2 times daily   Quantity:  30 tablet   Refills:  3       VITAMIN D (CHOLECALCIFEROL) PO        Dose:  5000 Units   Take 5,000 Units by mouth daily   Refills:  0            Where to get your medicines      These medications were sent to [facility] [place], [place] - [address]  [address], [facility] [address]     Phone:  [phone]     ibuprofen 600 MG tablet    polyethylene glycol powder    senna-docusate 8.6-50 MG per tablet         Some of these will need a paper prescription and others can be bought over the counter. Ask your nurse if you have questions.     Bring a paper prescription for each of these medications     oxyCODONE IR 5 MG tablet       You don't need a prescription for these medications     acetaminophen 325 MG tablet                Protect others around you: Learn how to safely use, store and throw away your medicines at www.disposemymeds.org.        Information about your nerve block     Today you received a block to numb the nerves near your surgery site.    This is a block using local anesthetic or \"numbing\" medication injected around the nerves to anesthetize or \"numb\" the area supplied by those nerves. This block is injected into the muscle layer near your surgical site. The type of anesthesia (Exparel) your anesthesia team used to numb your abdomen may give you relief for up to 72 hours.     Diet: There are no diet restrictions, but you should drink plenty of fluids, unless you are on a fluid-restricted diet.     Activity: If your surgical site is an arm or leg you should be careful with your affected limb, since it is possible to injure your limb without being aware of it due to the numbing. Until full feeling returns, you should guard against bumping or hitting your limb, and avoid extreme hot or cold temperatures on the skin.    Pain Medication: As the block wears off, the feeling will return as a tingling or prickly sensation near your " surgical site. You will experience more discomfort from your incisions as the feeling returns. You may want to take a pain pill (a narcotic or Tylenol if this was prescribed by your surgeon) when you start to experience mild pain, before the pain becomes more severe. If your pain medications do not control your pain, you should notify your surgeon. If you are taking narcotics for pain management, do not drink alcohol, drive a car, or perform hazardous activities.  If you have questions or concerns you may call your surgeon at the number provided with your discharge instructions.     Call your surgeon if you experience blurry vision, ringing in the ears or metallic taste in your mouth.         Information about OPIOIDS     PRESCRIPTION OPIOIDS: WHAT YOU NEED TO KNOW   We gave you an opioid (narcotic) pain medicine. It is important to manage your pain, but opioids are not always the best choice. You should first try all the other options your care team gave you. Take this medicine for as short a time (and as few doses) as possible.     These medicines have risks:    DO NOT drive when on new or higher doses of pain medicine. These medicines can affect your alertness and reaction times, and you could be arrested for driving under the influence (DUI). If you need to use opioids long-term, talk to your care team about driving.    DO NOT operate heave machinery    DO NOT do any other dangerous activities while taking these medicines.     DO NOT drink any alcohol while taking these medicines.      If the opioid prescribed includes acetaminophen, DO NOT take with any other medicines that contain acetaminophen. Read all labels carefully. Look for the word  acetaminophen  or  Tylenol.  Ask your pharmacist if you have questions or are unsure.    You can get addicted to pain medicines, especially if you have a history of addiction (chemical, alcohol or substance dependence). Talk to your care team about ways to reduce this  risk.    Store your pills in a secure place, locked if possible. We will not replace any lost or stolen medicine. If you don t finish your medicine, please throw away (dispose) as directed by your pharmacist. The [place] Pollution Control Agency has more information about safe disposal: https://www.pca.[facility].[place].us/living-green/managing-unwanted-medications.     All opioids tend to cause constipation. Drink plenty of water and eat foods that have a lot of fiber, such as fruits, vegetables, prune juice, apple juice and high-fiber cereal. Take a laxative (Miralax, milk of magnesia, Colace, Senna) if you don t move your bowels at least every other day.              Medication List: This is a list of all your medications and when to take them. Check marks below indicate your daily home schedule. Keep this list as a reference.      Medications           Morning Afternoon Evening Bedtime As Needed    acetaminophen 325 MG tablet   Commonly known as:  TYLENOL   Take 2 tablets (650 mg) by mouth every 4 hours as needed for mild pain   Last time this was given:  975 mg on 6/21/2018  8:53 AM                                   breast pump Misc   1 each as needed                                enoxaparin 120 MG/0.8ML injection   Commonly known as:  LOVENOX   Inject 0.8 mLs (120 mg) Subcutaneous every 12 hours   Last time this was given:  120 mg on 6/21/2018 12:02 PM                                      ibuprofen 600 MG tablet   Commonly known as:  ADVIL/MOTRIN   Take 1 tablet (600 mg) by mouth every 6 hours as needed for other (cramping)   Last time this was given:  800 mg on 6/21/2018  4:41 AM                                   order for [facility]   Maternity Belt order                                oxyCODONE IR 5 MG tablet   Commonly known as:  ROXICODONE   Take 1-2 tablets (5-10 mg) by mouth every 4 hours as needed for moderate to severe pain   Last time this was given:  5 mg on 6/21/2018  8:53 AM                                    polyethylene glycol powder   Commonly known as:  MIRALAX   Take 17 g (1 capful) by mouth daily                                PRENATAL VITAMIN PO                                ranitidine 75 MG tablet   Commonly known as:  ZANTAC   Take 2 tablets (150 mg) by mouth 2 times daily                                senna-docusate 8.6-50 MG per tablet   Commonly known as:  SENOKOT-S;PERICOLACE   Take 1-2 tablets by mouth 2 times daily   Last time this was given:  2 tablets on 6/21/2018  8:53 AM                                VITAMIN D (CHOLECALCIFEROL) PO   Take 5,000 Units by mouth daily

## 2018-06-18 NOTE — IP AVS SNAPSHOT
[facility]    [address] [address]    Phone:  [phone]                                       After Visit Summary   6/18/2018    Benita Olivo    MRN: [id number]           After Visit Summary Signature Page     I have received my discharge instructions, and my questions have been answered. I have discussed any challenges I see with this plan with the nurse or doctor.    ..........................................................................................................................................  Patient/Patient Representative Signature      ..........................................................................................................................................  Patient Representative Print Name and Relationship to Patient    ..................................................               ................................................  Date                                            Time    ..........................................................................................................................................  Reviewed by Signature/Title    ...................................................              ..............................................  Date                                                            Time

## 2018-06-18 NOTE — PLAN OF CARE
Problem: Patient Care Overview  Goal: Plan of Care/Patient Progress Review  Outcome: Improving  Data: Vital signs within normal limits. Postpartum checks within normal limits - see flow record. Patient eating and drinking normally. Patient up to ambulate in the room, has horton in with a total of 125 in 4 hours. No apparent signs of infection. Incision healing well. Patient performing self cares and is able to pump for infant in NICU.  Action: Patient medicated during the shift for pain. See MAR. Patient reassessed within 1 hour after each medication and pain was improved - patient stated she was comfortable. Patient education done about pumping, pain control and self cares. See flow record.  Response: Positive attachment behaviors observed with infant in NICU. Support persons  and family present.   Plan: Anticipate discharge on 6/21.

## 2018-06-18 NOTE — PROVIDER NOTIFICATION
06/18/18 1202   Provider Notification   Provider Name/Title Dr White   Method of Notification Electronic Page   urine output in pacu=50 ml in OR 25 ml. Had 500 ml LR bolus to treat low BP while in PACU.

## 2018-06-19 LAB — HGB BLD-MCNC: 10.4 G/DL (ref 11.7–15.7)

## 2018-06-19 PROCEDURE — 86901 BLOOD TYPING SEROLOGIC RH(D): CPT | Performed by: OBSTETRICS & GYNECOLOGY

## 2018-06-19 PROCEDURE — 36415 COLL VENOUS BLD VENIPUNCTURE: CPT | Performed by: OBSTETRICS & GYNECOLOGY

## 2018-06-19 PROCEDURE — 25000132 ZZH RX MED GY IP 250 OP 250 PS 637: Performed by: STUDENT IN AN ORGANIZED HEALTH CARE EDUCATION/TRAINING PROGRAM

## 2018-06-19 PROCEDURE — 12000028 ZZH R&B OB UMMC

## 2018-06-19 PROCEDURE — 25000128 H RX IP 250 OP 636: Performed by: STUDENT IN AN ORGANIZED HEALTH CARE EDUCATION/TRAINING PROGRAM

## 2018-06-19 PROCEDURE — 85461 HEMOGLOBIN FETAL: CPT | Performed by: OBSTETRICS & GYNECOLOGY

## 2018-06-19 PROCEDURE — 85018 HEMOGLOBIN: CPT | Performed by: STUDENT IN AN ORGANIZED HEALTH CARE EDUCATION/TRAINING PROGRAM

## 2018-06-19 PROCEDURE — 86900 BLOOD TYPING SEROLOGIC ABO: CPT | Performed by: OBSTETRICS & GYNECOLOGY

## 2018-06-19 RX ORDER — AMOXICILLIN 250 MG
1-2 CAPSULE ORAL 2 TIMES DAILY
Qty: 60 TABLET | Refills: 0 | Status: SHIPPED | OUTPATIENT
Start: 2018-06-19 | End: 2018-08-07

## 2018-06-19 RX ORDER — IBUPROFEN 600 MG/1
600 TABLET, FILM COATED ORAL EVERY 6 HOURS PRN
Qty: 60 TABLET | Refills: 0 | Status: SHIPPED | OUTPATIENT
Start: 2018-06-19 | End: 2018-08-07

## 2018-06-19 RX ORDER — OXYCODONE HYDROCHLORIDE 5 MG/1
5-10 TABLET ORAL EVERY 4 HOURS PRN
Qty: 25 TABLET | Refills: 0 | Status: SHIPPED | OUTPATIENT
Start: 2018-06-19 | End: 2018-08-07

## 2018-06-19 RX ORDER — ACETAMINOPHEN 325 MG/1
650 TABLET ORAL EVERY 4 HOURS PRN
Qty: 100 TABLET
Start: 2018-06-21 | End: 2019-07-19

## 2018-06-19 RX ADMIN — OXYCODONE HYDROCHLORIDE 10 MG: 5 TABLET ORAL at 23:02

## 2018-06-19 RX ADMIN — ENOXAPARIN SODIUM 120 MG: 120 INJECTION SUBCUTANEOUS at 10:45

## 2018-06-19 RX ADMIN — SENNOSIDES AND DOCUSATE SODIUM 2 TABLET: 8.6; 5 TABLET ORAL at 10:45

## 2018-06-19 RX ADMIN — ENOXAPARIN SODIUM 120 MG: 120 INJECTION SUBCUTANEOUS at 23:02

## 2018-06-19 RX ADMIN — ACETAMINOPHEN 975 MG: 325 TABLET, FILM COATED ORAL at 14:11

## 2018-06-19 RX ADMIN — ACETAMINOPHEN 975 MG: 325 TABLET, FILM COATED ORAL at 23:02

## 2018-06-19 RX ADMIN — OXYCODONE HYDROCHLORIDE 5 MG: 5 TABLET ORAL at 17:24

## 2018-06-19 RX ADMIN — OXYCODONE HYDROCHLORIDE 10 MG: 5 TABLET ORAL at 05:09

## 2018-06-19 RX ADMIN — IBUPROFEN 800 MG: 800 TABLET, FILM COATED ORAL at 18:49

## 2018-06-19 RX ADMIN — ACETAMINOPHEN 975 MG: 325 TABLET, FILM COATED ORAL at 06:32

## 2018-06-19 RX ADMIN — OXYCODONE HYDROCHLORIDE 5 MG: 5 TABLET ORAL at 14:11

## 2018-06-19 RX ADMIN — OXYCODONE HYDROCHLORIDE 5 MG: 5 TABLET ORAL at 10:45

## 2018-06-19 RX ADMIN — IBUPROFEN 800 MG: 800 TABLET, FILM COATED ORAL at 05:11

## 2018-06-19 RX ADMIN — SENNOSIDES AND DOCUSATE SODIUM 2 TABLET: 8.6; 5 TABLET ORAL at 23:02

## 2018-06-19 NOTE — CONSULTS
[facility]  MATERNAL CHILD HEALTH   SOCIAL WORK PROGRESS NOTE    Received order due to NICU admission. Completed chart review. Benita is a 37 year old  who gave birth to baby girl Deisy on 18. Baby was admitted to the NICU due to hypoglycemia. FOB/, Rigo is involved and supportive. This writer briefly checked in with parents bedside in the NICU to offer support and resources. Parents denied having any questions, concerns, or resource needs at this time. Parents are hopeful (as is the medical team) that baby will be able to transfer to [facility] prior to mom being discharged. No social work needs indicated at this time. This writer provided parents with their contact information and encouraged them to utilize this writer as needed. Parents anticipate If baby does not transfer back to [facility] prior to mom being discharged they will have questions for this writer. Mom did ask about community programs that offer donor breast milk. This writer consulted with lactation consultant who plans to follow up with mom. Please re-consult social work should additional needs arise.       ANDRIY Swartz, [facility]   Social Worker  Maternal Child Health   Phone: [phone]  Pager: [phone]

## 2018-06-19 NOTE — PROGRESS NOTES
Post Partum Progress Note  PPD#1    Subjective:  Doing well this morning, resting comfortably. About to pump. She was able to eat dinner last night. She isn't ambulating very much, but has been doing so without issue. She still has her horton in place, with plan to remove it this morning. She has not yet passed gas. Plans to breast feed, currently using supplementation due to trouble with infant blood glucose    Objective:  Vitals:    18 1800 18 2200 18 0245 18 0600   BP: 97/57 99/57 95/61 104/68   BP Location:       Pulse: 90 87 83 79   Resp:    Temp: 97.8  F (36.6  C) 98.6  F (37  C)  97.7  F (36.5  C)   TempSrc: Oral Oral  Oral   SpO2: 97% 97% 98% 97%   Height:           General: NAD. A&Ox3.  CV: Regular rate, well perfused.   Pulm: Normal respiratory effort.  Abd: Soft, non-tender, non-distended. Fundus is firm and at the umbilicus.    Incision: incision is clean, dry, intact  Ext: 1+ lower extremity edema bilaterally. No calf tenderness.    Assessment/Plan:  Benita Olivo is a 37 year old  female who is POD#1 s/p PLTCS for LGA (projected >5000g EFW). She is doing well postoperatively    # Homozygous Factor V Leiden, hx DVT, PE   - On lovenox 120mg BID   - Hgb stable evening of POD#0, restarted lovenox   - Plan to decrease lovenox back to 100mg BID when pregnancy fluid mobilized, plan to f/u with hematology postpartum     - Encourage routine post-operative goals including ambulation and incentive spirometry  - PNC: Rh neg. Pending rhogam eval Rubella immune.  - Pain: controlled on oral medications - Tylenol and Ibuprofen scheduled, PRN oxycodone  - Heme: Hgb 13.0>>10.5. Will discharge home with iron  - GI: continue anti-emetics and stool softeners as needed.  - : Horton in place, plan to dc this morning  - Infant: Stable in NICU, working on blood sugar monitoring  - Feeding: Plans on breastfeeding, pumping for infant now  - BC: declines for now    Discharge to  home on POD#2-3, when meeting postoperative goals    # Pain Assessment:  Current Pain Score 6/19/2018   Patient currently in pain? denies   Pain score (0-10) -   Pain location -   Pain descriptors -   - Benita is experiencing pain intermittently due to c/s. Pain management was discussed and the plan was created in a collaborative fashion.  Benita's response to the current recommendations: engaged  - Opioid regimen: oxycodone  - Response to opioid medications: Reduction of symptoms  - Bowel regimen: senna and docusate  - Pharmacologic adjuvants: NSAIDs and Acetaminophen  - Non-pharmacologic adjuvants: Heat and Ice    Tory Mcfadden MD  Obstetrics and Gyncology, PGY-2  June 19, 2018 , 6:44 AM         [facility] staff:  Appreciate note by Dr. Mcfadden.  I have seen and examined the patient without the resident. I have reviewed, edited, and agree with the note.    Patient has since voided spontaneously and also reports passage of flatus now this morning. Feeling well. Continue routine postoperative cares.     Rosina Leos MD  6/19/2018  11:34 AM

## 2018-06-19 NOTE — LACTATION NOTE
D:  I met with Benita at bedside today.  Deisy is her second baby.  Her first was very difficult to latch (did not until 2 months old), was thought to have ankyloglossia by an LC, but did not have it released.  She went on to breastfeed for 10 months.  Benita has Factor V Leiden, with a history of PE and DVT, is on Lovenox.  She has no history of breast/chest surgery or trauma, has already started to pump.  I:  I assisted her in putting Deisy to breast.  She was very irritable and needed frequent calming, did latch and suck a bit, but ended up finger feeding.  I was not able to do an oral assessment due to her irritability.  I gave Benita a folder of introductory materials and went over pumping guidelines.  She has already received a new Pump in Style through her insurer.  A:  Baby irritable, but able to latch and then finger feed.  P:  Goal to transfer back to [facility] if hypoglycemia resolves.     Chhaya Tovar, RNC, IBCLC

## 2018-06-19 NOTE — PLAN OF CARE
Problem: Patient Care Overview  Goal: Plan of Care/Patient Progress Review  Outcome: Therapy, progress toward functional goals as expected  VSS. Incisional pain well controlled with Tylenol and Oxycodone. Pt ambulating without difficulty. Pumping every 3 hours for infant daughter in the NICU, getting small puddles with each pumping session.  Pt voiding without difficulty post horton catheter removal. Postpartum checks WNL. Pt planning on taking a shower this afternoon and removing her dressing. Spouse at bedside. Attentive and supporting. Pt and spouse going to visit infant in the NICU.

## 2018-06-19 NOTE — PLAN OF CARE
Problem: Patient Care Overview  Goal: Plan of Care/Patient Progress Review  Data: Vital signs within normal limits. Postpartum checks within normal limits - see flow record. Patient eating and drinking normally. Patient will have Colmenares removed this AM and has already been out of bed multiple times with minimal assist. No apparent signs of infection. Incision healing well, dressing CDI. Patient performing self cares and is visiting infant in NICU overnight. IV infiltrated overnight; no IV access but tolerating PO and good urine output once pitocin complete.  Action: Patient medicated during the shift for pain. See MAR. Patient reassessed within 1 hour after each medication and pain was improved - patient stated she was comfortable. Patient education done about pumping frequency, hand expression. See flow record.  Response: Visiting infant in NICU, mom bringing pumped breastmilk for infant with hypoglycemia issues. Support persons  Will present.   Plan: Anticipate discharge POD 2-3.

## 2018-06-20 PROCEDURE — 12000028 ZZH R&B OB UMMC

## 2018-06-20 PROCEDURE — 25000132 ZZH RX MED GY IP 250 OP 250 PS 637: Performed by: STUDENT IN AN ORGANIZED HEALTH CARE EDUCATION/TRAINING PROGRAM

## 2018-06-20 PROCEDURE — 25000128 H RX IP 250 OP 636: Performed by: STUDENT IN AN ORGANIZED HEALTH CARE EDUCATION/TRAINING PROGRAM

## 2018-06-20 RX ADMIN — SENNOSIDES AND DOCUSATE SODIUM 2 TABLET: 8.6; 5 TABLET ORAL at 08:09

## 2018-06-20 RX ADMIN — OXYCODONE HYDROCHLORIDE 5 MG: 5 TABLET ORAL at 08:08

## 2018-06-20 RX ADMIN — IBUPROFEN 800 MG: 800 TABLET, FILM COATED ORAL at 14:23

## 2018-06-20 RX ADMIN — ENOXAPARIN SODIUM 120 MG: 120 INJECTION SUBCUTANEOUS at 22:18

## 2018-06-20 RX ADMIN — ACETAMINOPHEN 975 MG: 325 TABLET, FILM COATED ORAL at 16:03

## 2018-06-20 RX ADMIN — IBUPROFEN 800 MG: 800 TABLET, FILM COATED ORAL at 08:09

## 2018-06-20 RX ADMIN — OXYCODONE HYDROCHLORIDE 5 MG: 5 TABLET ORAL at 17:51

## 2018-06-20 RX ADMIN — OXYCODONE HYDROCHLORIDE 5 MG: 5 TABLET ORAL at 14:23

## 2018-06-20 RX ADMIN — IBUPROFEN 800 MG: 800 TABLET, FILM COATED ORAL at 01:09

## 2018-06-20 RX ADMIN — OXYCODONE HYDROCHLORIDE 5 MG: 5 TABLET ORAL at 11:07

## 2018-06-20 RX ADMIN — OXYCODONE HYDROCHLORIDE 10 MG: 5 TABLET ORAL at 04:55

## 2018-06-20 RX ADMIN — ENOXAPARIN SODIUM 120 MG: 120 INJECTION SUBCUTANEOUS at 11:01

## 2018-06-20 RX ADMIN — SENNOSIDES AND DOCUSATE SODIUM 2 TABLET: 8.6; 5 TABLET ORAL at 20:48

## 2018-06-20 RX ADMIN — ACETAMINOPHEN 975 MG: 325 TABLET, FILM COATED ORAL at 08:09

## 2018-06-20 RX ADMIN — OXYCODONE HYDROCHLORIDE 5 MG: 5 TABLET ORAL at 20:47

## 2018-06-20 RX ADMIN — HUMAN RHO(D) IMMUNE GLOBULIN 300 MCG: 300 INJECTION, SOLUTION INTRAMUSCULAR at 22:51

## 2018-06-20 RX ADMIN — IBUPROFEN 800 MG: 800 TABLET, FILM COATED ORAL at 20:47

## 2018-06-20 NOTE — LACTATION NOTE
"This note was copied from a baby's chart.  D:  I met with Benita to work on a pumping; she had questions about comfort and other concerns.  I:  I watched her pump and moved her to 30mm flanges which were more comfortable.  We discussed skin care.  She stated she had a low supply with her 1st and was considering taking fenugreek (I did not dive into the details of her low supply).  We reviewed how supply issues in the past are not always indicative of future supply issues, that her supply is within normal for day 2 (baby is nursing, plus she can pump puddles of colostrum), non-herbal ways to increase supply (skin to skin, frequent effective feedings).  We reviewed when to pump (after feeds, if babe is still needing to be supplemented), when not to pump (no longer needing supplements).  We discussed appropriateness of taking fenugreek when on blood thinners (not recommended).  She asked about pumping for going back to work; we reviewed laws and guidelines for pumping at work, resources for videos on paced bottlefeeding techniques while at  (she was unaware of the technique), and how to make a small \"stash\" of EBM without excessive pumping and spending time away from her baby.  We did not specifically work on hand expression although she was familiar with it and had been doing so.  Prior to meeting with her I did a brief oral exam of kirti per staff request; no palpable frenulum felt, babe able to suck on gloved finger, fair extension and cupping.  A:  Mom's questions answered and she is able to pump comfortably.  P:  Will continue to provide lactation support.    Karen Ware, RNC, IBCLC    "

## 2018-06-20 NOTE — PLAN OF CARE
Problem: Patient Care Overview  Goal: Plan of Care/Patient Progress Review  Outcome: Therapy, progress toward functional goals as expected  VSS. Pain well controlled with Tylenol, Ibuprofen and Oxycodone.  Pt pumping every three hours. Infant just transferred back from NICU and had one breastfeeding session with good latch noted. Pt will continue to pump or hand express to provide extra colostrum for infant. Postpartum checks WNL. Pt voiding and passing flatus without problems. Ambulating without difficulty. Pt and spouse bonding with baby. Pt providing skin to skin with baby.

## 2018-06-20 NOTE — PROGRESS NOTES
Post Partum Progress Note  PPD#2    Subjective:  Benita is doing well this morning. She is pumping this morning. Notes that her nipples are sore with pumping and latch and wants to work on this today. She is taking less oxycodone than yesterday, pain is tolerable. She is ambulating around her room. Voiding spontaneously, passing flatus, but not yet had a bowel movement. Bleeding is minimal and decreasing. Infant is still being monitored for blood glucose, but doing better, hoping to have baby transferred to room today    Objective:  Vitals:    18 0600 18 1035 18 1730 18 0015   BP: 104/68 93/58 96/64 104/71   BP Location:       Pulse: 79   82   Resp:  16   Temp: 97.7  F (36.5  C) 97.9  F (36.6  C) 98.1  F (36.7  C) 98.7  F (37.1  C)   TempSrc: Oral Oral Oral Oral   SpO2: 97% 98%  96%   Height:           General: NAD. A&Ox3.  CV: Regular rate, well perfused.   Pulm: Normal respiratory effort.  Abd: Soft, non-tender, non-distended. Fundus is firm and 2cm below umbilicus  Incision: incision is clean, dry, intact, steri strips in place. Appears to have a little bit of skin irritation from where bandage was removed  Ext: 1+ lower extremity edema bilaterally. No calf tenderness.    Assessment/Plan:  Benita Olivo is a 37 year old  female who is POD#2 s/p PLTCS for LGA (projected >5000g EFW). She is doing well postoperatively    # Homozygous Factor V Leiden, hx DVT, PE   - On lovenox 120mg BID   - Hgb stable evening of POD#0, restarted lovenox   - Plan to decrease lovenox back to 100mg BID when pregnancy fluid mobilized, plan to f/u with hematology postpartum     - Encourage routine post-operative goals including ambulation and incentive spirometry  - PNC: Rh neg. With RH + baby, rhogam eval is pending for dosing. Rubella immune.  - Pain: controlled on oral medications - Tylenol and Ibuprofen scheduled, PRN oxycodone  - Heme: Hgb 13.0>>10.5. Will discharge home with iron  -  GI: continue anti-emetics and stool softeners as needed.  - : s/p horton, voiding spontaneously  - Infant: Stable in NICU, working on blood sugar monitoring  - Feeding: Plans on breastfeeding, pumping for infant now  - BC: declines for now due to FVLeiden    Discharge to home on POD#2-3, when meeting postoperative goals. Goals for today - ambulation, and help with latch    # Pain Assessment:  Current Pain Score 6/20/2018   Patient currently in pain? yes   Pain score (0-10) -   Pain location -   Pain descriptors -   - Benita is experiencing pain intermittently due to c/s. Pain management was discussed and the plan was created in a collaborative fashion.  Benita's response to the current recommendations: engaged  - Opioid regimen: oxycodone, but taking less than yesterday  - Response to opioid medications: Reduction of symptoms  - Bowel regimen: senna and docusate  - Pharmacologic adjuvants: NSAIDs and Acetaminophen  - Non-pharmacologic adjuvants: Heat and Ice    Tory Mcfadden MD  Obstetrics and Gyncology, PGY-2  June 19, 2018 , 6:53 AM     [facility] staff:  Appreciate note by Dr. Mcfadden.  I have seen and examined the patient without the resident. I have reviewed, edited, and agree with the note.        Monica Rothman MD, FACOG  6/20/2018  8:59 AM

## 2018-06-20 NOTE — PLAN OF CARE
Problem: Patient Care Overview  Goal: Plan of Care/Patient Progress Review  Outcome: Improving  VSS and postpartum assessments WDL.  Up ad joana with steady gait.  Independent with cares.  Pt planning to shower this evening, encouraged pt to call this writer to assess incision after removal of bandage in the shower.  Pt has spent much of this shift down in the NICU.  Pumping independently and bringing expressed colostrum down to infant in NICU.  Pain managed with tylenol, ibuprofen and oxycodone per MAR.  , Will present and supportive.  Birth certificate completed, EDS reviewed and placed at bedside.  Will continue with postpartum cares and education per plan of care.

## 2018-06-20 NOTE — PLAN OF CARE
Problem: Patient Care Overview  Goal: Plan of Care/Patient Progress Review  Outcome: Improving  VSS. Postpartum assessment WDL. Pain well controlled with pain medications. Pt pumping independently. Pt down to visit baby in NICU overnight

## 2018-06-21 ENCOUNTER — ANTICOAGULATION THERAPY VISIT (OUTPATIENT)
Dept: ANTICOAGULATION | Facility: CLINIC | Age: 38
End: 2018-06-21

## 2018-06-21 VITALS
TEMPERATURE: 98.1 F | HEIGHT: 69 IN | DIASTOLIC BLOOD PRESSURE: 62 MMHG | OXYGEN SATURATION: 98 % | RESPIRATION RATE: 18 BRPM | SYSTOLIC BLOOD PRESSURE: 102 MMHG | HEART RATE: 79 BPM

## 2018-06-21 DIAGNOSIS — D68.51 HOMOZYGOUS FACTOR V LEIDEN MUTATION (H): ICD-10-CM

## 2018-06-21 DIAGNOSIS — I82.409 DEEP VEIN THROMBOSIS (DVT) (H): ICD-10-CM

## 2018-06-21 DIAGNOSIS — Z79.01 LONG-TERM (CURRENT) USE OF ANTICOAGULANTS: ICD-10-CM

## 2018-06-21 LAB
ABO + RH BLD: NORMAL
ABO + RH BLD: NORMAL
BLOOD BANK CMNT PATIENT-IMP: NORMAL
DATE RH IMM GL GVN: NORMAL
FETAL CELL SCN BLD QL ROSETTE: NORMAL
RH IG VIALS RECOM PATIENT: NORMAL

## 2018-06-21 PROCEDURE — 25000132 ZZH RX MED GY IP 250 OP 250 PS 637: Performed by: STUDENT IN AN ORGANIZED HEALTH CARE EDUCATION/TRAINING PROGRAM

## 2018-06-21 PROCEDURE — 25000128 H RX IP 250 OP 636: Performed by: STUDENT IN AN ORGANIZED HEALTH CARE EDUCATION/TRAINING PROGRAM

## 2018-06-21 RX ORDER — POLYETHYLENE GLYCOL 3350 17 G/17G
1 POWDER, FOR SOLUTION ORAL DAILY
Qty: 510 G | Refills: 1 | Status: SHIPPED | OUTPATIENT
Start: 2018-06-21 | End: 2018-08-07

## 2018-06-21 RX ADMIN — IBUPROFEN 800 MG: 800 TABLET, FILM COATED ORAL at 13:20

## 2018-06-21 RX ADMIN — ACETAMINOPHEN 975 MG: 325 TABLET, FILM COATED ORAL at 00:45

## 2018-06-21 RX ADMIN — ACETAMINOPHEN 975 MG: 325 TABLET, FILM COATED ORAL at 08:53

## 2018-06-21 RX ADMIN — SENNOSIDES AND DOCUSATE SODIUM 2 TABLET: 8.6; 5 TABLET ORAL at 08:53

## 2018-06-21 RX ADMIN — OXYCODONE HYDROCHLORIDE 5 MG: 5 TABLET ORAL at 13:20

## 2018-06-21 RX ADMIN — IBUPROFEN 800 MG: 800 TABLET, FILM COATED ORAL at 04:41

## 2018-06-21 RX ADMIN — OXYCODONE HYDROCHLORIDE 10 MG: 5 TABLET ORAL at 00:46

## 2018-06-21 RX ADMIN — ENOXAPARIN SODIUM 120 MG: 120 INJECTION SUBCUTANEOUS at 12:02

## 2018-06-21 RX ADMIN — OXYCODONE HYDROCHLORIDE 5 MG: 5 TABLET ORAL at 08:53

## 2018-06-21 RX ADMIN — OXYCODONE HYDROCHLORIDE 5 MG: 5 TABLET ORAL at 04:41

## 2018-06-21 NOTE — PROGRESS NOTES
Dates of hospitalization: 6/18 to 6/21  Reason for hospitalization: Maternity care    Procedures performed: Caesarian Section  Vitamin K or FFP administered? None  Inpatient warfarin doses added to calendar? N/A  Medication changes at discharge: Tylenol 650mg Q 4 Hours PRN, Ibuprofen 600mg Q 6 Hours PRN, Oxycodone 5-10mg Q 4 Hours PRN, Miralax 17g Daily, and Senna 1-2 Tablets BID  Warfarin dosing after DC: N/A  Patient discharged on Lovenox? Continue Lovenox 120mg Sub-Q Q 12 Hours   Next INR date: N/A  Where is the patient discharging to? (home, TCU, staying locally, etc.): Home with   Will patient have home care? No    Note sent to pool to f/u with pt about restarting Warfarin or to look to see a note from Hematology

## 2018-06-21 NOTE — PLAN OF CARE
Home care referral sent to [facility] for early discharge.

## 2018-06-21 NOTE — PLAN OF CARE
Problem: Patient Care Overview  Goal: Plan of Care/Patient Progress Review  Outcome: Adequate for Discharge Date Met: 06/21/18  VSS. Pain well controlled with Tylenol, Ibuprofen, and Oxycodone. Pt breastfeeding baby on cue, good latch noted. Also supplementing baby with EBM via finger feeding. Postpartum checks WNL. Pt ready for discharge to home. Discharge instructions and medications reviewed with patient. Pt aware of need for follow up appointments. Pt and spouse verbalized understanding of discharge instructions and medications. Baby bands double checked. Expressed breast milk that was stored in the fridge was verified and given to patient to take home. Pt and family discharged to home, all belongings taken home.

## 2018-06-21 NOTE — PROGRESS NOTES
Post Partum Progress Note  PPD#3    Subjective:  Benita is doing well this morning, resting. Notes her pain is improving, still sore with turning, but doing much better. She is ambulating and voiding without problems. She is passing flatus, but still not had a BM and is somewhat concerned about this. Bleeding is stable. Infant is now in her room and she is looking forward to going home.    Objective:  Vitals:    18 0015 18 0800 18 1600 18 0046   BP: 104/71 101/76 113/68 113/66   BP Location:       Pulse: 82      Resp: 16 18 16 18   Temp: 98.7  F (37.1  C) 97.8  F (36.6  C) 98  F (36.7  C) 98.7  F (37.1  C)   TempSrc: Oral Oral Oral Oral   SpO2: 96%   97%   Height:           General: NAD. A&Ox3.  CV: Regular rate, well perfused.   Pulm: Normal respiratory effort.  Abd: Soft, non-tender, non-distended. Fundus is firm and 3cm below umbilicus  Incision: incision is clean, dry, intact, steri strips in place.  Ext: 1+ lower extremity edema bilaterally. No calf tenderness.    Assessment/Plan:  Benita Olivo is a 37 year old  female who is POD#3 s/p PLTCS for LGA (projected >5000g EFW). She is doing well postoperatively    # Homozygous Factor V Leiden, hx DVT, PE   - On lovenox 120mg BID   - Hgb stable evening of POD#0, restarted lovenox   - Plan to d/c on 120mg BID and to f/u with hematology postpartum for followup and lovenox plan    - Encourage routine post-operative goals including ambulation and incentive spirometry  - PNC: Rh neg. With RH + baby, s/p rhogam for dosing. Rubella immune.  - Pain: controlled on oral medications - Tylenol and Ibuprofen scheduled, PRN oxycodone  - Heme: Hgb 13.0>>10.5.   - GI: continue anti-emetics and stool softeners as needed. Added miralax per patient request to discharge meds  - : s/p horton, voiding spontaneously  - Infant: Stable in room  - Feeding: Plans on breastfeeding, pumping for infant now  - BC: declines for now due to  MATTLeiden    Discharge to home today POD#3, as she is meeting all her postpartum goals. Plan to f/u with hematologist in next 1-2wk    # Pain Assessment:  Current Pain Score 6/21/2018   Patient currently in pain? yes   Pain score (0-10) -   Pain location -   Pain descriptors -   - Benita is experiencing pain intermittently due to c/s. Pain management was discussed and the plan was created in a collaborative fashion.  Benita's response to the current recommendations: engaged  - Opioid regimen: oxycodone, but taking less than yesterday  - Response to opioid medications: Reduction of symptoms  - Bowel regimen: senna and docusate  - Pharmacologic adjuvants: NSAIDs and Acetaminophen  - Non-pharmacologic adjuvants: Heat and Ice    Tory Mcfadden MD  Obstetrics and Gyncology, PGY-2  6/21/2018 7:50 AM     Physician Attestation   I, Andre Rivera, saw this patient with the resident and agree with the resident s findings and plan of care as documented in the resident s note.      I personally reviewed vital signs and medications.    Key findings: patient plans for discharge on enoxaparin at therapeutic dose for pregnancy. Plan by hematology is to continue with twice a day dosing until follow up in 5-7 days, at which time patient will transition back to coumadin. I have advised patient that if she notices an increase in vaginal flow, to notify ob or hematology to decrease enoxaparin dose to once a day.    Andre Rivera  Date of Service (when I saw the patient): 06/21/18    Time Spent on this Encounter   IAndre, spent a total of 10 minutes bedside and on the inpatient unit today managing the care of Benita Olivo.  Over 50% of my time on the unit was spent counseling the patient and /or coordinating care regarding postoperative care. See note for details.

## 2018-06-21 NOTE — PLAN OF CARE
Problem: Patient Care Overview  Goal: Plan of Care/Patient Progress Review  Outcome: Improving  VSS. Postpartum assessment WDL. Pain well controlled with tylenol, ibuprofen and oxy. Breastfeeding, finger feeding, pumping independently. Bonding well with

## 2018-06-21 NOTE — PLAN OF CARE
Problem: Patient Care Overview  Goal: Plan of Care/Patient Progress Review  Outcome: Improving  VSS and postpartum assessments WDL.  Up ad joana with steady gait.  Independent with cares.  Bonding well with infant.  Breastfeeding on cue with minimal assist for latching when infant fussy at breast, mother pumping and supplementing infant via fingerfeeding with 10mls pumped breastmilk after feedings.  **Milk stored in Nursery fridge.**  Pain managed with tylenol, ibuprofen and oxycodone per MAR.  , Will present and supportive.  Will continue with postpartum cares and education per plan of care.

## 2018-06-21 NOTE — MR AVS SNAPSHOT
Benita Olivo   6/21/2018   Anticoagulation Therapy Visit    Description:  38 year old female   Provider:  Noemi Morgan, RN   Department:  [facility]           INR as of 6/21/2018     Today's INR       Anticoagulation Summary as of 6/21/2018     INR goal 2.0-3.0   Today's INR    Next INR check     Indications   Deep vein thrombosis (DVT) (H) [I82.409] [I82.409]  Long-term (current) use of anticoagulants [Z79.01] [Z79.01]  Homozygous Factor V Leiden mutation (H) [D68.51]         June 2018 Details    Sun Mon Tue Wed Thu Fri Sat          1               2                 3               4               5               6               7               8               9                 10               11               12               13               14               15         See details      16                 17               18            19               20               21               22               23                 24               25               26               27               28               29               30                Date Details   06/15 This INR check       Date of next INR:  6/18/2018

## 2018-06-21 NOTE — LACTATION NOTE
This note was copied from a baby's chart.  Follow up (seen by NICU LC's x 2)    Benita's milk is coming in and family is planning to discharge today. Baby Deisy was latched to the left breast and appeared to have an effective, asymmetric latch. Benita denies discomfort and Deisy was heard gulping milk at the breast. When she came off the breast the nipple appeared rounded and intact. Benita reports that Deisy is sometimes harder to latch on the left breast and when she pumps she is seeing more volume from the right breast; she's wondering if she should be concerned about this. Upon further questioning, the milk volumes are similar between the breasts, but she is able to express a bit more from the right. She is finding it easier to latch Deisy to the left breast as well.     Encouraged Benita to continue to attempt feeding on the left breast. She was independently able to latch infant to the left breast. Explained normal variation between breasts, but encouraged Benita to follow up with an LC outpatient if she starts to notice a significant difference in milk volumes. Reviewed signs feedings are going well, satiety cues, nutritive vs non-nutritive suck and outpatient LC resources.    Family plans to discharge today and will follow up a [facility] on Saturday.

## 2018-06-25 ENCOUNTER — TELEPHONE (OUTPATIENT)
Dept: PEDIATRICS | Facility: CLINIC | Age: 38
End: 2018-06-25

## 2018-06-25 DIAGNOSIS — O92.70 LACTATION DISORDER: Primary | ICD-10-CM

## 2018-06-25 RX ORDER — MUPIROCIN 20 MG/G
OINTMENT TOPICAL
Qty: 30 G | Refills: 1 | Status: SHIPPED | OUTPATIENT
Start: 2018-06-25 | End: 2019-05-24

## 2018-07-11 ENCOUNTER — ANTICOAGULATION THERAPY VISIT (OUTPATIENT)
Dept: ANTICOAGULATION | Facility: CLINIC | Age: 38
End: 2018-07-11

## 2018-07-11 DIAGNOSIS — Z79.01 LONG-TERM (CURRENT) USE OF ANTICOAGULANTS: ICD-10-CM

## 2018-07-11 DIAGNOSIS — D68.51 HOMOZYGOUS FACTOR V LEIDEN MUTATION (H): ICD-10-CM

## 2018-07-11 DIAGNOSIS — I82.409 DEEP VEIN THROMBOSIS (DVT) (H): ICD-10-CM

## 2018-07-11 NOTE — PROGRESS NOTES
Spoke with pt and she reports she hasn't restarted her Warfarin and is currently doing Lovenox 120mg Q 12 Hours. Pt reports she will contact us when she decides to restart her Warfarin. Note sent to pool to f/u if no call received

## 2018-07-11 NOTE — MR AVS SNAPSHOT
Benita Olivo   7/11/2018   Anticoagulation Therapy Visit    Description:  38 year old female   Provider:  Noemi Morgan, RN   Department:  [facility]           INR as of 7/11/2018     Today's INR       Anticoagulation Summary as of 7/11/2018     INR goal 2.0-3.0   Today's INR    Next INR check     Indications   Deep vein thrombosis (DVT) (H) [I82.409] [I82.409]  Long-term (current) use of anticoagulants [Z79.01] [Z79.01]  Homozygous Factor V Leiden mutation (H) [D68.51]         June 2018 Details    Sun Mon Tue Wed Thu Fri Sat          1               2                 3               4               5               6               7               8               9                 10               11               12               13               14               15         See details      16                 17               18            19               20               21               22               23                 24               25               26               27               28               29               30                Date Details   06/15 This INR check       Date of next INR:  6/18/2018

## 2018-08-07 ENCOUNTER — OFFICE VISIT (OUTPATIENT)
Dept: OBGYN | Facility: CLINIC | Age: 38
End: 2018-08-07
Attending: OBSTETRICS & GYNECOLOGY
Payer: COMMERCIAL

## 2018-08-07 VITALS
WEIGHT: 221.9 LBS | HEIGHT: 69 IN | SYSTOLIC BLOOD PRESSURE: 102 MMHG | DIASTOLIC BLOOD PRESSURE: 70 MMHG | HEART RATE: 72 BPM | BODY MASS INDEX: 32.87 KG/M2

## 2018-08-07 DIAGNOSIS — M54.50 ACUTE MIDLINE LOW BACK PAIN WITHOUT SCIATICA: ICD-10-CM

## 2018-08-07 NOTE — MR AVS SNAPSHOT
After Visit Summary   8/7/2018    Benita Olivo    MRN: [id number]           Patient Information     Date Of Birth          1980        Visit Information        Provider Department      8/7/2018 9:15 AM Rosina Leos MD [facility]        Today's Diagnoses     Routine postpartum follow-up    -  1    Acute midline low back pain without sciatica           Follow-ups after your visit        Additional Services     ORTHOPEDICS ADULT REFERRAL       Your provider has referred you to: [facility]: Orthopaedic Clinic [facility] [phone]   [email or web address]      Please be aware that coverage of these services is subject to the terms and limitations of your health insurance plan.  Call member services at your health plan with any benefit or coverage questions.      Please bring the following to your appointment:    >>   Any x-rays, CTs or MRIs which have been performed.  Contact the facility where they were done to arrange for  prior to your scheduled appointment.    >>   List of current medications   >>   This referral request   >>   Any documents/labs given to you for this referral                  Who to contact     Please call your clinic at [phone] to:    Ask questions about your health    Make or cancel appointments    Discuss your medicines    Learn about your test results    Speak to your doctor            Additional Information About Your Visit        [employer] Information     [employer] gives you secure access to your electronic health record. If you see a primary care provider, you can also send messages to your care team and make appointments. If you have questions, please call your primary care clinic.  If you do not have a primary care provider, please call [phone] and they will assist you.      [employer] is an electronic gateway that provides easy, online access to your medical records. With [employer], you can request  "a clinic appointment, read your test results, renew a prescription or communicate with your care team.     To access your existing account, please contact your [facility] or call [phone] for assistance.        Care EveryWhere ID     This is your Care EveryWhere ID. This could be used by other organizations to access your [place] medical records  [id number]        Your Vitals Were     Pulse Height Last Period BMI (Body Mass Index)          72 1.753 m (5' 9.02\") 09/18/2017 32.75 kg/m2         Blood Pressure from Last 3 Encounters:   08/07/18 102/70   06/21/18 102/62   06/11/18 105/73    Weight from Last 3 Encounters:   08/07/18 100.7 kg (221 lb 14.4 oz)   06/11/18 111.4 kg (245 lb 11.2 oz)   06/05/18 110.7 kg (244 lb)              We Performed the Following     ORTHOPEDICS ADULT REFERRAL          Today's Medication Changes          These changes are accurate as of 8/7/18 10:05 AM.  If you have any questions, ask your nurse or doctor.               Stop taking these medicines if you haven't already. Please contact your care team if you have questions.     breast pump Misc   Stopped by:  Rosina Leos MD           ibuprofen 600 MG tablet   Commonly known as:  ADVIL/MOTRIN   Stopped by:  Rosina Leos MD           order for DME   Stopped by:  Rosina Leos MD           oxyCODONE IR 5 MG tablet   Commonly known as:  ROXICODONE   Stopped by:  Rosina Leos MD           polyethylene glycol powder   Commonly known as:  MIRALAX   Stopped by:  Rosina Leos MD           ranitidine 75 MG tablet   Commonly known as:  ZANTAC   Stopped by:  Rosina Leos MD           senna-docusate 8.6-50 MG per tablet   Commonly known as:  SENOKOT-S;PERICOLACE   Stopped by:  Rosina Leos MD                    Primary Care Provider Fax #    Physician No Ref-Primary [phone]       No address on file        Equal Access to Services     MISAEL WISDOM AH: Hadii aad ku " octavio Mcconnell, ede poonduarte, jannta kashayne morris, genevieve genin hayaan twannannette kevincheryl lalisasven amanda. So [facility] [phone].    ATENCIÓN: Si habla dusty, tiene a pearce disposición servicios gratuitos de asistencia lingüística. Mily al [phone].    We comply with applicable federal civil rights laws and [place] laws. We do not discriminate on the basis of race, color, national origin, age, disability, sex, sexual orientation, or gender identity.            Thank you!     Thank you for choosing [facility]  for your care. Our goal is always to provide you with excellent care. Hearing back from our patients is one way we can continue to improve our services. Please take a few minutes to complete the written survey that you may receive in the mail after your visit with us. Thank you!             Your Updated Medication List - Protect others around you: Learn how to safely use, store and throw away your medicines at www.disposemymeds.org.          This list is accurate as of 18 10:05 AM.  Always use your most recent med list.                   Brand Name Dispense Instructions for use Diagnosis    acetaminophen 325 MG tablet    TYLENOL    100 tablet    Take 2 tablets (650 mg) by mouth every 4 hours as needed for mild pain    S/P  section       enoxaparin 120 MG/0.8ML injection    LOVENOX    60 Syringe    Inject 0.8 mLs (120 mg) Subcutaneous every 12 hours    Current use of long term anticoagulation, Homozygous Factor V Leiden mutation (H)       mupirocin 2 % ointment    BACTROBAN    30 g    Apply to nipples after feedings with plastic wrap.  No need to wash off.    Lactation disorder       PRENATAL VITAMIN PO           VITAMIN D (CHOLECALCIFEROL) PO      Take 5,000 Units by mouth daily

## 2018-08-07 NOTE — LETTER
"2018       RE: Benita Olivo  [address] [facility] [address]     Dear Colleague,    Thank you for referring your patient, Benita Olivo, to the [facility] at [facility]. Please see a copy of my visit note below.    SUBJECTIVE   Benita Olivo is a 38 year old  here for 6 week postpartum visit from  delivery.  See op note and discharge summary for further details. Lochia ceased, no menses yet. Breastfeeding. Denies breast/bowel/bladder complaints. Denies problems with incision. No intercourse yet. Mood is stable. Does not want contraception.     Only complaint today is that a few days ago, was lifting something and felt a \"pop\" in her midline lower back with sharp pain. Has been taking pretty much scheduled NSAIDs and Tylenol for the past several days.       Review of Systems   ROS: 10 point ROS neg other than the symptoms noted above in the HPI.      OBJECTIVE   Ht 1.753 m (5' 9.02\")  Wt 100.7 kg (221 lb 14.4 oz)  LMP 2017  BMI 32.75 kg/m2  BMI: Body mass index is 32.75 kg/(m^2).  General:  Alert, no distress   Head:  Normocephalic, without obvious abnormality   Abdomen:  Soft, non-tender, non-distended, incision well-healed, no evidence of diastasis   Pelvic: -nefg  -bladder wnl, well supported  -vagina normal without discharge  -cervix normal in appearance, no masses  -uterus 9 wk size, AV, mobile, NT  -no adnexal masses, NT  -anus wnl, digital exam deferred   Extremities:  normal       ASSESSMENT   Benita Olivo is a 38 year old , 6 weeks postpartum s/p  delivery.    PLAN   -- Back pain: will refer to ortho for consult. Discussed likely due to weakened core muscles, may need referral to PT but will make sure no other acute or surgical issues first. Okay to continue OTC prn meds until eval with them.   -- Homozygous FVL, hx DVT and PE: currently taking Lovenox 120mg BID. Plans to call hematology " by phone now to transition back to Coumadin.   -- Return to fertility reviewed. Declines contraception. Discussed pregnancy interval of 2 years recommended post .    -- Return as needed or at time interval for next routine pap, pelvic, or breast exam.  -- Continue a multi vitamin supplement, especially if breastfeeding.    RTC 1 year or prn    Rosina Leos MD  2018

## 2018-08-07 NOTE — NURSING NOTE
SUBJECTIVE:   Benita Olivo is here for her 6-week postpartum checkup.     PHQ-9 score:   Hx of Abuse:  No    Delivery Date: 18.    Delivering provider:  Junie Hightower MD.    Type of delivery:  .     Delivery complications: None  Infant gender:  girl, weight 10 pounds 2 oz.  Feeding Method:  .  Complications reported with feeding:  none, infant thriving .    Bleeding:  None.  Duration:  5 weeks.  Menses resumed:  No  Bowel/Urinary problems:  No    Contraception Planned:  None -- is she planning pregnancy? no  She  has not had intercourse since delivery..

## 2018-08-07 NOTE — PROGRESS NOTES
"SUBJECTIVE   Benita Olivo is a 38 year old  here for 6 week postpartum visit from  delivery.  See op note and discharge summary for further details. Lochia ceased, no menses yet. Breastfeeding. Denies breast/bowel/bladder complaints. Denies problems with incision. No intercourse yet. Mood is stable. Does not want contraception.     Only complaint today is that a few days ago, was lifting something and felt a \"pop\" in her midline lower back with sharp pain. Has been taking pretty much scheduled NSAIDs and Tylenol for the past several days.       Review of Systems   ROS: 10 point ROS neg other than the symptoms noted above in the HPI.      OBJECTIVE   Ht 1.753 m (5' 9.02\")  Wt 100.7 kg (221 lb 14.4 oz)  LMP 2017  BMI 32.75 kg/m2  BMI: Body mass index is 32.75 kg/(m^2).  General:  Alert, no distress   Head:  Normocephalic, without obvious abnormality   Abdomen:  Soft, non-tender, non-distended, incision well-healed, no evidence of diastasis   Pelvic: -nefg  -bladder wnl, well supported  -vagina normal without discharge  -cervix normal in appearance, no masses  -uterus 9 wk size, AV, mobile, NT  -no adnexal masses, NT  -anus wnl, digital exam deferred   Extremities:  normal       ASSESSMENT   Benita Olivo is a 38 year old , 6 weeks postpartum s/p  delivery.    PLAN   -- Back pain: will refer to ortho for consult. Discussed likely due to weakened core muscles, may need referral to PT but will make sure no other acute or surgical issues first. Okay to continue OTC prn meds until eval with them.   -- Homozygous FVL, hx DVT and PE: currently taking Lovenox 120mg BID. Plans to call hematology by phone now to transition back to Coumadin.   -- Return to fertility reviewed. Declines contraception. Discussed pregnancy interval of 2 years recommended post .    -- Return as needed or at time interval for next routine pap, pelvic, or breast exam.  -- Continue a multi vitamin " supplement, especially if breastfeeding.    RTC 1 year or prn    Rosina Leos MD  8/7/2018

## 2018-08-20 ENCOUNTER — HEALTH MAINTENANCE LETTER (OUTPATIENT)
Age: 38
End: 2018-08-20

## 2018-08-29 ENCOUNTER — ANTICOAGULATION THERAPY VISIT (OUTPATIENT)
Dept: ANTICOAGULATION | Facility: CLINIC | Age: 38
End: 2018-08-29

## 2018-08-29 DIAGNOSIS — Z79.01 LONG-TERM (CURRENT) USE OF ANTICOAGULANTS: ICD-10-CM

## 2018-08-29 DIAGNOSIS — D68.51 HOMOZYGOUS FACTOR V LEIDEN MUTATION (H): ICD-10-CM

## 2018-08-29 DIAGNOSIS — I82.409 DEEP VEIN THROMBOSIS (DVT) (H): ICD-10-CM

## 2018-08-29 NOTE — MR AVS SNAPSHOT
Benita Olivo   8/29/2018   Anticoagulation Therapy Visit    Description:  38 year old female   Provider:  Efren Wade, [facility]   Department:  [facility]           INR as of 8/29/2018     Today's INR       Anticoagulation Summary as of 8/29/2018     INR goal 2.0-3.0   Today's INR    Full warfarin instructions No maintenance plan   Next INR check     Indications   Deep vein thrombosis (DVT) (H) [I82.409] [I82.409]  Long-term (current) use of anticoagulants [Z79.01] [Z79.01]  Homozygous Factor V Leiden mutation (H) [D68.51]         August 2018 Details    Sun Mon Tue Wed Thu Fri Sat        1               2               3               4                 5               6               7               8               9               10               11                 12               13               14               15               16               17               18                 19               20               21               22               23               24               25                 26               27               28               29         See details      30 31                 Date Details   08/29 This INR check      Date of next INR: No date specified

## 2018-08-30 NOTE — TELEPHONE ENCOUNTER
FUTURE VISIT INFORMATION      FUTURE VISIT INFORMATION:    Date: 9/04    Time: 12:30    Location:   REFERRAL INFORMATION:    Referring provider: Rosina Leos,     Referring providers clinic: [facility]    Reason for visit/diagnosis: Acute midline low back pain without sciatica      RECORDS STATUS      RECORDS INTERNAL

## 2018-09-04 ENCOUNTER — PRE VISIT (OUTPATIENT)
Dept: ORTHOPEDICS | Facility: CLINIC | Age: 38
End: 2018-09-04

## 2018-09-04 ENCOUNTER — OFFICE VISIT (OUTPATIENT)
Dept: ORTHOPEDICS | Facility: CLINIC | Age: 38
End: 2018-09-04
Payer: COMMERCIAL

## 2018-09-04 ENCOUNTER — RADIANT APPOINTMENT (OUTPATIENT)
Dept: GENERAL RADIOLOGY | Facility: CLINIC | Age: 38
End: 2018-09-04
Payer: COMMERCIAL

## 2018-09-04 VITALS — WEIGHT: 220 LBS | HEIGHT: 71 IN | BODY MASS INDEX: 30.8 KG/M2

## 2018-09-04 DIAGNOSIS — M54.6 THORACIC SPINE PAIN: Primary | ICD-10-CM

## 2018-09-04 DIAGNOSIS — M54.9 MID BACK PAIN: ICD-10-CM

## 2018-09-04 DIAGNOSIS — Z98.891 S/P CESAREAN SECTION: ICD-10-CM

## 2018-09-04 NOTE — LETTER
2018      RE: Benita Olivo  [address] [place] [id number]        Subjective:   Benita Olivo is a 38 year old female who complains of mid to lower back pain that spreads out to the left and right. She states that she was lifting a stroller when she felt a pop in her back. It was sore for about 2 weeks. She then went to lift her son and has had increased pain in the back. She is two and half months post . She feels like she has lost all abdominal strength. She is here for further evaluation at the recommendation of her obstetrician.  She has had no prior back pain other than in the last trimester of her pregnancy.  She has no cauda equina symptoms.  She has no abdominal zonesthesia or dysesthesia.       Background:   Date of injury: None   Duration of symptoms: 1 months  Mechanism of Injury: Acute; Unknown   Aggravating factors: Shifting weight or turning in bed, lifting, bending over  Relieving Factors: rest and NSAIDs  Prior Evaluation: Prior Physician Evalutation: Dr. Leos    PAST MEDICAL, SOCIAL, SURGICAL AND FAMILY HISTORY: She  has a past medical history of Abnormal Pap smear of cervix (); ASCUS on Pap smear (2012); DVT of upper extremity (deep vein thrombosis) (H) (); Factor V deficiency (H) (); Mass (); and PE (pulmonary embolism) (). She also has no past medical history of Anemia; Blood dyscrasia; Malignant hyperthermia; or PONV (postoperative nausea and vomiting).  She  has a past surgical history that includes wisdom teeth; Appendectomy open child (); Cystoscopy, excise urethral caruncle, combined (2012); Conization (2012); and  section (N/A, 2018).  Her family history includes Blood Disease in her brother, father, mother, and sister; Cardiovascular in her paternal grandmother; Circulatory in her maternal uncle and paternal uncle.  She reports that she has never smoked. She has never used smokeless tobacco. She reports that  "she does not drink alcohol or use illicit drugs.    ALLERGIES: She is allergic to misoprostol.    CURRENT MEDICATIONS: She has a current medication list which includes the following prescription(s): enoxaparin, prenatal vit-fe fumarate-fa, cholecalciferol, acetaminophen, and mupirocin.     REVIEW OF SYSTEMS: 10 point review of systems is negative except as noted above.     Exam:   Ht 5' 11\" (1.803 m)  Wt 220 lb (99.8 kg)  LMP 09/18/2017  BMI 30.68 kg/m2     CONSTITUTIONAL: healthy, alert and no distress  HEAD: Normocephalic. No masses, lesions, tenderness or abnormalities  SKIN: no suspicious lesions or rashes  GAIT: normal  NEUROLOGIC: Non-focal  PSYCHIATRIC: affect normal/bright and mentation appears normal.    MUSCULOSKELETAL:   THORACIC SPINE:  She is nontender over the thoracic spine.  She also has no pain with percussion over the thoracic spine.  Flexion, extension and extension rotation to the right and left demonstrate that she has some mild lower thoracic discomfort with extension rotation to the right and left but no difficulty with flexion.  She has negative dural tension signs.  L4 through S1 are intact and she has no upper motor neuron signs.      ABDOMEN:  She has intact abdominal musculature.  Performing a static sit up demonstrates no clear herniation of the linea alba.  There are no masses that are palpable.      A scoliosis series best identifies the thoracic and lumbar spine and demonstrates no evidence of fracture per se.  There is some mild wedging of T12 but unclear if this is a normal variant or of undetermined age.  She does note to have an anterior pelvic tilt.      ASSESSMENT:  Benita is a 38-year-old female who has some retained antepartum weight.  I have recommended a diet and exercise to assist with this.  With regards to her back discomfort, this is likely related to her facets and aggravation in part because of her anterior pelvic tilt and her retained antepartum weight.  To that " end, I am going to have her see Physical Therapy to work on hip flexor and hamstring stretching as well as dynamic thoracic and lumbar stabilization and neutralization of her anterior pelvic tilt.      Thank you for allowing me to participate in her care.         Danny Allen MD

## 2018-09-04 NOTE — PROGRESS NOTES
Subjective:   Benita Olivo is a 38 year old female who complains of mid to lower back pain that spreads out to the left and right. She states that she was lifting a stroller when she felt a pop in her back. It was sore for about 2 weeks. She then went to lift her son and has had increased pain in the back. She is two and half months post . She feels like she has lost all abdominal strength. She is here for further evaluation at the recommendation of her obstetrician.  She has had no prior back pain other than in the last trimester of her pregnancy.  She has no cauda equina symptoms.  She has no abdominal zonesthesia or dysesthesia.       Background:   Date of injury: None   Duration of symptoms: 1 months  Mechanism of Injury: Acute; Unknown   Aggravating factors: Shifting weight or turning in bed, lifting, bending over  Relieving Factors: rest and NSAIDs  Prior Evaluation: Prior Physician Evalutation: Dr. Leos    PAST MEDICAL, SOCIAL, SURGICAL AND FAMILY HISTORY: She  has a past medical history of Abnormal Pap smear of cervix (); ASCUS on Pap smear (2012); DVT of upper extremity (deep vein thrombosis) (H) (); Factor V deficiency (H) (); Mass (); and PE (pulmonary embolism) (). She also has no past medical history of Anemia; Blood dyscrasia; Malignant hyperthermia; or PONV (postoperative nausea and vomiting).  She  has a past surgical history that includes wisdom teeth; Appendectomy open child (); Cystoscopy, excise urethral caruncle, combined (2012); Conization (2012); and  section (N/A, 2018).  Her family history includes Blood Disease in her brother, father, mother, and sister; Cardiovascular in her paternal grandmother; Circulatory in her maternal uncle and paternal uncle.  She reports that she has never smoked. She has never used smokeless tobacco. She reports that she does not drink alcohol or use illicit drugs.    ALLERGIES: She is allergic to  "misoprostol.    CURRENT MEDICATIONS: She has a current medication list which includes the following prescription(s): enoxaparin, prenatal vit-fe fumarate-fa, cholecalciferol, acetaminophen, and mupirocin.     REVIEW OF SYSTEMS: 10 point review of systems is negative except as noted above.     Exam:   Ht 5' 11\" (1.803 m)  Wt 220 lb (99.8 kg)  LMP 09/18/2017  BMI 30.68 kg/m2     CONSTITUTIONAL: healthy, alert and no distress  HEAD: Normocephalic. No masses, lesions, tenderness or abnormalities  SKIN: no suspicious lesions or rashes  GAIT: normal  NEUROLOGIC: Non-focal  PSYCHIATRIC: affect normal/bright and mentation appears normal.    MUSCULOSKELETAL:   THORACIC SPINE:  She is nontender over the thoracic spine.  She also has no pain with percussion over the thoracic spine.  Flexion, extension and extension rotation to the right and left demonstrate that she has some mild lower thoracic discomfort with extension rotation to the right and left but no difficulty with flexion.  She has negative dural tension signs.  L4 through S1 are intact and she has no upper motor neuron signs.      ABDOMEN:  She has intact abdominal musculature.  Performing a static sit up demonstrates no clear herniation of the linea alba.  There are no masses that are palpable.      A scoliosis series best identifies the thoracic and lumbar spine and demonstrates no evidence of fracture per se.  There is some mild wedging of T12 but unclear if this is a normal variant or of undetermined age.  She does note to have an anterior pelvic tilt.      ASSESSMENT:  Benita is a 38-year-old female who has some retained antepartum weight.  I have recommended a diet and exercise to assist with this.  With regards to her back discomfort, this is likely related to her facets and aggravation in part because of her anterior pelvic tilt and her retained antepartum weight.  To that end, I am going to have her see Physical Therapy to work on hip flexor and hamstring " stretching as well as dynamic thoracic and lumbar stabilization and neutralization of her anterior pelvic tilt.      Thank you for allowing me to participate in her care.

## 2018-09-04 NOTE — MR AVS SNAPSHOT
After Visit Summary   2018    Benita Olivo    MRN: [id number]           Patient Information     Date Of Birth          1980        Visit Information        Provider Department      2018 2:30 PM Danny Allen MD [facility]        Today's Diagnoses     Thoracic spine pain    -  1    S/P  section           Follow-ups after your visit        Additional Services     PHYSICAL THERAPY REFERRAL (Internal)       Physical Therapy Referral                  Your next 10 appointments already scheduled     Sep 06, 2018  8:20 AM CDT   (Arrive by 8:05 AM)   [facility] Spine with Abby Small PT   [facility]    [address] [place] [id number] [phone]            Sep 13, 2018 10:20 AM CDT   [facility] Spine with Abby Small PT   [facility])    [address] [place] [id number] [phone]              Who to contact     Please call your clinic at [phone] to:    Ask questions about your health    Make or cancel appointments    Discuss your medicines    Learn about your test results    Speak to your doctor            Additional Information About Your Visit        [employer] Information     "[employer] gives you secure access to your electronic health record. If you see a primary care provider, you can also send messages to your care team and make appointments. If you have questions, please call your primary care clinic.  If you do not have a primary care provider, please call [phone] and they will assist you.      "[employer]" is an electronic gateway that provides easy, online access to your medical records. With "[employer]", you can request a clinic appointment, read your test results, renew a prescription or communicate with your care team.     To access your existing account, please contact your [facility]  "[place] [facility] or call [phone] for assistance.        Care EveryWhere ID     This is your Care EveryWhere ID. This could be used by other organizations to access your [facility] medical records  [id number]        Your Vitals Were     Height Last Period BMI (Body Mass Index)             5' 11\" (1.803 m) 09/18/2017 30.68 kg/m2          Blood Pressure from Last 3 Encounters:   08/07/18 102/70   06/21/18 102/62   06/11/18 105/73    Weight from Last 3 Encounters:   09/04/18 220 lb (99.8 kg)   08/07/18 221 lb 14.4 oz (100.7 kg)   06/11/18 245 lb 11.2 oz (111.4 kg)              We Performed the Following     PHYSICAL THERAPY REFERRAL (Internal)        Primary Care Provider Fax #    Physician No Ref-Primary [phone]       No address on file        Equal Access to Services     MISAEL [facility] : Hadii alda Mcconenll, wamateusda aaliyah, jannta kaalmajackie morris, genevieve mejia . So [facility] [phone].    ATENCIÓN: Si habla español, tiene a pearce disposición servicios gratuitos de asistencia lingüística. Llame al [phone].    We comply with applicable federal civil rights laws and [place] laws. We do not discriminate on the basis of race, color, national origin, age, disability, sex, sexual orientation, or gender identity.            Thank you!     Thank you for choosing [facility]  for your care. Our goal is always to provide you with excellent care. Hearing back from our patients is one way we can continue to improve our services. Please take a few minutes to complete the written survey that you may receive in the mail after your visit with us. Thank you!             Your Updated Medication List - Protect others around you: Learn how to safely use, store and throw away your medicines at www.disposemymeds.org.          This list is accurate as of 9/4/18 11:59 PM.  Always use your most recent med list.                   Brand Name Dispense Instructions for use " Diagnosis    acetaminophen 325 MG tablet    TYLENOL    100 tablet    Take 2 tablets (650 mg) by mouth every 4 hours as needed for mild pain    S/P  section       enoxaparin 120 MG/0.8ML injection    LOVENOX    60 Syringe    Inject 0.8 mLs (120 mg) Subcutaneous every 12 hours    Current use of long term anticoagulation, Homozygous Factor V Leiden mutation (H)       mupirocin 2 % ointment    BACTROBAN    30 g    Apply to nipples after feedings with plastic wrap.  No need to wash off.    Lactation disorder       PRENATAL VITAMIN PO           VITAMIN D (CHOLECALCIFEROL) PO      Take 5,000 Units by mouth daily

## 2018-09-11 ENCOUNTER — THERAPY VISIT (OUTPATIENT)
Dept: PHYSICAL THERAPY | Facility: CLINIC | Age: 38
End: 2018-09-11
Payer: COMMERCIAL

## 2018-09-11 DIAGNOSIS — M54.6 PAIN IN THORACIC SPINE: Primary | ICD-10-CM

## 2018-09-11 PROCEDURE — 97112 NEUROMUSCULAR REEDUCATION: CPT | Mod: GP | Performed by: PHYSICAL THERAPIST

## 2018-09-11 PROCEDURE — 97140 MANUAL THERAPY 1/> REGIONS: CPT | Mod: GP | Performed by: PHYSICAL THERAPIST

## 2018-09-11 PROCEDURE — 97161 PT EVAL LOW COMPLEX 20 MIN: CPT | Mod: GP | Performed by: PHYSICAL THERAPIST

## 2018-09-11 NOTE — MR AVS SNAPSHOT
After Visit Summary   9/11/2018    Benita Olivo    MRN: [id number]           Patient Information     Date Of Birth          1980        Visit Information        Provider Department      9/11/2018 5:20 PM Abby Small PT [facility]        Today's Diagnoses     Pain in thoracic spine    -  1       Follow-ups after your visit        Your next 10 appointments already scheduled     Sep 13, 2018 10:20 AM CDT   [facility] Spine with Abby Small PT   [facility] [place] ([address] [id number] [phone]              Who to contact     If you have questions or need follow up information about today's clinic visit or your schedule please contact [facility] directly at [phone].  Normal or non-critical lab and imaging results will be communicated to you by [employer], letter or phone within 4 business days after the clinic has received the results. If you do not hear from us within 7 days, please contact the clinic through [employer] or phone. If you have a critical or abnormal lab result, we will notify you by phone as soon as possible.  Submit refill requests through [employer] or call your pharmacy and they will forward the refill request to us. Please allow 3 business days for your refill to be completed.          Additional Information About Your Visit        MyChart Information     [employer] gives you secure access to your electronic health record. If you see a primary care provider, you can also send messages to your care team and make appointments. If you have questions, please call your primary care clinic.  If you do not have a primary care provider, please call [phone] and they will assist you.        Care EveryWhere ID     This is your Care EveryWhere ID. This could be used by other organizations to access your [facility]  records  [id number]        Your Vitals Were     Last Period                   2017            Blood Pressure from Last 3 Encounters:   18 102/70   18 102/62   18 105/73    Weight from Last 3 Encounters:   18 99.8 kg (220 lb)   18 100.7 kg (221 lb 14.4 oz)   18 111.4 kg (245 lb 11.2 oz)              We Performed the Following     Manual Ther Tech, 1+Regions, EA 15 min     Neuromuscular Re-Education     PT Sherylal, Low Complexity ([id number])        Primary Care Provider Fax #    Physician No Ref-Primary [phone]       No address on file        Equal Access to Services     MISAEL WISDOM : Kam Mcconnell, ede fischer, lamar morris, genevieve patel. So [facility] [phone].    ATENCIÓN: Si habla español, tiene a pearce disposición servicios gratuitos de asistencia lingüística. Llame al [phone].    We comply with applicable federal civil rights laws and [place] laws. We do not discriminate on the basis of race, color, national origin, age, disability, sex, sexual orientation, or gender identity.            Thank you!     Thank you for choosing [facility]  for your care. Our goal is always to provide you with excellent care. Hearing back from our patients is one way we can continue to improve our services. Please take a few minutes to complete the written survey that you may receive in the mail after your visit with us. Thank you!             Your Updated Medication List - Protect others around you: Learn how to safely use, store and throw away your medicines at www.disposemymeds.org.          This list is accurate as of 18  5:34 PM.  Always use your most recent med list.                   Brand Name Dispense Instructions for use Diagnosis    acetaminophen 325 MG tablet    TYLENOL    100 tablet    Take 2 tablets (650 mg) by mouth every 4 hours as needed for mild pain    S/P  section        enoxaparin 120 MG/0.8ML injection    LOVENOX    60 Syringe    Inject 0.8 mLs (120 mg) Subcutaneous every 12 hours    Current use of long term anticoagulation, Homozygous Factor V Leiden mutation (H)       mupirocin 2 % ointment    BACTROBAN    30 g    Apply to nipples after feedings with plastic wrap.  No need to wash off.    Lactation disorder       PRENATAL VITAMIN PO           VITAMIN D (CHOLECALCIFEROL) PO      Take 5,000 Units by mouth daily

## 2018-09-13 ENCOUNTER — THERAPY VISIT (OUTPATIENT)
Dept: PHYSICAL THERAPY | Facility: CLINIC | Age: 38
End: 2018-09-13
Payer: COMMERCIAL

## 2018-09-13 DIAGNOSIS — M54.6 PAIN IN THORACIC SPINE: ICD-10-CM

## 2018-09-13 PROCEDURE — 97112 NEUROMUSCULAR REEDUCATION: CPT | Mod: GP | Performed by: PHYSICAL THERAPIST

## 2018-09-13 PROCEDURE — 97140 MANUAL THERAPY 1/> REGIONS: CPT | Mod: GP | Performed by: PHYSICAL THERAPIST

## 2018-09-13 NOTE — PROGRESS NOTES
[facility] Initial Evaluation  Subjective:  Patient is a 38 year old female presenting with rehab back hpi.                    and reported as 3/10.                General health as reported by patient is fair.  Pertinent medical history includes:  Overweight.      Medication history: Lovenox.  Current occupation .    Primary job tasks include:  Prolonged sitting (computer work, carrying).              Oswestry Score: 30 %                 Objective:  System    Physical Exam    General     ROS    Assessment/Plan:

## 2018-09-20 ENCOUNTER — THERAPY VISIT (OUTPATIENT)
Dept: PHYSICAL THERAPY | Facility: CLINIC | Age: 38
End: 2018-09-20
Payer: COMMERCIAL

## 2018-09-20 DIAGNOSIS — M54.6 PAIN IN THORACIC SPINE: ICD-10-CM

## 2018-09-20 PROCEDURE — 97110 THERAPEUTIC EXERCISES: CPT | Mod: GP | Performed by: PHYSICAL THERAPIST

## 2018-09-20 PROCEDURE — 97112 NEUROMUSCULAR REEDUCATION: CPT | Mod: GP | Performed by: PHYSICAL THERAPIST

## 2018-09-20 PROCEDURE — 97140 MANUAL THERAPY 1/> REGIONS: CPT | Mod: GP | Performed by: PHYSICAL THERAPIST

## 2018-09-25 ENCOUNTER — THERAPY VISIT (OUTPATIENT)
Dept: PHYSICAL THERAPY | Facility: CLINIC | Age: 38
End: 2018-09-25
Payer: COMMERCIAL

## 2018-09-25 DIAGNOSIS — M54.6 PAIN IN THORACIC SPINE: ICD-10-CM

## 2018-09-25 PROCEDURE — 97110 THERAPEUTIC EXERCISES: CPT | Mod: GP | Performed by: PHYSICAL THERAPIST

## 2018-09-25 PROCEDURE — 97140 MANUAL THERAPY 1/> REGIONS: CPT | Mod: GP | Performed by: PHYSICAL THERAPIST

## 2018-09-25 PROCEDURE — 97112 NEUROMUSCULAR REEDUCATION: CPT | Mod: GP | Performed by: PHYSICAL THERAPIST

## 2018-10-04 ENCOUNTER — THERAPY VISIT (OUTPATIENT)
Dept: PHYSICAL THERAPY | Facility: CLINIC | Age: 38
End: 2018-10-04
Payer: COMMERCIAL

## 2018-10-04 DIAGNOSIS — M54.6 PAIN IN THORACIC SPINE: ICD-10-CM

## 2018-10-04 PROCEDURE — 97140 MANUAL THERAPY 1/> REGIONS: CPT | Mod: GP | Performed by: PHYSICAL THERAPIST

## 2018-10-04 PROCEDURE — 97112 NEUROMUSCULAR REEDUCATION: CPT | Mod: GP | Performed by: PHYSICAL THERAPIST

## 2018-10-04 PROCEDURE — 97110 THERAPEUTIC EXERCISES: CPT | Mod: GP | Performed by: PHYSICAL THERAPIST

## 2018-10-15 ENCOUNTER — THERAPY VISIT (OUTPATIENT)
Dept: PHYSICAL THERAPY | Facility: CLINIC | Age: 38
End: 2018-10-15
Payer: COMMERCIAL

## 2018-10-15 DIAGNOSIS — M54.6 PAIN IN THORACIC SPINE: ICD-10-CM

## 2018-10-15 PROCEDURE — 97140 MANUAL THERAPY 1/> REGIONS: CPT | Mod: GP | Performed by: PHYSICAL THERAPIST

## 2018-10-15 PROCEDURE — 97112 NEUROMUSCULAR REEDUCATION: CPT | Mod: GP | Performed by: PHYSICAL THERAPIST

## 2018-10-16 NOTE — PROGRESS NOTES
Addendum 10/16/18    I spoke with Benita today. She is still on Lovenox. She gave no time dfram of when she will be converting back to warfarin. She said she will be speaking to Dr Mays office and let us know when she starts her warfarin    Cali Mauro [facility]        ADDENDUM 12/4/18  Sent message to Ann Kohli to determine if patient will restart Coumadin.  Benita is currently on Lovenox.    Werner FranksRN

## 2018-12-05 ENCOUNTER — TELEPHONE (OUTPATIENT)
Dept: HEMATOLOGY | Facility: CLINIC | Age: 38
End: 2018-12-05

## 2018-12-06 ENCOUNTER — ANTICOAGULATION THERAPY VISIT (OUTPATIENT)
Dept: ANTICOAGULATION | Facility: CLINIC | Age: 38
End: 2018-12-06

## 2018-12-06 DIAGNOSIS — I82.409 DEEP VEIN THROMBOSIS (DVT) (H): ICD-10-CM

## 2018-12-06 DIAGNOSIS — D68.51 HOMOZYGOUS FACTOR V LEIDEN MUTATION (H): ICD-10-CM

## 2018-12-06 NOTE — PROGRESS NOTES
Ann from bleeding and clotting reports that he spoke with patient and she will call [facility] when she is ready to start warfarin.      Addendum 12/26/18  Left message for patient that they have not been compliant with having the necessary lab work for their anticoagulation therapy. Patient  has been sent two letters stating that they have missed their lab appointments and that it is very important to have labs done to make sure that they are in their therapeutic range to minimize the risks of bleeding and clotting. I asked them to call us or come in for their lab work as soon as possible. Patient will be inactivated from our anticoagulation monitoring service if they do not respond within one week.   Cali Wade [facility]

## 2018-12-06 NOTE — MR AVS SNAPSHOT
Benita Olivo   12/6/2018   Anticoagulation Therapy Visit    Description:  38 year old female   Provider:  Christy Santana, RN   Department:  [facility]           INR as of 12/6/2018     Today's INR       Anticoagulation Summary as of 12/6/2018     INR goal 2.0-3.0   Today's INR    Next INR check     Indications   Deep vein thrombosis (DVT) (H) [I82.409] [I82.409]  Long-term (current) use of anticoagulants [Z79.01] [Z79.01]  Homozygous Factor V Leiden mutation (H) [D68.51]         August 2018 Details    Sun Mon Tue Wed u Fri Sat        1               2               3               4                 5               6               7               8               9               10               11                 12               13               14               15               16               17               18                 19               20               21               22               23               24               25                 26               27               28               29         See details      30 31                 Date Details   08/29 This INR check                 September 2018 Details    Sun Mon Tue Wed u Fri Sat           1                 2               3               4               5               6               7               8                 9               10               11               12               13               14               15                 16               17               18               19               20               21               22                 23               24               25               26               27               28               29                 30                      Date Details   No additional details              October 2018 Details    [facility]      1               2               3               4               5               6                 7                8               9               10               11               12               13                 14               15               16               17               18               19               20                 21               22               23               24               25               26               27                 28               29               30               31                   Date Details   No additional details              November 2018 Details    Sun Mon Tue Wed Thu Fri Sat         1               2               3                 4               5               6               7               8               9               10                 11               12               13               14               15               16               17                 18               19               20               21               22               23            24                 25               26               27               28               29               30                 Date Details   No additional details    Date of next INR:  11/23/2018

## 2019-01-03 ENCOUNTER — ANTICOAGULATION THERAPY VISIT (OUTPATIENT)
Dept: ANTICOAGULATION | Facility: CLINIC | Age: 39
End: 2019-01-03

## 2019-01-03 DIAGNOSIS — I82.409 DEEP VEIN THROMBOSIS (DVT) (H): ICD-10-CM

## 2019-01-03 DIAGNOSIS — D68.51 HOMOZYGOUS FACTOR V LEIDEN MUTATION (H): ICD-10-CM

## 2019-01-03 NOTE — PROGRESS NOTES
ANTICOAGULATION FOLLOW-UP CLINIC VISIT    Patient Name:  Benita Olivo  Date:  1/3/2019  Contact Type:  Telephone    SUBJECTIVE:     Patient Findings     Positives:   Intentional hold of therapy    Comments:   Pt called wanting to restart her Warfarin after being off due to pregnancy. Pt instructed to restart Warfarin tomorrow + continue Lovenox 100mg BID           OBJECTIVE    INR   Date Value Ref Range Status   06/18/2018 0.96 0.86 - 1.14 Final       ASSESSMENT / PLAN  No question data found.  Anticoagulation Summary  As of 1/3/2019    INR goal:   2.0-3.0   TTR:   48.9 % (1.5 y)   INR used for dosing:   No new INR was available at the time of this encounter.   Warfarin maintenance plan:   No maintenance plan   Full warfarin instructions:   1/4: 12.5 mg; 1/5: 10 mg; 1/6: 10 mg   Weekly warfarin total:   75 mg   Plan last modified:   Efren Wade [facility] (6/15/2018)   Next INR check:   1/7/2019   Priority:   INR   Target end date:       Indications    Deep vein thrombosis (DVT) (H) [I82.409] [I82.409]  Long-term (current) use of anticoagulants [Z79.01] [Z79.01]  Homozygous Factor V Leiden mutation (H) [D68.51]             Anticoagulation Episode Summary     INR check location:       Preferred lab:       Send INR reminders to:   [facility]    Comments:   patient contact number [phone]   name Will Prefers Fridays for lab days and as infrequently as possible.      Anticoagulation Care Providers     Provider Role Specialty Phone number    Reed Garcia MD Responsible Hematology [phone]            See the Encounter Report to view Anticoagulation Flowsheet and Dosing Calendar (Go to Encounters tab in chart review, and find the Anticoagulation Therapy Visit)    Spoke with patient. Gave them new warfarin recommendation.  No changes in health, medication, or diet.    Noemi Morgan RN

## 2019-01-07 ENCOUNTER — ANTICOAGULATION THERAPY VISIT (OUTPATIENT)
Dept: ANTICOAGULATION | Facility: CLINIC | Age: 39
End: 2019-01-07

## 2019-01-07 DIAGNOSIS — D68.51 HOMOZYGOUS FACTOR V LEIDEN MUTATION (H): ICD-10-CM

## 2019-01-07 DIAGNOSIS — Z79.01 LONG TERM CURRENT USE OF ANTICOAGULANT THERAPY: ICD-10-CM

## 2019-01-07 DIAGNOSIS — I82.409 DEEP VEIN THROMBOSIS (DVT) (H): ICD-10-CM

## 2019-01-07 LAB — INR PPP: 1.4 (ref 0.86–1.14)

## 2019-01-07 PROCEDURE — 85610 PROTHROMBIN TIME: CPT | Performed by: INTERNAL MEDICINE

## 2019-01-07 PROCEDURE — 36416 COLLJ CAPILLARY BLOOD SPEC: CPT | Performed by: INTERNAL MEDICINE

## 2019-01-07 NOTE — PROGRESS NOTES
ANTICOAGULATION FOLLOW-UP CLINIC VISIT    Patient Name:  Benita Olivo  Date:  2019  Contact Type:  Telephone    SUBJECTIVE:        OBJECTIVE    INR   Date Value Ref Range Status   2019 1.40 (H) 0.86 - 1.14 Final       ASSESSMENT / PLAN  INR assessment SUB    Recheck INR In: 3 DAYS    INR Location Clinic      Anticoagulation Summary  As of 2019    INR goal:   2.0-3.0   TTR:   48.9 % (1.5 y)   INR used for dosin.40! (2019)   Warfarin maintenance plan:   No maintenance plan   Full warfarin instructions:   : 12.5 mg; : 10 mg; : 10 mg   Weekly warfarin total:   75 mg   Plan last modified:   Efren Wade, [facility] (6/15/2018)   Next INR check:   1/10/2019   Priority:   INR   Target end date:       Indications    Deep vein thrombosis (DVT) (H) [I82.409] [I82.409]  Long-term (current) use of anticoagulants [Z79.01] [Z79.01]  Homozygous Factor V Leiden mutation (H) [D68.51]             Anticoagulation Episode Summary     INR check location:       Preferred lab:       Send INR reminders to:    [facility]    Comments:   patient contact number [phone]   name Rigo. Prefers  for lab days and as infrequently as possible.      Anticoagulation Care Providers     Provider Role Specialty Phone number    Reed Garcia MD Responsible Hematology [phone]            See the Encounter Report to view Anticoagulation Flowsheet and Dosing Calendar (Go to Encounters tab in chart review, and find the Anticoagulation Therapy Visit)    Left message for patient with results and dosing recommendations. Asked patient to call back to report any missed doses, falls, signs and symptoms of bleeding or clotting, any changes in health, medication, or diet. Asked patient to call back with any questions or concerns.    Recommended Benita continue lovenox 100 mg BID until INR is therapeutic.      Thais Deng RN

## 2019-01-10 ENCOUNTER — ANTICOAGULATION THERAPY VISIT (OUTPATIENT)
Dept: ANTICOAGULATION | Facility: CLINIC | Age: 39
End: 2019-01-10

## 2019-01-10 DIAGNOSIS — D68.51 HOMOZYGOUS FACTOR V LEIDEN MUTATION (H): ICD-10-CM

## 2019-01-10 DIAGNOSIS — I82.409 DEEP VEIN THROMBOSIS (DVT) (H): ICD-10-CM

## 2019-01-10 DIAGNOSIS — Z79.01 LONG TERM CURRENT USE OF ANTICOAGULANT THERAPY: ICD-10-CM

## 2019-01-10 LAB — INR PPP: 2 (ref 0.86–1.14)

## 2019-01-10 PROCEDURE — 36416 COLLJ CAPILLARY BLOOD SPEC: CPT | Performed by: INTERNAL MEDICINE

## 2019-01-10 PROCEDURE — 85610 PROTHROMBIN TIME: CPT | Performed by: INTERNAL MEDICINE

## 2019-01-10 NOTE — PROGRESS NOTES
ANTICOAGULATION FOLLOW-UP CLINIC VISIT    Patient Name:  Benita Olivo  Date:  1/10/2019  Contact Type:  Telephone    SUBJECTIVE:     Patient Findings     Positives:   Change in medications    Comments:   Spoke to patient.  Continue Lovenox injections 100mg bid until a second therapeutic INR is documented.  Updated calendar.             OBJECTIVE    INR   Date Value Ref Range Status   01/10/2019 2.00 (H) 0.86 - 1.14 Final     Comment:     This test is intended for monitoring Coumadin therapy.  Results are not   accurate in patients with prolonged INR due to factor deficiency.         ASSESSMENT / PLAN  INR assessment THER    Recheck INR In: 5 DAYS    INR Location Clinic      Anticoagulation Summary  As of 1/10/2019    INR goal:   2.0-3.0   TTR:   48.6 % (1.5 y)   INR used for dosin.00 (1/10/2019)   Warfarin maintenance plan:   No maintenance plan   Full warfarin instructions:   1/10: 12.5 mg; : 12.5 mg; : 10 mg; : 10 mg; : 12.5 mg   Weekly warfarin total:   75 mg   Plan last modified:   Efren Wade [facility] (6/15/2018)   Next INR check:   1/15/2019   Priority:   INR   Target end date:       Indications    Deep vein thrombosis (DVT) (H) [I82.409] [I82.409]  Long-term (current) use of anticoagulants [Z79.01] [Z79.01]  Homozygous Factor V Leiden mutation (H) [D68.51]             Anticoagulation Episode Summary     INR check location:       Preferred lab:       Send INR reminders to:   [facility]    Comments:   patient contact number [phone]   name Will Prefers  for lab days and as infrequently as possible.      Anticoagulation Care Providers     Provider Role Specialty Phone number    Reed Garcia MD Responsible Hematology [phone]            See the Encounter Report to view Anticoagulation Flowsheet and Dosing Calendar (Go to Encounters tab in chart review, and find the Anticoagulation Therapy Visit)    Spoke with patient. Gave them their lab  results and new warfarin recommendation.  No changes in health, medication, or diet. No missed doses, no falls. No signs or symptoms of bleed or clotting.    Restarted Warfarin recently post pregnancy.  Continue on Lovenox injections until TWO therapeutic INR results are documented.      Werner Franks RN

## 2019-01-15 ENCOUNTER — ANTICOAGULATION THERAPY VISIT (OUTPATIENT)
Dept: ANTICOAGULATION | Facility: CLINIC | Age: 39
End: 2019-01-15

## 2019-01-15 DIAGNOSIS — Z79.01 LONG TERM CURRENT USE OF ANTICOAGULANT THERAPY: ICD-10-CM

## 2019-01-15 DIAGNOSIS — I82.409 DEEP VEIN THROMBOSIS (DVT) (H): ICD-10-CM

## 2019-01-15 DIAGNOSIS — D68.51 HOMOZYGOUS FACTOR V LEIDEN MUTATION (H): ICD-10-CM

## 2019-01-15 LAB — INR PPP: 3.8 (ref 0.86–1.14)

## 2019-01-15 PROCEDURE — 85610 PROTHROMBIN TIME: CPT | Performed by: INTERNAL MEDICINE

## 2019-01-15 PROCEDURE — 36416 COLLJ CAPILLARY BLOOD SPEC: CPT | Performed by: INTERNAL MEDICINE

## 2019-01-22 ENCOUNTER — ANTICOAGULATION THERAPY VISIT (OUTPATIENT)
Dept: ANTICOAGULATION | Facility: CLINIC | Age: 39
End: 2019-01-22

## 2019-01-22 DIAGNOSIS — Z79.01 LONG TERM CURRENT USE OF ANTICOAGULANT THERAPY: ICD-10-CM

## 2019-01-22 DIAGNOSIS — D68.51 HOMOZYGOUS FACTOR V LEIDEN MUTATION (H): ICD-10-CM

## 2019-01-22 DIAGNOSIS — I82.409 DEEP VEIN THROMBOSIS (DVT) (H): ICD-10-CM

## 2019-01-22 LAB — INR PPP: 3.8 (ref 0.86–1.14)

## 2019-01-22 PROCEDURE — 36416 COLLJ CAPILLARY BLOOD SPEC: CPT | Performed by: INTERNAL MEDICINE

## 2019-01-22 PROCEDURE — 85610 PROTHROMBIN TIME: CPT | Performed by: INTERNAL MEDICINE

## 2019-01-22 NOTE — PROGRESS NOTES
ANTICOAGULATION FOLLOW-UP CLINIC VISIT    Patient Name:  Benita Olivo  Date:  1/22/2019  Contact Type:  Telephone    SUBJECTIVE:        OBJECTIVE    INR   Date Value Ref Range Status   01/22/2019 3.80 (H) 0.86 - 1.14 Final     Comment:     This test is intended for monitoring Coumadin therapy.  Results are not   accurate in patients with prolonged INR due to factor deficiency.         ASSESSMENT / PLAN  INR assessment SUPRA    Recheck INR In: 1 WEEK    INR Location Clinic      Anticoagulation Summary  As of 1/22/2019    INR goal:   2.0-3.0   TTR:   48.1 % (1.6 y)   INR used for dosing:   3.80! (1/22/2019)   Warfarin maintenance plan:   No maintenance plan   Full warfarin instructions:   1/22: 7.5 mg; 1/23: 10 mg; 1/24: 10 mg; 1/25: 12.5 mg; 1/26: 10 mg; 1/27: 10 mg; 1/28: 10 mg   Weekly warfarin total:   75 mg   Plan last modified:   Efren Wade, [facility] (6/15/2018)   Next INR check:   1/29/2019   Priority:   INR   Target end date:       Indications    Deep vein thrombosis (DVT) (H) [I82.409] [I82.409]  Long-term (current) use of anticoagulants [Z79.01] [Z79.01]  Homozygous Factor V Leiden mutation (H) [D68.51]             Anticoagulation Episode Summary     INR check location:       Preferred lab:       Send INR reminders to:   [facility]    Comments:   patient contact number [phone]   name Will Prefers Fridays for lab days and as infrequently as possible.      Anticoagulation Care Providers     Provider Role Specialty Phone number    Reed Garcia MD Responsible Hematology [phone]            See the Encounter Report to view Anticoagulation Flowsheet and Dosing Calendar (Go to Encounters tab in chart review, and find the Anticoagulation Therapy Visit)  Left message for patient with results and dosing recommendations. Asked patient to call back to report any missed doses, falls, signs and symptoms of bleeding or clotting, any changes in health, medication, or diet. Asked  patient to call back with any questions or concerns.      Olga Young RN

## 2019-01-31 ENCOUNTER — ANTICOAGULATION THERAPY VISIT (OUTPATIENT)
Dept: ANTICOAGULATION | Facility: CLINIC | Age: 39
End: 2019-01-31

## 2019-01-31 DIAGNOSIS — D68.51 HOMOZYGOUS FACTOR V LEIDEN MUTATION (H): ICD-10-CM

## 2019-01-31 DIAGNOSIS — I82.409 DEEP VEIN THROMBOSIS (DVT) (H): ICD-10-CM

## 2019-01-31 DIAGNOSIS — Z79.01 LONG TERM CURRENT USE OF ANTICOAGULANT THERAPY: ICD-10-CM

## 2019-01-31 LAB — INR PPP: 3.1 (ref 0.86–1.14)

## 2019-01-31 PROCEDURE — 36416 COLLJ CAPILLARY BLOOD SPEC: CPT | Performed by: INTERNAL MEDICINE

## 2019-01-31 PROCEDURE — 85610 PROTHROMBIN TIME: CPT | Performed by: INTERNAL MEDICINE

## 2019-01-31 NOTE — PROGRESS NOTES
ANTICOAGULATION FOLLOW-UP CLINIC VISIT    Patient Name:  Benita Olivo  Date:  1/31/2019  Contact Type:  Telephone    SUBJECTIVE:     Patient Findings     Positives:   No Problem Findings    Comments:   Dosing confirmed and added to calender.            OBJECTIVE    INR   Date Value Ref Range Status   01/31/2019 3.10 (H) 0.86 - 1.14 Final       ASSESSMENT / PLAN  No question data found.  Anticoagulation Summary  As of 1/31/2019    INR goal:   2.0-3.0   TTR:   47.3 % (1.6 y)   INR used for dosing:   3.10! (1/31/2019)   Warfarin maintenance plan:   10 mg (5 mg x 2) every day   Full warfarin instructions:   10 mg every day   Weekly warfarin total:   70 mg   Plan last modified:   Christy Santana RN (1/31/2019)   Next INR check:   2/14/2019   Priority:   INR   Target end date:       Indications    Deep vein thrombosis (DVT) (H) [I82.409] [I82.409]  Long-term (current) use of anticoagulants [Z79.01] [Z79.01]  Homozygous Factor V Leiden mutation (H) [D68.51]             Anticoagulation Episode Summary     INR check location:       Preferred lab:       Send INR reminders to:   [facility]    Comments:   patient contact number [phone]   name Rigo. Prefers Fridays for lab days and as infrequently as possible.      Anticoagulation Care Providers     Provider Role Specialty Phone number    Reed Garcia MD Responsible Hematology [phone]            See the Encounter Report to view Anticoagulation Flowsheet and Dosing Calendar (Go to Encounters tab in chart review, and find the Anticoagulation Therapy Visit)    Spoke with patient.     Christy Santana RN

## 2019-02-01 RX ORDER — WARFARIN SODIUM 5 MG/1
TABLET ORAL
Qty: 90 TABLET | Refills: 5 | Status: SHIPPED | OUTPATIENT
Start: 2019-02-01 | End: 2019-05-24 | Stop reason: ALTCHOICE

## 2019-02-01 NOTE — PROGRESS NOTES
Addendum 2/1/19 Sent in Refill for Warfarin 5mg tablets to [facility] in [place]. Noemi Morgan RN

## 2019-03-05 ENCOUNTER — ANTICOAGULATION THERAPY VISIT (OUTPATIENT)
Dept: ANTICOAGULATION | Facility: CLINIC | Age: 39
End: 2019-03-05

## 2019-03-05 DIAGNOSIS — I82.409 DEEP VEIN THROMBOSIS (DVT) (H): ICD-10-CM

## 2019-03-05 DIAGNOSIS — Z86.718 HISTORY OF DEEP VENOUS THROMBOSIS: ICD-10-CM

## 2019-03-05 DIAGNOSIS — D68.51 HOMOZYGOUS FACTOR V LEIDEN MUTATION (H): ICD-10-CM

## 2019-03-05 DIAGNOSIS — Z79.01 LONG TERM CURRENT USE OF ANTICOAGULANT THERAPY: ICD-10-CM

## 2019-03-05 LAB — INR PPP: 2.6 (ref 0.86–1.14)

## 2019-03-05 PROCEDURE — 36416 COLLJ CAPILLARY BLOOD SPEC: CPT | Performed by: INTERNAL MEDICINE

## 2019-03-05 PROCEDURE — 85610 PROTHROMBIN TIME: CPT | Performed by: INTERNAL MEDICINE

## 2019-03-05 NOTE — PROGRESS NOTES
ANTICOAGULATION FOLLOW-UP CLINIC VISIT    Patient Name:  Benita Olivo  Date:  3/5/2019  Contact Type:  Telephone    SUBJECTIVE:        OBJECTIVE    INR   Date Value Ref Range Status   2019 2.60 (H) 0.86 - 1.14 Final       ASSESSMENT / PLAN  INR assessment THER    Recheck INR In: 2 WEEKS    INR Location Clinic      Anticoagulation Summary  As of 3/5/2019    INR goal:   2.0-3.0   TTR:   49.1 % (1.7 y)   INR used for dosin.60 (3/5/2019)   Warfarin maintenance plan:   10 mg (5 mg x 2) every day   Full warfarin instructions:   10 mg every day   Weekly warfarin total:   70 mg   Plan last modified:   Christy Santana RN (2019)   Next INR check:   3/19/2019   Priority:   INR   Target end date:       Indications    Deep vein thrombosis (DVT) (H) [I82.409] [I82.409]  Long-term (current) use of anticoagulants [Z79.01] [Z79.01]  Homozygous Factor V Leiden mutation (H) [D68.51]             Anticoagulation Episode Summary     INR check location:       Preferred lab:       Send INR reminders to:   [facility]    Comments:   patient contact number [phone]   name Rigo. Prefers  for lab days and as infrequently as possible.      Anticoagulation Care Providers     Provider Role Specialty Phone number    Reed Garcia MD Responsible Hematology [phone]            See the Encounter Report to view Anticoagulation Flowsheet and Dosing Calendar (Go to Encounters tab in chart review, and find the Anticoagulation Therapy Visit)    Left message for patient with results and dosing recommendations. Asked patient to call back to report any missed doses, falls, signs and symptoms of bleeding or clotting, any changes in health, medication, or diet. Asked patient to call back with any questions or concerns.     Noemi Morgan RN

## 2019-03-09 ENCOUNTER — HEALTH MAINTENANCE LETTER (OUTPATIENT)
Age: 39
End: 2019-03-09

## 2019-03-25 ENCOUNTER — ANTICOAGULATION THERAPY VISIT (OUTPATIENT)
Dept: ANTICOAGULATION | Facility: CLINIC | Age: 39
End: 2019-03-25

## 2019-03-25 DIAGNOSIS — I82.409 DEEP VEIN THROMBOSIS (DVT) (H): ICD-10-CM

## 2019-03-25 DIAGNOSIS — Z79.01 LONG TERM CURRENT USE OF ANTICOAGULANT THERAPY: ICD-10-CM

## 2019-03-25 DIAGNOSIS — D68.51 HOMOZYGOUS FACTOR V LEIDEN MUTATION (H): ICD-10-CM

## 2019-03-25 LAB — INR PPP: 1.7 (ref 0.86–1.14)

## 2019-03-25 PROCEDURE — 85610 PROTHROMBIN TIME: CPT | Performed by: INTERNAL MEDICINE

## 2019-03-25 PROCEDURE — 36416 COLLJ CAPILLARY BLOOD SPEC: CPT | Performed by: INTERNAL MEDICINE

## 2019-03-25 NOTE — PROGRESS NOTES
ANTICOAGULATION FOLLOW-UP CLINIC VISIT    Patient Name:  Benita Olivo  Date:  3/25/2019  Contact Type:  Telephone    SUBJECTIVE:     Patient Findings     Comments:   Missed dose is denied            OBJECTIVE    INR   Date Value Ref Range Status   2019 1.70 (H) 0.86 - 1.14 Final       ASSESSMENT / PLAN  INR assessment SUB    Recheck INR In: 2 WEEKS    INR Location Clinic      Anticoagulation Summary  As of 3/25/2019    INR goal:   2.0-3.0   TTR:   49.6 % (1.7 y)   INR used for dosin.70! (3/25/2019)   Warfarin maintenance plan:   10 mg (5 mg x 2) every day   Full warfarin instructions:   3/25: 12.5 mg; : 12.5 mg; Otherwise 10 mg every day   Weekly warfarin total:   70 mg   Plan last modified:   Christy Santana RN (2019)   Next INR check:   2019   Priority:   INR   Target end date:       Indications    Deep vein thrombosis (DVT) (H) [I82.409] [I82.409]  Long-term (current) use of anticoagulants [Z79.01] [Z79.01]  Homozygous Factor V Leiden mutation (H) [D68.51]             Anticoagulation Episode Summary     INR check location:       Preferred lab:       Send INR reminders to:   [facility]    Comments:   patient contact number [phone]   name iWll Prefers  for lab days and as infrequently as possible.      Anticoagulation Care Providers     Provider Role Specialty Phone number    Reed Garcia MD Responsible Hematology [phone]            See the Encounter Report to view Anticoagulation Flowsheet and Dosing Calendar (Go to Encounters tab in chart review, and find the Anticoagulation Therapy Visit)    Spoke with patient. Gave them their lab results and new warfarin recommendation.  No changes in health, medication, or diet. No missed doses, no falls. No signs or symptoms of bleed or clotting.      Leyla Navarro RN

## 2019-04-05 ENCOUNTER — ANTICOAGULATION THERAPY VISIT (OUTPATIENT)
Dept: ANTICOAGULATION | Facility: CLINIC | Age: 39
End: 2019-04-05

## 2019-04-05 DIAGNOSIS — Z79.01 LONG TERM CURRENT USE OF ANTICOAGULANT THERAPY: ICD-10-CM

## 2019-04-05 DIAGNOSIS — D68.51 HOMOZYGOUS FACTOR V LEIDEN MUTATION (H): ICD-10-CM

## 2019-04-05 DIAGNOSIS — I82.409 DEEP VEIN THROMBOSIS (DVT) (H): ICD-10-CM

## 2019-04-05 LAB — INR PPP: 2 (ref 0.86–1.14)

## 2019-04-05 PROCEDURE — 36416 COLLJ CAPILLARY BLOOD SPEC: CPT | Performed by: INTERNAL MEDICINE

## 2019-04-05 PROCEDURE — 85610 PROTHROMBIN TIME: CPT | Performed by: INTERNAL MEDICINE

## 2019-04-05 NOTE — PROGRESS NOTES
ANTICOAGULATION FOLLOW-UP CLINIC VISIT    Patient Name:  Benita Olivo  Date:  2019  Contact Type:  Telephone    SUBJECTIVE:     Patient Findings     Positives:   Change in diet/appetite    Comments:   Pt reports she has increased her green intake this week and would like to continue this            OBJECTIVE    INR   Date Value Ref Range Status   2019 2.00 (H) 0.86 - 1.14 Final     Comment:     This test is intended for monitoring Coumadin therapy.  Results are not   accurate in patients with prolonged INR due to factor deficiency.         ASSESSMENT / PLAN  INR assessment THER    Recheck INR In: 2 WEEKS    INR Location Clinic      Anticoagulation Summary  As of 2019    INR goal:   2.0-3.0   TTR:   48.8 % (1.8 y)   INR used for dosin.00 (2019)   Warfarin maintenance plan:   10 mg (5 mg x 2) every day   Full warfarin instructions:   : 12.5 mg; : 12.5 mg; : 12.5 mg; 4/15: 12.5 mg; Otherwise 10 mg every day   Weekly warfarin total:   70 mg   Plan last modified:   Christy Santana RN (2019)   Next INR check:   2019   Priority:   INR   Target end date:       Indications    Deep vein thrombosis (DVT) (H) [I82.409] [I82.409]  Long-term (current) use of anticoagulants [Z79.01] [Z79.01]  Homozygous Factor V Leiden mutation (H) [D68.51]             Anticoagulation Episode Summary     INR check location:       Preferred lab:       Send INR reminders to:   [facility]    Comments:   patient contact number [phone]   name Will Prefers  for lab days and as infrequently as possible.      Anticoagulation Care Providers     Provider Role Specialty Phone number    Reed Garcia MD Responsible Hematology [phone]            See the Encounter Report to view Anticoagulation Flowsheet and Dosing Calendar (Go to Encounters tab in chart review, and find the Anticoagulation Therapy Visit)    Spoke with patient. Gave them their lab results and new  warfarin recommendation.  No changes in health, medication, or diet. No missed doses, no falls. No signs or symptoms of bleed or clotting.     Will have pt take 12.5mg MF and 10mg ROW since pt wants to continue to eat more greens and will recheck an INR in two weeks    Noemi Morgan RN

## 2019-04-19 ENCOUNTER — ANTICOAGULATION THERAPY VISIT (OUTPATIENT)
Dept: ANTICOAGULATION | Facility: CLINIC | Age: 39
End: 2019-04-19

## 2019-04-19 DIAGNOSIS — I82.409 DEEP VEIN THROMBOSIS (DVT) (H): ICD-10-CM

## 2019-04-19 DIAGNOSIS — Z79.01 LONG TERM CURRENT USE OF ANTICOAGULANT THERAPY: ICD-10-CM

## 2019-04-19 DIAGNOSIS — D68.51 HOMOZYGOUS FACTOR V LEIDEN MUTATION (H): ICD-10-CM

## 2019-04-19 LAB — INR PPP: 1.9 (ref 0.86–1.14)

## 2019-04-19 PROCEDURE — 36416 COLLJ CAPILLARY BLOOD SPEC: CPT | Performed by: INTERNAL MEDICINE

## 2019-04-19 PROCEDURE — 85610 PROTHROMBIN TIME: CPT | Performed by: INTERNAL MEDICINE

## 2019-04-19 NOTE — PROGRESS NOTES
ANTICOAGULATION FOLLOW-UP CLINIC VISIT    Patient Name:  Benita Olivo  Date:  2019  Contact Type:  Telephone    SUBJECTIVE:        OBJECTIVE    INR   Date Value Ref Range Status   2019 1.90 (H) 0.86 - 1.14 Final       ASSESSMENT / PLAN  INR assessment THER    Recheck INR In: 2 WEEKS    INR Location Clinic      Anticoagulation Summary  As of 2019    INR goal:   2.0-3.0   TTR:   47.7 % (1.8 y)   INR used for dosin.90! (2019)   Warfarin maintenance plan:   10 mg (5 mg x 2) every day   Full warfarin instructions:   : 15 mg; : 12.5 mg; : 12.5 mg; : 12.5 mg; Otherwise 10 mg every day   Weekly warfarin total:   70 mg   Plan last modified:   Christy Santana RN (2019)   Next INR check:   5/10/2019   Priority:   INR   Target end date:       Indications    Deep vein thrombosis (DVT) (H) [I82.409] [I82.409]  Long-term (current) use of anticoagulants [Z79.01] [Z79.01]  Homozygous Factor V Leiden mutation (H) [D68.51]             Anticoagulation Episode Summary     INR check location:       Preferred lab:       Send INR reminders to:   [facility]    Comments:   patient contact number [phone]   name Will Prefers  for lab days and as infrequently as possible.      Anticoagulation Care Providers     Provider Role Specialty Phone number    Reed Garcia MD Responsible Hematology [phone]            See the Encounter Report to view Anticoagulation Flowsheet and Dosing Calendar (Go to Encounters tab in chart review, and find the Anticoagulation Therapy Visit)  Left message for patient with results and dosing recommendations. Asked patient to call back to report any missed doses, falls, signs and symptoms of bleeding or clotting, any changes in health, medication, or diet. Asked patient to call back with any questions or concerns.      Olga Young RN

## 2019-05-15 ENCOUNTER — ANTICOAGULATION THERAPY VISIT (OUTPATIENT)
Dept: ANTICOAGULATION | Facility: CLINIC | Age: 39
End: 2019-05-15

## 2019-05-15 ENCOUNTER — TELEPHONE (OUTPATIENT)
Dept: HEMATOLOGY | Facility: CLINIC | Age: 39
End: 2019-05-15

## 2019-05-15 DIAGNOSIS — D68.51 HOMOZYGOUS FACTOR V LEIDEN MUTATION (H): ICD-10-CM

## 2019-05-15 DIAGNOSIS — I82.409 DEEP VEIN THROMBOSIS (DVT) (H): ICD-10-CM

## 2019-05-15 NOTE — PROGRESS NOTES
5/17/19 Relayed INR results to the bleeding and clotting dept.  INR today is 1.4.  Ann will call this writer back with instructions on Enoxaparin and plan during pregnancy.  Werner Franks RN    Addendum 5/20/19 Patient is started on enoxaparin 100mg BID while patient is pregnant. [facility]                              ANTICOAGULATION FOLLOW-UP CLINIC VISIT    Patient Name:  Benita Olivo  Date:  5/15/2019  Contact Type:  Telephone    SUBJECTIVE:  Patient Findings     Positives:   Change in health    Comments:   Call rec'd today from Ann in [place] for Bleeding & Clotting - he states the pt called him to report she is pregnant.  Pt will be transitioned from warfarin to lovenox after Ann has a chance to speak with Dr. Garcia.  He advised the pt to get an INR today or on 5/16.  Please call Ann with this result- Bleeding & Clotting personnel will be advising pt re: the transition plan to lovenox once the INR result is known.        Clinical Outcomes     Comments:   Call rec'd today from Ann in [place] for [facility]eding [facility] Clotting - he states the pt called him to report she is pregnant.  Pt will be transitioned from warfarin to lovenox after Ann has a chance to speak with Dr. Garcia.  He advised the pt to get an INR today or on 5/16.  Please call Ann with this result- Bleeding & Clotting personnel will be advising pt re: the transition plan to lovenox once the INR result is known.           OBJECTIVE    INR   Date Value Ref Range Status   04/19/2019 1.90 (H) 0.86 - 1.14 Final       ASSESSMENT / PLAN  No question data found.  Anticoagulation Summary  As of 5/15/2019    INR goal:   2.0-3.0   TTR:   47.7 % (1.8 y)   INR used for dosing:   No new INR was available at the time of this encounter.   Warfarin maintenance plan:   10 mg (5 mg x 2) every day   Full warfarin instructions:   10 mg every day   Weekly warfarin total:   70 mg   Plan last modified:   Christy Santana RN (1/31/2019)   Next INR check:   5/15/2019    Priority:   INR   Target end date:       Indications    Deep vein thrombosis (DVT) (H) [I82.409] [I82.409]  Long-term (current) use of anticoagulants [Z79.01] [Z79.01]  Homozygous Factor V Leiden mutation (H) [D68.51]             Anticoagulation Episode Summary     INR check location:       Preferred lab:       Send INR reminders to:   [facility]    Comments:   patient contact number [phone]   name Rigo. Prefers Fridays for lab days and as infrequently as possible.      Anticoagulation Care Providers     Provider Role Specialty Phone number    Reed Garcia MD Responsible Hematology [phone]            See the Encounter Report to view Anticoagulation Flowsheet and Dosing Calendar (Go to Encounters tab in chart review, and find the Anticoagulation Therapy Visit)    See above notation     Leyla Navarro RN

## 2019-05-15 NOTE — TELEPHONE ENCOUNTER
Mrs. Olivo is a 38 year old woman with a history of recurrent VTE who is homozygous for Factor V Leiden on long term anticoagulation. She calls the Center today to report a positive home pregnancy test. Anticoagulation during past pregnancies has been accomplished by therapeutic enoxaparin. She has not had an INR checked for 4 weeks so she will have that checked as soon as possible. I will talk to Dr. Garcia regarding the plan.

## 2019-05-16 DIAGNOSIS — D68.51 HOMOZYGOUS FACTOR V LEIDEN MUTATION (H): ICD-10-CM

## 2019-05-16 DIAGNOSIS — Z79.01 LONG TERM CURRENT USE OF ANTICOAGULANT THERAPY: ICD-10-CM

## 2019-05-16 DIAGNOSIS — I82.409 DEEP VEIN THROMBOSIS (DVT) (H): ICD-10-CM

## 2019-05-16 LAB — INR PPP: 1.4 (ref 0.86–1.14)

## 2019-05-16 PROCEDURE — 85610 PROTHROMBIN TIME: CPT | Performed by: INTERNAL MEDICINE

## 2019-05-16 PROCEDURE — 36416 COLLJ CAPILLARY BLOOD SPEC: CPT | Performed by: INTERNAL MEDICINE

## 2019-05-17 ENCOUNTER — TELEPHONE (OUTPATIENT)
Dept: HEMATOLOGY | Facility: CLINIC | Age: 39
End: 2019-05-17

## 2019-05-24 ENCOUNTER — OFFICE VISIT (OUTPATIENT)
Dept: HEMATOLOGY | Facility: CLINIC | Age: 39
End: 2019-05-24
Attending: PHYSICIAN ASSISTANT
Payer: COMMERCIAL

## 2019-05-24 ENCOUNTER — TELEPHONE (OUTPATIENT)
Dept: HEMATOLOGY | Facility: CLINIC | Age: 39
End: 2019-05-24

## 2019-05-24 VITALS
RESPIRATION RATE: 12 BRPM | HEART RATE: 72 BPM | TEMPERATURE: 97.7 F | OXYGEN SATURATION: 99 % | HEIGHT: 69 IN | WEIGHT: 219 LBS | DIASTOLIC BLOOD PRESSURE: 73 MMHG | BODY MASS INDEX: 32.44 KG/M2 | SYSTOLIC BLOOD PRESSURE: 122 MMHG

## 2019-05-24 DIAGNOSIS — Z86.718 PERSONAL HISTORY OF DVT (DEEP VEIN THROMBOSIS): ICD-10-CM

## 2019-05-24 DIAGNOSIS — Z3A.01 LESS THAN 8 WEEKS GESTATION OF PREGNANCY: ICD-10-CM

## 2019-05-24 DIAGNOSIS — Z79.01 CHRONIC ANTICOAGULATION: Primary | ICD-10-CM

## 2019-05-24 DIAGNOSIS — D68.51 HOMOZYGOUS FACTOR V LEIDEN MUTATION (H): ICD-10-CM

## 2019-05-24 DIAGNOSIS — Z79.01 CHRONIC ANTICOAGULATION: ICD-10-CM

## 2019-05-24 LAB — LMWH PPP CHRO-ACNC: 1.31 IU/ML

## 2019-05-24 PROCEDURE — G0463 HOSPITAL OUTPT CLINIC VISIT: HCPCS

## 2019-05-24 PROCEDURE — 99213 OFFICE O/P EST LOW 20 MIN: CPT | Performed by: PHYSICIAN ASSISTANT

## 2019-05-24 PROCEDURE — 85520 HEPARIN ASSAY: CPT | Performed by: PHYSICIAN ASSISTANT

## 2019-05-24 ASSESSMENT — MIFFLIN-ST. JEOR: SCORE: 1737.76

## 2019-05-24 ASSESSMENT — PAIN SCALES - GENERAL: PAINLEVEL: NO PAIN (0)

## 2019-05-24 NOTE — PATIENT INSTRUCTIONS
1. Continue anticoagulation with therapeutic enoxaparin.  2. We will check Heparin levels every trimester and with dosing changes if neded.  3. Return to clinic at 30- 32 weeks gestation to discuss anticoagulation around delivery.

## 2019-05-24 NOTE — PROGRESS NOTES
Mrs. Olivo is a 38 year old woman who is homozygous for Factor V Leiden with a history of recurrent VTE on long term anticoagulation. She recently identified that she is pregnant and called the Center to be transitioned from Coumadin to enoxaparin. She reported a weight of 212 pounds so Coumadin was discontinued and enoxaparin 100 mg subcutaneously every 12 hours was initiated. After discussion with Himanshu Merlos PA-C it was decided to continue anticoagulation with enoxaparin.   Enoxaparin reviewed and subcutaneous injections discussed.  Signs and symptoms of VTE reveiwed.  Signs and symptoms of bleeding also discussed.  Plan for monitoring heparin Xa levels established.  Mrs. Olivo has the contact information for the Center and was encouraged to call with any questions or concerns.

## 2019-05-24 NOTE — TELEPHONE ENCOUNTER
Mrs. Olivo is a 38 year old woman homozygous for Factor V Leiden with history of recurrent VTE on anticoagulation currently 5-6 weeks pregnant. Her heparin Xa on 100 mg enoxaparin every 12 hours was 1.31 international unit(s)/kg. Dose of enoxaparin changed to 90 mg every 12 hours. Will recheck heparin Xa next trimester. Benita in agreement with plan. Discussed how to achieve 90 mg dose using 100 mg syringe.

## 2019-05-24 NOTE — PROGRESS NOTES
[facility]  [address] [place] [id number]  Main: [phone], Fax: [phone]    Patient seen at: [facility] at [address] [place] [address]    Outpatient Visit Note:    Patient: Benita Olivo  MRN: [id number]  : 1980  ASH: May 24, 2019    Reason:  History of recurrent venous thromboembolism. Homozygous factor V Leiden Mutation. On Chronic anticoagulation therapy. Newly found that she is pregnant.     Clinical History Summary:  Benita Olivo is a 37-year-old woman with history of recurrent venous thrombosis who is also homozygous for factor V Leiden.  She returns today to discuss anticoagulation therapy management during this current newly found pregnancy. Benita is currently about 5-6 weeks gestation. She is planning to deliver this child here at the [facility]. This is her third child.  We were also involved with the management of her previous pregnancies.     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  Her first clotting event occurred in 2005 when she presented with right upper extremity deep vein thrombosis with associated pulmonary emboli.  This was a relatively unprovoked clot.  She had been intermittently using oral contraceptives for about 2 years prior to that event, and those were subsequently stopped.  However, it was not entirely clear if the event was provoked only by estrogen.  Subsequent testing revealed that she is homozygous for factor V Leiden.  At that time we had recommended strong consideration of longterm anticoagulation, but she chose to go off anticoagulant therapy after about 12 months.      She returned in  with what was felt to be an unprovoked recurrent clot in the left upper extremity.  In addition, there was a strong family history of venous thromboembolism on her mother's side of the family, including a fatal pulmonary  "embolism in her maternal grandmother.  Thus, we again recommended longterm anticoagulation, and she has remained on that since that time without any difficulty.  She has done well on warfarin over the years.      As with her previous pregnancy, she has been managed with enoxaparin.  She did require to have her Enoxaparin dosing up adjusted at around her third trimester during her last pregnancy. She eventually underwent a scheduled  delivery with her last pregnancy where she stopped her Enoxaparin 24 hours prior to her  delivery. She reports no complications with that delivery. She reports that she eventually switched back to warfarin in  after she was done with breast feeding.     Interim History:  She called our center about one week ago and informed us that she is again pregnant at around 4 weeks. She was instructed to switched over from Warfarin to Enoxaparin at 100 mg SubQ Q 12 hours (which is 1 mg/kg Q 12 hours). She has been on Enoxaparin for the past 7 days. She denies any bleeding complications while on Warfarin in the past few months and her INR were mostly within the therapeutic range of 2.0-3.0. In the past 7 days while on Enoxaparin, she has no issues with bleeding complications and denies any significant bruising at the Enoxaparin injection sites.     Medications, Allergies, and PmHx:  All have been reviewed by this writer in the electronic medical record.    ROS:  Denies any swelling of the extremities. Denies any bleeding issues. No frequent epistaxis. Denies any oral mucosal bleeding. Denies any hematuria or blood in stools. No shortness of breath. No chest pain.     Objective:  Pleasant in no acute distress.  Vitals: /73 (BP Location: Right arm, Patient Position: Sitting, Cuff Size: Adult Regular)   Pulse 72   Temp 97.7  F (36.5  C) (Oral)   Resp 12   Ht 1.753 m (5' 9\")   Wt 99.3 kg (219 lb)   SpO2 99%   BMI 32.34 kg/m    Exam:  Complete exam is not " performed today.    Labs:  Component      Latest Ref Rng & Units 2019 1/10/2019 1/15/2019 2019   INR      0.86 - 1.14 1.40 (H) 2.00 (H) 3.80 (H) 3.80 (H)     Component      Latest Ref Rng & Units 2019 3/5/2019 3/25/2019 2019   INR      0.86 - 1.14 3.10 (H) 2.60 (H) 1.70 (H) 2.00 (H)     Component      Latest Ref Rng & Units 2019   INR      0.86 - 1.14 1.90 (H) 1.40 (H)     Assessment:  In summary, Benita is a 38 year old female with a history of recurrent upper extremity DVT and pulmonary embolism who is also found to have homozygous factor V Leiden mutation who also has a strong family history of venous thromboembolism including a fatal pulmonary embolism in her maternal grandmother, who has been on extended (life-long) anticoagulation therapy, returns to clinic today to discuss anticoagulation management with her current pregnancy. She is currently pregnant at around 5-6 weeks gestation. She has started her Enoxaparin injections as of 7 days ago without any issues.    Diagnosis:  1. History of recurrent upper extremity DVT and pulmonary embolism.   2. Homozygous factor V Leiden Mutation.   3. Currently about 5-6 weeks pregnant.    Plan:  Plan to continue with full therapeutic anticoagulation therapy for the duration of her pregnancy. She is already on Enoxaparin at 100  mg SubQ Q 12 hours (which is 1 mg/kg SubQ Q 12 hours). We will plan to check her Heparin Xa level today and adjust her dose if necessary. Will plan to check Heparin Xa levels at the beginning of each trimester. Note that Benita did require to up adjust her Enoxaparin dose during the third trimester of her last pregnancy.     Note that she had a  delivery with her last pregnancy. It is unclear as of now if she will have another  or vaginal delivery. I will have the patient return at around 30-32 weeks gestation to finalize her delivery plan.     Will need to also plan to have her return to clinic a  few weeks post partum to discuss ongoing oral anticoagulation therapy. She had been on Warfarin for years and seems like she is very comfortable with it. Dr. Garcia did talk to her in the past about direct oral anticoagulant (DOAGs) for which she was not interested at the time.     She has our clinic's contact information and is instructed to call if she should have any further questions or concerns.    Ann Torres, nurse clinician is present throughout the entire clinic visit.    Total Time Spent:  23 minutes, all 23 minutes was spent on face-to-face consultation with the patient and coordination of care in regard to her history of recurrent venous thromboembolism, homozygous factor V Leiden mutation and anticoagulation therapy management during her current pregnancy.    Time IN: 11:12  Time OUT: 11:35      REDD Sales PA-C  Physician Assistant  [facility].     Addendum 5/24/2019 at 13:20:    Labs:  Component      Latest Ref Rng & Units 5/24/2019   Heparin 10A Level      IU/mL 1.31 ()     Will decrease her Enoxaparin dose to 90 mg SubQ Q 12 hours. Recheck Heparin 10A level at the beginning of her 2nd trimester.      REDD Sales PA-C  Physician Assistant  [facility].

## 2019-06-13 ENCOUNTER — TRANSCRIBE ORDERS (OUTPATIENT)
Dept: OBGYN | Facility: CLINIC | Age: 39
End: 2019-06-13

## 2019-06-13 ENCOUNTER — OFFICE VISIT (OUTPATIENT)
Dept: OBGYN | Facility: CLINIC | Age: 39
End: 2019-06-13
Attending: ADVANCED PRACTICE MIDWIFE
Payer: COMMERCIAL

## 2019-06-13 ENCOUNTER — ANCILLARY PROCEDURE (OUTPATIENT)
Dept: ULTRASOUND IMAGING | Facility: CLINIC | Age: 39
End: 2019-06-13
Attending: ADVANCED PRACTICE MIDWIFE
Payer: COMMERCIAL

## 2019-06-13 VITALS
BODY MASS INDEX: 32.75 KG/M2 | HEIGHT: 69 IN | WEIGHT: 221.1 LBS | DIASTOLIC BLOOD PRESSURE: 67 MMHG | HEART RATE: 83 BPM | SYSTOLIC BLOOD PRESSURE: 98 MMHG

## 2019-06-13 DIAGNOSIS — O09.299 HISTORY OF MACROSOMIA IN INFANT IN PRIOR PREGNANCY, CURRENTLY PREGNANT: ICD-10-CM

## 2019-06-13 DIAGNOSIS — Z36.89 ENCOUNTER FOR FETAL ANATOMIC SURVEY: ICD-10-CM

## 2019-06-13 DIAGNOSIS — Z98.891 S/P CESAREAN SECTION: ICD-10-CM

## 2019-06-13 DIAGNOSIS — Z79.01 LONG TERM CURRENT USE OF ANTICOAGULANT THERAPY: ICD-10-CM

## 2019-06-13 DIAGNOSIS — O26.90 PREGNANCY RELATED CONDITION, ANTEPARTUM: Primary | ICD-10-CM

## 2019-06-13 DIAGNOSIS — Z34.91 ENCOUNTER FOR SUPERVISION OF NORMAL PREGNANCY IN FIRST TRIMESTER, UNSPECIFIED GRAVIDITY: ICD-10-CM

## 2019-06-13 DIAGNOSIS — O09.529 SUPERVISION OF HIGH-RISK PREGNANCY OF ELDERLY MULTIGRAVIDA: Primary | ICD-10-CM

## 2019-06-13 PROCEDURE — G0463 HOSPITAL OUTPT CLINIC VISIT: HCPCS | Mod: 25,ZF

## 2019-06-13 PROCEDURE — 76801 OB US < 14 WKS SINGLE FETUS: CPT

## 2019-06-13 ASSESSMENT — MIFFLIN-ST. JEOR: SCORE: 1747.28

## 2019-06-13 NOTE — PROGRESS NOTES
[facility] OB Intake note  Subjective   38 year old woman presents to clinic for initiation of OB care. Patient's last menstrual period was 2019.  at 8w4d by Estimated Date of Delivery: 2020 based on LMP, c/w dating ultrasound. Reviewed dating ultrasound. Pregnancy is planned.      Symptoms since LMP include nausea. Patient has tried these relief measures: diet modification, small frequent meals, increased rest and increased fluids.    - Genetic/Infection questionnaire completed, risks include: homozygous factor V. Pt  does not have a recent known exposure to Parvo or CMV so IgG/IgM testing WILL NOT be ordered.   Have you traveled during the pregnancy? No  Have your sexual partner(s) travelled during the pregnancy? No    OTHER:  - 2016 @ 39+6- scheduled induction of labor-   - 18 @ 39+0- scheduled Low transverse  section d/t LGA, projected >5000gm  - Hx of macrosomia- 4.593kg infant    - On chronic anticoagulation therapy  - - Factor V deficiency- homozygous- Mother, father, sister and brother with factor V   - Paternal grandmother had embolism   - Maternal uncle and paternal uncle with blood clots  - - Hx of pulmonary embolism in  when on OCPs  - - DVT of upper extremity- right arm     Hematology appointment on 19:  - Had appt  at approx 5-6 weeks g.a.   - On warfarin outside of pregnancy, on enoxaparin during (stopped 24 hours prior to last scheduled c/s)  - Last on warfarin as of 2018 when done breastfeeding  - Switched from warfarin to enoxaparin 90 mg subcutaneous [id number]  - Check Heparin Xa levels at beginning of each trimester- will need to adjust her dose during 3rd trimester  - Unclear if will have another c/s- will see heme around 30-32 weeks  - Will need to return postpartum     - History of abnormal pap   - 2012- conization by Dr. Fernandez   - cervical adenocarcinoma s/p cone excision with negative margins   Cervix 3.29cm on dating ultrasound  today  - BMI >30  - Rh negative    - Current Medications    Current Outpatient Medications   Medication Sig Dispense Refill     enoxaparin (LOVENOX) 100 MG/ML syringe Inject 90 mg enoxaparin every 12 hours. 60 Syringe 6     Prenatal Vit-Fe Fumarate-FA (PRENATAL VITAMIN PO)        acetaminophen (TYLENOL) 325 MG tablet Take 2 tablets (650 mg) by mouth every 4 hours as needed for mild pain (Patient not taking: Reported on 6/13/2019) 100 tablet      VITAMIN D, CHOLECALCIFEROL, PO Take 5,000 Units by mouth daily           - Co-morbids    Past Medical History:   Diagnosis Date     Abnormal Pap smear of cervix 2012    conization Dr. Fernandez.  f/u 2013, 2014paps normal      ASCUS on Pap smear 6/2012    + hpv,     BMI 32.0-32.9,adult      DVT of upper extremity (deep vein thrombosis) (H) 2011    right arm      Factor V deficiency (H) 2005    homozygous      Mass 2011    URETHRAl mass      PE (pulmonary embolism) 2005    was on birth control at time, hosp x 4-5 d      - Risk for GDM -  does  have BMI>30, h/o macrosomic infant (>5000gm)   WILL have an early GCT and possible Hgb A1C    - High Risk for Pre E- meets one of following criteria The patient does not h/o Pre Eclampsia, Current multi fetal gestation, Pre Gestational Diabetes (Type 1 or Type 2), chronic hypertension, renal disease, Autoimmune disease (systematic lupus erythematosus, antiphospholipid syndrome) so WILL NOT start low dose aspirin (81mg) starting between 12 and 28 weeks to prevent early onset preeclampsia.    - Moderate risk - meets more than one of several moderate risk facrtors for Pre E - Nulliparity, Obesity (body mass index >30 kg/m2),Family history of preeclampsia (mother or sister), Sociodemographic characteristics ( race, low socioeconomic status), Age ?35 years, Personal history factors (e.g., low birthweight or small for gestational age, previous adverse pregnancy outcome, >10-year pregnancy interval)  so WILL NOT consider starting  low dose aspirin (81mg) starting between 12 and 28 weeks to prevent early onset preeclampsia.    - The patient  does not have a history of spontaneous  birth so  WILL NOT consider progesterone starting at 16-20 weeks and/or serial transvaginal cervical length ultrasounds from 16-24 weeks.     -The patient does not have a history of immunosuppresion or HIV so Toxoplasma IgG/IgM WILL NOT be ordered.     PERSONAL/SOCIAL HISTORY  -  Rigo   Employment: Full time. Her job involves light activity .  History of anxiety or depression  denies-  Additional items: Denies past or present domestic violence, sexual and psychological abuse.    Objective  -VS: reviewed and within normal limits   -General appearance: no acute distress, patient is comfortable   NEUROLOGICAL/PSYCHIATRIC   - Oriented x3,   -Mood and affect: : normal     Last pap: 17- NIL, neg HPV    Assessment/Plan  Benita was seen today for prenatal care.    Diagnoses and all orders for this visit:    Supervision of high-risk pregnancy of elderly multigravida  -     Cancel: ABO/Rh Type and Screen  -     Cancel: CBC with Platelets  -     Cancel: 25- OH-Vitamin D  -     Cancel: Treponema Abs w Reflex to RPR and Titer  -     Cancel: Hepatitis B Surface Antigen  -     Cancel: Hepatitis B Surface Antibody  -     Cancel: Rubella Antibody IgG Quantitative  -     Cancel: HIV Antigen Antibody Combo  -     Cancel: Hepatitis C antibody  -     Cancel: Urine Culture Aerobic Bacterial  -     Cancel: Hgb A1c  -     MAT FETAL MED CTR REFERRAL-PREGNANCY  -     ABO/Rh type and screen; Future  -     CBC with platelets; Future  -     Vitamin D Deficiency; Future  -     Treponema Abs w Reflex to RPR and Titer; Future  -     Hepatitis B surface antigen; Future  -     Hepatitis B Surface Antibody; Future  -     Rubella Antibody IgG Quantitative; Future  -     HIV Antigen Antibody Combo; Future  -     Hepatitis C antibody; Future  -     Urine Culture Aerobic  Bacterial; Future  -     Hemoglobin A1c; Future    Encounter for fetal anatomic survey  -     MAT FETAL MED CTR REFERRAL-PREGNANCY  -     ABO/Rh type and screen; Future  -     CBC with platelets; Future  -     Vitamin D Deficiency; Future  -     Treponema Abs w Reflex to RPR and Titer; Future  -     Hepatitis B surface antigen; Future  -     Hepatitis B Surface Antibody; Future  -     Rubella Antibody IgG Quantitative; Future  -     HIV Antigen Antibody Combo; Future  -     Hepatitis C antibody; Future  -     Urine Culture Aerobic Bacterial; Future  -     Hemoglobin A1c; Future    Long-term (current) use of anticoagulants [Z79.01]    S/P  section    History of macrosomia in infant in prior pregnancy, currently pregnant    BMI 32.0-32.9,adult      38 year old  8w4d weeks of pregnancy with RIVAS of 2020 by LMP of Patient's last menstrual period was 2019.. Ultrasound confirms.   Outpatient Encounter Medications as of 2019   Medication Sig Dispense Refill     enoxaparin (LOVENOX) 100 MG/ML syringe Inject 90 mg enoxaparin every 12 hours. 60 Syringe 6     Prenatal Vit-Fe Fumarate-FA (PRENATAL VITAMIN PO)        acetaminophen (TYLENOL) 325 MG tablet Take 2 tablets (650 mg) by mouth every 4 hours as needed for mild pain (Patient not taking: Reported on 2019) 100 tablet      VITAMIN D, CHOLECALCIFEROL, PO Take 5,000 Units by mouth daily       No facility-administered encounter medications on file as of 2019.       Orders Placed This Encounter   Procedures     CBC with platelets     Vitamin D Deficiency     Treponema Abs w Reflex to RPR and Titer     Hepatitis B surface antigen     Hepatitis B Surface Antibody     Rubella Antibody IgG Quantitative     HIV Antigen Antibody Combo     Hepatitis C antibody     Hemoglobin A1c     [facility] FETAL MED CTR REFERRAL-PREGNANCY     ABO/Rh type and screen                 Orders Placed This Encounter   Procedures     CBC with platelets     Vitamin D  Deficiency     Treponema Abs w Reflex to RPR and Titer     Hepatitis B surface antigen     Hepatitis B Surface Antibody     Rubella Antibody IgG Quantitative     HIV Antigen Antibody Combo     Hepatitis C antibody     Hemoglobin A1c     MAT FETAL MED CTR REFERRAL-PREGNANCY     ABO/Rh type and screen     - Oriented to Practice, types of care, and how to reach a provider.  MD care.   - Patient received 1st trimester new OB education packet complete with aide of [employer] Expect[facility] booklet including information on genetic screening test options.  - Patient desires level II ultrasound which was ordered.  - Educational handout on the prevention of infections diseases during pregnancy provided.    - Pregnancy concerns to be addressed by provider at new OB exam include: hx of conization- needs cervical length us? Anticoagulation, prior c/s, hx of macrosomia and bmi >30- needs early 1 hour glucose.    - OBI education reviewed.  - OBI labs ordered.  - Needs GC/CT at NOB.   - Reviewed risk for diabetes d/t BMI >30 and hx of macrosomia. HbgA1c added to labs. Discussed completing early 1 hour glucose at NOB.  - Reviewed hematology notes. See above. Continue care with heme.  - Will need planned delivery in order to stop anticoagulation 24 hours prior. Pt undecided on repeat  section or scheduled IOL.    - Reviewed history of cervical conization and recommendation for serial cervical lengths. Will review with MD at next visit.  Cervical length today wnl at 3.29cm.  - Desires level 2 ultrasound only, MFM referral placed.    Pt to RTO for NOB visit in 2 weeks and prn if questions or concerns    Louisa Roche CNM

## 2019-06-13 NOTE — LETTER
2019       RE: Benita Olivo  [address] [facility] [address]     Dear Colleague,    Thank you for referring your patient, Benita Olivo, to the [facility] at [facility]. Please see a copy of my visit note below.    [facility] OB Intake note  Subjective   38 year old woman presents to clinic for initiation of OB care. Patient's last menstrual period was 2019.  at 8w4d by Estimated Date of Delivery: 2020 based on LMP, c/w dating ultrasound. Reviewed dating ultrasound. Pregnancy is planned.      Symptoms since LMP include nausea. Patient has tried these relief measures: diet modification, small frequent meals, increased rest and increased fluids.    - Genetic/Infection questionnaire completed, risks include: homozygous factor V. Pt  does not have a recent known exposure to Parvo or CMV so IgG/IgM testing WILL NOT be ordered.   Have you traveled during the pregnancy? No  Have your sexual partner(s) travelled during the pregnancy? No    OTHER:  - 2016 @ 39+6- scheduled induction of labor-   - 18 @ 39+0- scheduled Low transverse  section d/t LGA, projected >5000gm  - Hx of macrosomia- 4.593kg infant    - On chronic anticoagulation therapy  - - Factor V deficiency- homozygous- Mother, father, sister and brother with factor V   - Paternal grandmother had embolism   - Maternal uncle and paternal uncle with blood clots  - - Hx of pulmonary embolism in  when on OCPs  - - DVT of upper extremity- right arm     Hematology appointment on 19:  - Had appt  at approx 5-6 weeks g.a.   - On warfarin outside of pregnancy, on enoxaparin during (stopped 24 hours prior to last scheduled c/s)  - Last on warfarin as of 2018 when done breastfeeding  - Switched from warfarin to enoxaparin 90 mg subcutaneous [id number]  - Check Heparin Xa levels at beginning of each trimester- will need to adjust her dose  during 3rd trimester  - Unclear if will have another c/s- will see heme around 30-32 weeks  - Will need to return postpartum     - History of abnormal pap   - 6/2012- conization by Dr. Fernandez   - cervical adenocarcinoma s/p cone excision with negative margins   Cervix 3.29cm on dating ultrasound today  - BMI >30  - Rh negative    - Current Medications    Current Outpatient Medications   Medication Sig Dispense Refill     enoxaparin (LOVENOX) 100 MG/ML syringe Inject 90 mg enoxaparin every 12 hours. 60 Syringe 6     Prenatal Vit-Fe Fumarate-FA (PRENATAL VITAMIN PO)        acetaminophen (TYLENOL) 325 MG tablet Take 2 tablets (650 mg) by mouth every 4 hours as needed for mild pain (Patient not taking: Reported on 6/13/2019) 100 tablet      VITAMIN D, CHOLECALCIFEROL, PO Take 5,000 Units by mouth daily           - Co-morbids    Past Medical History:   Diagnosis Date     Abnormal Pap smear of cervix 2012    conization Dr. Fernandez.  f/u 2013, 2014paps normal      ASCUS on Pap smear 6/2012    + hpv,     BMI 32.0-32.9,adult      DVT of upper extremity (deep vein thrombosis) (H) 2011    right arm      Factor V deficiency (H) 2005    homozygous      Mass 2011    URETHRAl mass      PE (pulmonary embolism) 2005    was on birth control at time, hosp x 4-5 d      - Risk for GDM -  does  have BMI>30, h/o macrosomic infant (>5000gm)   WILL have an early GCT and possible Hgb A1C    - High Risk for Pre E- meets one of following criteria The patient does not h/o Pre Eclampsia, Current multi fetal gestation, Pre Gestational Diabetes (Type 1 or Type 2), chronic hypertension, renal disease, Autoimmune disease (systematic lupus erythematosus, antiphospholipid syndrome) so WILL NOT start low dose aspirin (81mg) starting between 12 and 28 weeks to prevent early onset preeclampsia.    - Moderate risk - meets more than one of several moderate risk facrtors for Pre E - Nulliparity, Obesity (body mass index >30 kg/m2),Family history of  preeclampsia (mother or sister), Sociodemographic characteristics ( race, low socioeconomic status), Age ?35 years, Personal history factors (e.g., low birthweight or small for gestational age, previous adverse pregnancy outcome, >10-year pregnancy interval)  so WILL NOT consider starting low dose aspirin (81mg) starting between 12 and 28 weeks to prevent early onset preeclampsia.    - The patient  does not have a history of spontaneous  birth so  WILL NOT consider progesterone starting at 16-20 weeks and/or serial transvaginal cervical length ultrasounds from 16-24 weeks.     -The patient does not have a history of immunosuppresion or HIV so Toxoplasma IgG/IgM WILL NOT be ordered.     PERSONAL/SOCIAL HISTORY  -  Rigo   Employment: Full time. Her job involves light activity .  History of anxiety or depression  denies-  Additional items: Denies past or present domestic violence, sexual and psychological abuse.    Objective  -VS: reviewed and within normal limits   -General appearance: no acute distress, patient is comfortable   NEUROLOGICAL/PSYCHIATRIC   - Oriented x3,   -Mood and affect: : normal     Last pap: 17- NIL, neg HPV    Assessment/Plan  Benita was seen today for prenatal care.    Diagnoses and all orders for this visit:    Supervision of high-risk pregnancy of elderly multigravida  -     Cancel: ABO/Rh Type and Screen  -     Cancel: CBC with Platelets  -     Cancel: 25- OH-Vitamin D  -     Cancel: Treponema Abs w Reflex to RPR and Titer  -     Cancel: Hepatitis B Surface Antigen  -     Cancel: Hepatitis B Surface Antibody  -     Cancel: Rubella Antibody IgG Quantitative  -     Cancel: HIV Antigen Antibody Combo  -     Cancel: Hepatitis C antibody  -     Cancel: Urine Culture Aerobic Bacterial  -     Cancel: Hgb A1c  -     MAT FETAL MED CTR REFERRAL-PREGNANCY  -     ABO/Rh type and screen; Future  -     CBC with platelets; Future  -     Vitamin D Deficiency;  Future  -     Treponema Abs w Reflex to RPR and Titer; Future  -     Hepatitis B surface antigen; Future  -     Hepatitis B Surface Antibody; Future  -     Rubella Antibody IgG Quantitative; Future  -     HIV Antigen Antibody Combo; Future  -     Hepatitis C antibody; Future  -     Urine Culture Aerobic Bacterial; Future  -     Hemoglobin A1c; Future    Encounter for fetal anatomic survey  -     MAT FETAL MED CTR REFERRAL-PREGNANCY  -     ABO/Rh type and screen; Future  -     CBC with platelets; Future  -     Vitamin D Deficiency; Future  -     Treponema Abs w Reflex to RPR and Titer; Future  -     Hepatitis B surface antigen; Future  -     Hepatitis B Surface Antibody; Future  -     Rubella Antibody IgG Quantitative; Future  -     HIV Antigen Antibody Combo; Future  -     Hepatitis C antibody; Future  -     Urine Culture Aerobic Bacterial; Future  -     Hemoglobin A1c; Future    Long-term (current) use of anticoagulants [Z79.01]    S/P  section    History of macrosomia in infant in prior pregnancy, currently pregnant    BMI 32.0-32.9,adult      38 year old  8w4d weeks of pregnancy with RIVAS of 2020 by LMP of Patient's last menstrual period was 2019.. Ultrasound confirms.   Outpatient Encounter Medications as of 2019   Medication Sig Dispense Refill     enoxaparin (LOVENOX) 100 MG/ML syringe Inject 90 mg enoxaparin every 12 hours. 60 Syringe 6     Prenatal Vit-Fe Fumarate-FA (PRENATAL VITAMIN PO)        acetaminophen (TYLENOL) 325 MG tablet Take 2 tablets (650 mg) by mouth every 4 hours as needed for mild pain (Patient not taking: Reported on 2019) 100 tablet      VITAMIN D, CHOLECALCIFEROL, PO Take 5,000 Units by mouth daily       No facility-administered encounter medications on file as of 2019.       Orders Placed This Encounter   Procedures     CBC with platelets     Vitamin D Deficiency     Treponema Abs w Reflex to RPR and Titer     Hepatitis B surface antigen      Hepatitis B Surface Antibody     Rubella Antibody IgG Quantitative     HIV Antigen Antibody Combo     Hepatitis C antibody     Hemoglobin A1c     MAT FETAL MED CTR REFERRAL-PREGNANCY     ABO/Rh type and screen                 Orders Placed This Encounter   Procedures     CBC with platelets     Vitamin D Deficiency     Treponema Abs w Reflex to RPR and Titer     Hepatitis B surface antigen     Hepatitis B Surface Antibody     Rubella Antibody IgG Quantitative     HIV Antigen Antibody Combo     Hepatitis C antibody     Hemoglobin A1c     MAT FETAL MED CTR REFERRAL-PREGNANCY     ABO/Rh type and screen     - Oriented to Practice, types of care, and how to reach a provider.  MD care.   - Patient received 1st trimester new OB education packet complete with aide of [employer] booklet including information on genetic screening test options.  - Patient desires level II ultrasound which was ordered.  - Educational handout on the prevention of infections diseases during pregnancy provided.    - Pregnancy concerns to be addressed by provider at new OB exam include: hx of conization- needs cervical length us? Anticoagulation, prior c/s, hx of macrosomia and bmi >30- needs early 1 hour glucose.    - OBI education reviewed.  - OBI labs ordered.  - Needs GC/CT at NOB.   - Reviewed risk for diabetes d/t BMI >30 and hx of macrosomia. HbgA1c added to labs. Discussed completing early 1 hour glucose at NOB.  - Reviewed hematology notes. See above. Continue care with heme.  - Will need planned delivery in order to stop anticoagulation 24 hours prior. Pt undecided on repeat  section or scheduled IOL.    - Reviewed history of cervical conization and recommendation for serial cervical lengths. Will review with MD at next visit.  Cervical length today wnl at 3.29cm.  - Desires level 2 ultrasound only, M referral placed.    Pt to RTO for NOB visit in 2 weeks and prn if questions or concerns    Louisa Roche CNM

## 2019-06-25 PROBLEM — O09.529 SUPERVISION OF HIGH-RISK PREGNANCY OF ELDERLY MULTIGRAVIDA: Status: RESOLVED | Noted: 2017-11-15 | Resolved: 2019-06-25

## 2019-06-25 PROBLEM — O44.42 LOW-LYING PLACENTA IN SECOND TRIMESTER: Status: RESOLVED | Noted: 2018-01-23 | Resolved: 2019-06-25

## 2019-06-25 PROBLEM — O43.129 VELAMENTOUS INSERTION OF UMBILICAL CORD: Status: RESOLVED | Noted: 2018-01-23 | Resolved: 2019-06-25

## 2019-06-25 PROBLEM — Z36.89 ENCOUNTER FOR TRIAGE IN PREGNANT PATIENT: Status: RESOLVED | Noted: 2018-06-17 | Resolved: 2019-06-25

## 2019-06-25 PROBLEM — O36.63X0 EXCESSIVE FETAL GROWTH AFFECTING MANAGEMENT OF PREGNANCY IN THIRD TRIMESTER: Status: RESOLVED | Noted: 2018-06-04 | Resolved: 2019-06-25

## 2019-06-25 PROBLEM — O09.299 HISTORY OF MACROSOMIA IN INFANT IN PRIOR PREGNANCY, CURRENTLY PREGNANT: Status: ACTIVE | Noted: 2019-06-25

## 2019-07-18 DIAGNOSIS — O09.529 SUPERVISION OF HIGH-RISK PREGNANCY OF ELDERLY MULTIGRAVIDA: ICD-10-CM

## 2019-07-18 DIAGNOSIS — Z36.89 ENCOUNTER FOR FETAL ANATOMIC SURVEY: ICD-10-CM

## 2019-07-18 LAB
ABO + RH BLD: NORMAL
ABO + RH BLD: NORMAL
BLD GP AB SCN SERPL QL: NORMAL
BLOOD BANK CMNT PATIENT-IMP: NORMAL
ERYTHROCYTE [DISTWIDTH] IN BLOOD BY AUTOMATED COUNT: 12.9 % (ref 10–15)
HBA1C MFR BLD: 5.5 % (ref 0–5.6)
HCT VFR BLD AUTO: 37.6 % (ref 35–47)
HGB BLD-MCNC: 12.8 G/DL (ref 11.7–15.7)
MCH RBC QN AUTO: 31 PG (ref 26.5–33)
MCHC RBC AUTO-ENTMCNC: 34 G/DL (ref 31.5–36.5)
MCV RBC AUTO: 91 FL (ref 78–100)
PLATELET # BLD AUTO: 180 10E9/L (ref 150–450)
RBC # BLD AUTO: 4.13 10E12/L (ref 3.8–5.2)
SPECIMEN EXP DATE BLD: NORMAL
WBC # BLD AUTO: 9.7 10E9/L (ref 4–11)

## 2019-07-18 PROCEDURE — 85027 COMPLETE CBC AUTOMATED: CPT | Performed by: ADVANCED PRACTICE MIDWIFE

## 2019-07-18 PROCEDURE — 82306 VITAMIN D 25 HYDROXY: CPT | Performed by: ADVANCED PRACTICE MIDWIFE

## 2019-07-18 PROCEDURE — 86850 RBC ANTIBODY SCREEN: CPT | Performed by: ADVANCED PRACTICE MIDWIFE

## 2019-07-18 PROCEDURE — 87389 HIV-1 AG W/HIV-1&-2 AB AG IA: CPT | Performed by: ADVANCED PRACTICE MIDWIFE

## 2019-07-18 PROCEDURE — 87086 URINE CULTURE/COLONY COUNT: CPT | Performed by: ADVANCED PRACTICE MIDWIFE

## 2019-07-18 PROCEDURE — 86901 BLOOD TYPING SEROLOGIC RH(D): CPT | Performed by: ADVANCED PRACTICE MIDWIFE

## 2019-07-18 PROCEDURE — 36415 COLL VENOUS BLD VENIPUNCTURE: CPT | Performed by: ADVANCED PRACTICE MIDWIFE

## 2019-07-18 PROCEDURE — 86780 TREPONEMA PALLIDUM: CPT | Performed by: ADVANCED PRACTICE MIDWIFE

## 2019-07-18 PROCEDURE — 86900 BLOOD TYPING SEROLOGIC ABO: CPT | Performed by: ADVANCED PRACTICE MIDWIFE

## 2019-07-18 PROCEDURE — 86706 HEP B SURFACE ANTIBODY: CPT | Performed by: ADVANCED PRACTICE MIDWIFE

## 2019-07-18 PROCEDURE — 83036 HEMOGLOBIN GLYCOSYLATED A1C: CPT | Performed by: ADVANCED PRACTICE MIDWIFE

## 2019-07-18 PROCEDURE — 86803 HEPATITIS C AB TEST: CPT | Performed by: ADVANCED PRACTICE MIDWIFE

## 2019-07-18 PROCEDURE — 87340 HEPATITIS B SURFACE AG IA: CPT | Performed by: ADVANCED PRACTICE MIDWIFE

## 2019-07-18 PROCEDURE — 86762 RUBELLA ANTIBODY: CPT | Performed by: ADVANCED PRACTICE MIDWIFE

## 2019-07-19 ENCOUNTER — OFFICE VISIT (OUTPATIENT)
Dept: OBGYN | Facility: CLINIC | Age: 39
End: 2019-07-19
Attending: STUDENT IN AN ORGANIZED HEALTH CARE EDUCATION/TRAINING PROGRAM
Payer: COMMERCIAL

## 2019-07-19 VITALS
WEIGHT: 222 LBS | HEIGHT: 71 IN | SYSTOLIC BLOOD PRESSURE: 102 MMHG | HEART RATE: 79 BPM | BODY MASS INDEX: 31.08 KG/M2 | DIASTOLIC BLOOD PRESSURE: 70 MMHG

## 2019-07-19 DIAGNOSIS — O09.299 H/O MACROSOMIA IN INFANT IN PRIOR PREGNANCY, CURRENTLY PREGNANT: ICD-10-CM

## 2019-07-19 DIAGNOSIS — Z86.718 PERSONAL HISTORY OF DVT (DEEP VEIN THROMBOSIS): ICD-10-CM

## 2019-07-19 DIAGNOSIS — E55.9 VITAMIN D DEFICIENCY: ICD-10-CM

## 2019-07-19 DIAGNOSIS — D06.9 ADENOCARCINOMA IN SITU (AIS) OF UTERINE CERVIX: ICD-10-CM

## 2019-07-19 DIAGNOSIS — Z34.92 PRENATAL CARE IN SECOND TRIMESTER: Primary | ICD-10-CM

## 2019-07-19 LAB
BACTERIA SPEC CULT: NORMAL
DEPRECATED CALCIDIOL+CALCIFEROL SERPL-MC: 27 UG/L (ref 20–75)
HBV SURFACE AB SERPL IA-ACNC: 38.29 M[IU]/ML
HBV SURFACE AG SERPL QL IA: NONREACTIVE
HCV AB SERPL QL IA: NONREACTIVE
HIV 1+2 AB+HIV1 P24 AG SERPL QL IA: NONREACTIVE
RUBV IGG SERPL IA-ACNC: 25 IU/ML
SPECIMEN SOURCE: NORMAL
T PALLIDUM AB SER QL: NONREACTIVE

## 2019-07-19 PROCEDURE — 87591 N.GONORRHOEAE DNA AMP PROB: CPT | Performed by: OBSTETRICS & GYNECOLOGY

## 2019-07-19 PROCEDURE — 87491 CHLMYD TRACH DNA AMP PROBE: CPT | Performed by: OBSTETRICS & GYNECOLOGY

## 2019-07-19 PROCEDURE — G0463 HOSPITAL OUTPT CLINIC VISIT: HCPCS | Mod: ZF,25

## 2019-07-19 PROCEDURE — G0145 SCR C/V CYTO,THINLAYER,RESCR: HCPCS | Performed by: OBSTETRICS & GYNECOLOGY

## 2019-07-19 PROCEDURE — 87624 HPV HI-RISK TYP POOLED RSLT: CPT | Performed by: OBSTETRICS & GYNECOLOGY

## 2019-07-19 ASSESSMENT — PAIN SCALES - GENERAL: PAINLEVEL: NO PAIN (0)

## 2019-07-19 ASSESSMENT — ANXIETY QUESTIONNAIRES
7. FEELING AFRAID AS IF SOMETHING AWFUL MIGHT HAPPEN: NOT AT ALL
GAD7 TOTAL SCORE: 0
6. BECOMING EASILY ANNOYED OR IRRITABLE: NOT AT ALL
1. FEELING NERVOUS, ANXIOUS, OR ON EDGE: NOT AT ALL
3. WORRYING TOO MUCH ABOUT DIFFERENT THINGS: NOT AT ALL
2. NOT BEING ABLE TO STOP OR CONTROL WORRYING: NOT AT ALL
5. BEING SO RESTLESS THAT IT IS HARD TO SIT STILL: NOT AT ALL

## 2019-07-19 ASSESSMENT — PATIENT HEALTH QUESTIONNAIRE - PHQ9
5. POOR APPETITE OR OVEREATING: NOT AT ALL
SUM OF ALL RESPONSES TO PHQ QUESTIONS 1-9: 2

## 2019-07-19 ASSESSMENT — MIFFLIN-ST. JEOR: SCORE: 1778.12

## 2019-07-19 NOTE — LETTER
2019       RE: Benita Murrieta  [address] [facility] [address]     Dear Colleague,    Thank you for referring your patient, Benita Murrieta, to the [facility] at [facility]. Please see a copy of my visit note below.    [facility]  New Obstetrics Visit    HPI: 39 year old  at 13w5d by LMP c/w 8+6wk US here today for initial OB visit. No leaking fluid, change in discharge, vaginal bleeding, or cramping.    Preg c/b:  - AIS s/p conization , pap/ECC neg x1 then annual pap/HPV neg since (missed ), last 2017  - Homozygous Factor V Leiden w/ h/o PE and UE DVT, on 90mg BID lovenox (warfarin pre-pregnancy)  - Obesity  - H/o macrosomia, 4.5kg 2nd child  - H/o CSx 1 for suspected macrosomia  - H/o PPH  - Short IPI, del 2018    OBHx  OB History    Para Term  AB Living   3 2 2 0 0 2   SAB TAB Ectopic Multiple Live Births   0 0 0 0 2      # Outcome Date GA Lbr Jorge/2nd Weight Sex Delivery Anes PTL Lv   3 Current            2 Term 18 39w0d  4.593 kg (10 lb 2 oz) F CS-LTranv  N JAZ      Name: GREGOR MURRIETA      Apgar1: 9  Apgar5: 9   1 Term /04/16 39w6d / 02:57 3.98 kg (8 lb 12.4 oz) M Vag-Spont EPI  JAZ      Apgar1: 9  Apgar5: 9      Obstetric Comments   Induced d/t anticoagulant use. + PPH. Denies GDM, HTN, PPD.       GYN History  - LMP: Patient's last menstrual period was 2019.  - Pap Smears: H/o AIS 2012 s/p cone, needs annual paps w/ cotesting  Lab Results   Component Value Date    PAP NIL 2017    PAP NIL 2015    PAP NIL 2014     PMHx:   Past Medical History:   Diagnosis Date     Abnormal Pap smear of cervix     conization Dr. Fernandez.  f/u , paps normal      ASCUS on Pap smear 2012    + hpv,     BMI 32.0-32.9,adult      DVT of upper extremity (deep vein thrombosis) (H) 2011    right arm      Factor V deficiency (H) 2005    homozygous      Mass 2011    URETHRAl mass       PE (pulmonary embolism)     was on birth control at time, hosp x 4-5 d       PSHx:   Past Surgical History:   Procedure Laterality Date     APPENDECTOMY OPEN CHILD  1992      SECTION N/A 2018    Procedure:  SECTION;  Primary  Section;  Surgeon: Junie Hightower MD;  Location: [facility] L[facility]D     CONIZATION  2012    Procedure: CONIZATION;  Cold Knife Cone   Choice anesthesia;  Surgeon: Doreen Fernandez MD;  Location: [facility] OR     CYSTOSCOPY, EXCISE URETHRAL CARUNCLE, COMBINED  2012    Procedure: COMBINED CYSTOSCOPY, EXCISE URETHRAL CARUNCLE;  Cystoscopy and Excision of Urethral Mass;  Surgeon: Rasheeda Salazar MD;  Location: [facility] OR     wisdom teeth       Meds: lovenox 90mg BID  Current Outpatient Medications   Medication     acetaminophen (TYLENOL) 325 MG tablet     Prenatal Vit-Fe Fumarate-FA (PRENATAL VITAMIN PO)     VITAMIN D, CHOLECALCIFEROL, PO     No current facility-administered medications for this visit.      Allergies   Allergen Reactions     Misoprostol      Tolerated multiple low doses miso induction for labor. However, one hour after delivery when she received 800 mcg miso, her skin turned blue in color and face pale and had tachycardia. No throat closure or difficulty breathing or chest pain. Quickly resolved with IV Benadryl. Patient satted just fine. Could receive again in the future; however, monitor for this side effect.     ROS: 10-Point ROS negative except as noted in HPI    Preventative Health  Diet: varied  Exercise: minimal currently, working on    Physical Exam  LMP 2019   Gen: Well-appearing, NAD  HEENT: Normocephalic, atraumatic  CV:  RRR  Pulm: Nonlabored breathing  Abd: Soft, non-tender, non-distended  Ext: No LE edema, extremities warm and well perfused    Breast: Symmetric, no nipple discharge or retraction, no axillary lymphadenopathy, no palpable nodules or masses bilaterally    Pelvic:  Normal appearing external female genitalia.  Normal hair distribution. Vagina is without lesions. Physiologic discharge. Cervix multiparous, no lesions, no cervical motion tenderness. Uterus is approx 12wk size, non-tender. No adnexal tenderness or masses    Labs:  GCT, GC/CT, pap, HPV, Hep Xa to be done today  NOB labs (serum, UCx) drawn yesterday and pending    FHT: 160    Assessment/Plan:  Ms. Benita Olivo is a 39 year old  at 13w5d by LMP c/w 8+6wk US, here for new OB visit. Pregnancy is complicated by AMA, Factor V Leiden, h/o UE clot & PE, h/o macrosomia and CS, and h/o AIS.    #H/o macrosomia w/ 4.5kg 2nd child, scheduled PLTCS for EFW>5kg  #Obesity, BMI 32  - A1C 5.5, GCT ordered, she will go to lab this week to do  - Discussed role of early DM screening, option of TOLAC vs CS - recommendations/likelihood of success depend on course of pregnancy, dx of DM, etc. Risk of TOLAC including uterine dehiscence, fetal distress/death, and urgent/stat CS with increased risk of sugical complications vs high risk of repeat CS in general briefly discussed. She is leaning towards TOLAC, but wants to have conversation closer to due date when we have more info on how this pregnancy is going    #Homozygous Factor V Leiden  #H/o PE on OCPs ()  #H/o upper extremity DVT ()  - warfarin outside of pregnancy, on lovenox 90mg BID. Following HepXa lvls qtrimester, 1.31 (19, dose was reduced from 100mg to 90mg BID), repeat Xa lvl with GCT lab draw, will fwd results to TANGELA Torres & Isacc Garcia w/ hematology for dose mgmt recs  - follow with hematology, last seen 19. Plan visit at 32wks for delivery planning (scheduled 19), and again postpartum    #H/o AIS: S/p cone , neg pap since. Missed .  When initialy diagnosed discussed hysterectomy after childbearing completed. Did not discuss continued fertility preservation goals today  - pap w/ HPV collected today  - needs cervical lengths serially to start at 16wks, message sent to [facility]  as currently first US scheduled at anatomy scan    #PNC:  - Rh neg, plan rhogam at 28wks. Hgb 12.8  - NOB labs: Rh neg, eleazar neg. Remainder drawn yesterday and pending  - AMA: Declined genetic counseling/screening. LVl 2 US scheduled for 8/20 w/ MFM    Follow up in 4 weeks.    Staffed with Dr. Mitra Olmstead MD  OBGYN PGY-4  07/19/19     The Patient was seen in [facility] by RHETT OLMSTEAD.  I reviewed the history & exam. Assessment and plan were jointly made.    Maria Luisa Manriquez MD

## 2019-07-19 NOTE — NURSING NOTE
Chief Complaint   Patient presents with     Prenatal Care     JEROME 13 weeks and 5 days   Michelle Brennan LPN

## 2019-07-19 NOTE — PROGRESS NOTES
[facility]  New Obstetrics Visit    HPI: 39 year old  at 13w5d by LMP c/w 8+6wk US here today for initial OB visit. No leaking fluid, change in discharge, vaginal bleeding, or cramping.    Preg c/b:  - AIS s/p conization , pap/ECC neg x1 then annual pap/HPV neg since (missed ), last 2017  - Homozygous Factor V Leiden w/ h/o PE and UE DVT, on 90mg BID lovenox (warfarin pre-pregnancy)  - Obesity  - H/o macrosomia, 4.5kg 2nd child  - H/o CSx 1 for suspected macrosomia  - H/o PPH  - Short IPI, del 2018    OBHx  OB History    Para Term  AB Living   3 2 2 0 0 2   SAB TAB Ectopic Multiple Live Births   0 0 0 0 2      # Outcome Date GA Lbr Jorge/2nd Weight Sex Delivery Anes PTL Lv   3 Current            2 Term 18 39w0d  4.593 kg (10 lb 2 oz) F CS-LTranv  N JAZ      Name: [email or web address] UMAIR      Apgar1: 9  Apgar5: 9   1 Term /16 39w6d / 02:57 3.98 kg (8 lb 12.4 oz) M Vag-Spont EPI  JAZ      Apgar1: 9  Apgar5: 9      Obstetric Comments   Induced d/t anticoagulant use. + PPH. Denies GDM, HTN, PPD.       GYN History  - LMP: Patient's last menstrual period was 2019.  - Pap Smears: H/o AIS  s/p cone, needs annual paps w/ cotesting  Lab Results   Component Value Date    PAP NIL 2017    PAP NIL 2015    PAP NIL 2014     PMHx:   Past Medical History:   Diagnosis Date     Abnormal Pap smear of cervix     conization Dr. Fernandez.  f/u , paps normal      ASCUS on Pap smear 2012    + hpv,     BMI 32.0-32.9,adult      DVT of upper extremity (deep vein thrombosis) (H)     right arm      Factor V deficiency (H)     homozygous      Mass     URETHRAl mass      PE (pulmonary embolism)     was on birth control at time, hosp x 4-5 d       PSHx:   Past Surgical History:   Procedure Laterality Date     APPENDECTOMY OPEN CHILD        SECTION N/A 2018    Procedure:  SECTION;  Primary  Section;  Surgeon: Katja  Junie Mora MD;  [facility] L[facility]D     CONIZATION  8/8/2012    Procedure: CONIZATION;  Cold Knife Cone   Choice anesthesia;  Surgeon: Doreen Fernandez MD;  [facility] OR     CYSTOSCOPY, EXCISE URETHRAL CARUNCLE, COMBINED  7/24/2012    Procedure: COMBINED CYSTOSCOPY, EXCISE URETHRAL CARUNCLE;  Cystoscopy and Excision of Urethral Mass;  Surgeon: Rasheeda Salazar MD;  [facility]: [facility] OR     wisdom teeth       Meds: lovenox 90mg BID  Current Outpatient Medications   Medication     acetaminophen (TYLENOL) 325 MG tablet     Prenatal Vit-Fe Fumarate-FA (PRENATAL VITAMIN PO)     VITAMIN D, CHOLECALCIFEROL, PO     No current facility-administered medications for this visit.      Allergies   Allergen Reactions     Misoprostol      Tolerated multiple low doses miso induction for labor. However, one hour after delivery when she received 800 mcg miso, her skin turned blue in color and face pale and had tachycardia. No throat closure or difficulty breathing or chest pain. Quickly resolved with IV Benadryl. Patient satted just fine. Could receive again in the future; however, monitor for this side effect.     ROS: 10-Point ROS negative except as noted in HPI    Preventative Health  Diet: varied  Exercise: minimal currently, working on    Physical Exam  LMP 04/14/2019   Gen: Well-appearing, NAD  HEENT: Normocephalic, atraumatic  CV:  RRR  Pulm: Nonlabored breathing  Abd: Soft, non-tender, non-distended  Ext: No LE edema, extremities warm and well perfused    Breast: Symmetric, no nipple discharge or retraction, no axillary lymphadenopathy, no palpable nodules or masses bilaterally    Pelvic:  Normal appearing external female genitalia. Normal hair distribution. Vagina is without lesions. Physiologic discharge. Cervix multiparous, no lesions, no cervical motion tenderness. Uterus is approx 12wk size, non-tender. No adnexal tenderness or masses    Labs:  GCT, GC/CT, pap, HPV, Hep Xa to be done today  NOB labs (serum, UCx)  drawn yesterday and pending    FHT: 160    Assessment/Plan:  Ms. Benita Olivo is a 39 year old  at 13w5d by LMP c/w 8+6wk US, here for new OB visit. Pregnancy is complicated by AMA, Factor V Leiden, h/o UE clot & PE, h/o macrosomia and CS, and h/o AIS.    #H/o macrosomia w/ 4.5kg 2nd child, scheduled PLTCS for EFW>5kg  #Obesity, BMI 32  - A1C 5.5, GCT ordered, she will go to lab this week to do  - Discussed role of early DM screening, option of TOLAC vs CS - recommendations/likelihood of success depend on course of pregnancy, dx of DM, etc. Risk of TOLAC including uterine dehiscence, fetal distress/death, and urgent/stat CS with increased risk of sugical complications vs high risk of repeat CS in general briefly discussed. She is leaning towards TOLAC, but wants to have conversation closer to due date when we have more info on how this pregnancy is going    #Homozygous Factor V Leiden  #H/o PE on OCPs ()  #H/o upper extremity DVT ()  - warfarin outside of pregnancy, on lovenox 90mg BID. Following HepXa lvls qtrimester, 1.31 (19, dose was reduced from 100mg to 90mg BID), repeat Xa lvl with GCT lab draw, will fwd results to TANGELA Torres & Isacc Garcia w/ hematology for dose mgmt recs  - follow with hematology, last seen 19. Plan visit at 32wks for delivery planning (scheduled 19), and again postpartum    #H/o AIS: S/p cone , neg pap since. Missed .  When initialy diagnosed discussed hysterectomy after childbearing completed. Did not discuss continued fertility preservation goals today  - pap w/ HPV collected today  - needs cervical lengths serially to start at 16wks, message sent to [facility] as currently first US scheduled at anatomy scan    #PNC:  - Rh neg, plan rhogam at 28wks. Hgb 12.8  - NOB labs: Rh neg, eleazar neg. Remainder drawn yesterday and pending  - AMA: Declined genetic counseling/screening. LVl 2 US scheduled for 8/20 w/ MFM    Follow up in 4 weeks.    Staffed with  Dr. Mitra Olmstead MD  OBGYN PGY-4  07/19/19     The Patient was seen in [facility] by RHETT OLMSTEAD.  I reviewed the history & exam. Assessment and plan were jointly made.    Maria Luisa Manriquez MD

## 2019-07-20 ASSESSMENT — ANXIETY QUESTIONNAIRES: GAD7 TOTAL SCORE: 0

## 2019-07-21 PROBLEM — E55.9 VITAMIN D DEFICIENCY: Status: ACTIVE | Noted: 2019-07-21

## 2019-07-24 LAB
COPATH REPORT: NORMAL
PAP: NORMAL

## 2019-07-25 LAB
FINAL DIAGNOSIS: NORMAL
HPV HR 12 DNA CVX QL NAA+PROBE: NEGATIVE
HPV16 DNA SPEC QL NAA+PROBE: NEGATIVE
HPV18 DNA SPEC QL NAA+PROBE: NEGATIVE
SPECIMEN DESCRIPTION: NORMAL
SPECIMEN SOURCE CVX/VAG CYTO: NORMAL

## 2019-08-16 ENCOUNTER — PRE VISIT (OUTPATIENT)
Dept: MATERNAL FETAL MEDICINE | Facility: CLINIC | Age: 39
End: 2019-08-16

## 2019-08-20 ENCOUNTER — OFFICE VISIT (OUTPATIENT)
Dept: MATERNAL FETAL MEDICINE | Facility: CLINIC | Age: 39
End: 2019-08-20
Attending: ADVANCED PRACTICE MIDWIFE
Payer: COMMERCIAL

## 2019-08-20 ENCOUNTER — HOSPITAL ENCOUNTER (OUTPATIENT)
Dept: ULTRASOUND IMAGING | Facility: CLINIC | Age: 39
Discharge: HOME | End: 2019-08-20
Attending: ADVANCED PRACTICE MIDWIFE | Admitting: SOCIAL WORKER
Payer: COMMERCIAL

## 2019-08-20 DIAGNOSIS — O99.212 OBESITY AFFECTING PREGNANCY IN SECOND TRIMESTER: ICD-10-CM

## 2019-08-20 DIAGNOSIS — O99.112 THROMBOPHILIA AFFECTING PREGNANCY IN SECOND TRIMESTER, ANTEPARTUM (H): ICD-10-CM

## 2019-08-20 DIAGNOSIS — O09.529 AMA (ADVANCED MATERNAL AGE) MULTIGRAVIDA 35+, UNSPECIFIED TRIMESTER: Primary | ICD-10-CM

## 2019-08-20 DIAGNOSIS — O44.42 LOW LYING PLACENTA NOS OR WITHOUT HEMORRHAGE, SECOND TRIMESTER: ICD-10-CM

## 2019-08-20 DIAGNOSIS — O26.90 PREGNANCY RELATED CONDITION, ANTEPARTUM: ICD-10-CM

## 2019-08-20 DIAGNOSIS — D68.59 THROMBOPHILIA AFFECTING PREGNANCY IN SECOND TRIMESTER, ANTEPARTUM (H): ICD-10-CM

## 2019-08-20 DIAGNOSIS — O35.9XX0 SUSPECTED FETAL ANOMALY, ANTEPARTUM, SINGLE OR UNSPECIFIED FETUS: ICD-10-CM

## 2019-08-20 DIAGNOSIS — O43.192 MARGINAL INSERTION OF UMBILICAL CORD AFFECTING MANAGEMENT OF MOTHER IN SECOND TRIMESTER: ICD-10-CM

## 2019-08-20 PROBLEM — O44.40 LOW-LYING PLACENTA: Status: ACTIVE | Noted: 2019-08-20

## 2019-08-20 PROCEDURE — 76817 TRANSVAGINAL US OBSTETRIC: CPT | Performed by: OBSTETRICS & GYNECOLOGY

## 2019-08-20 PROCEDURE — 76811 OB US DETAILED SNGL FETUS: CPT

## 2019-08-20 NOTE — PROGRESS NOTES
Please see ultrasound report under imaging tab for details on ultrasound performed today.    Sanam Roman MD  , OB/GYN  Maternal-Fetal Medicine  [email or web address]  [phone] (Academic office)  [phone] (Pager)

## 2019-08-23 ENCOUNTER — OFFICE VISIT (OUTPATIENT)
Dept: OBGYN | Facility: CLINIC | Age: 39
End: 2019-08-23
Attending: OBSTETRICS & GYNECOLOGY
Payer: COMMERCIAL

## 2019-08-23 VITALS
BODY MASS INDEX: 31.38 KG/M2 | WEIGHT: 225 LBS | SYSTOLIC BLOOD PRESSURE: 116 MMHG | HEART RATE: 89 BPM | DIASTOLIC BLOOD PRESSURE: 72 MMHG

## 2019-08-23 DIAGNOSIS — O09.92 HIGH-RISK PREGNANCY IN SECOND TRIMESTER: ICD-10-CM

## 2019-08-23 DIAGNOSIS — O26.892 HEARTBURN DURING PREGNANCY IN SECOND TRIMESTER: Primary | ICD-10-CM

## 2019-08-23 DIAGNOSIS — R12 HEARTBURN DURING PREGNANCY IN SECOND TRIMESTER: Primary | ICD-10-CM

## 2019-08-23 ASSESSMENT — PAIN SCALES - GENERAL: PAINLEVEL: NO PAIN (0)

## 2019-08-23 NOTE — LETTER
2019       RE: Benita Olivo  [address] [place] [id number]     Dear Colleague,    Thank you for referring your patient, Benita Olivo, to the [facility] at [facility]. Please see a copy of my visit note below.    HPI: 39 year old  at 18w5d by LMP c/w 8+6wk US here today for return OB visit. No leaking fluid, change in discharge, vaginal bleeding, or cramping. Had follow-up ultrasound with Dr. Roman and no further cervical length ultrasound were recommended, but serial growth ultrasounds were given history of macrosomia.  She has not yet gone to the lab for Heparin Xa level and early GCT.  She is not at the correct time after the lovenox dose to check the level today.    Preg c/b:   - AIS s/p conization , pap/ECC neg x1 then annual pap/HPV neg since (missed ), last 2017   - Homozygous Factor V Leiden w/ h/o PE and UE DVT, on 90mg BID lovenox (warfarin pre-pregnancy)   - Obesity   - H/o macrosomia, 4.5kg 2nd child   - H/o CSx 1 for suspected macrosomia   - H/o PPH   - Short IPI, del 2018     Meds: lovenox 90mg BID   Current Outpatient Medications   Medication     acetaminophen (TYLENOL) 325 MG tablet     Prenatal Vit-Fe Fumarate-FA (PRENATAL VITAMIN PO)     VITAMIN D, CHOLECALCIFEROL, PO     Physical Exam   /72   Pulse 89   Wt 102.1 kg (225 lb)   LMP 2019   Breastfeeding? No   BMI 31.38 kg/m      Gen: Well-appearing, NAD   See flow sheet    Assessment/Plan:   Ms. Benita Olivo is a 39 year old  at 18w5d by LMP c/w 8+6wk US, here for return OB visit. Pregnancy is complicated by AMA, Factor V Leiden, h/o UE clot & PE, h/o macrosomia and CS, and h/o AIS.     #H/o macrosomia w/ 4.5kg 2nd child, continue to monitor weight gain and screen for DM.  Patient hoping to avoid needing a  with this delivery.     #Obesity, BMI 32   - A1C 5.5, GCT ordered, she still needs to complete this,  reinforced that control of DM if present will decrease risk of  and improve overall pregnancy outcomes.   - Discussed TOLAC vs CS - recommendations/likelihood of success depend on course of pregnancy, dx of DM, etc. Risk of TOLAC including uterine dehiscence, fetal distress/death, and urgent/stat CS with increased risk of sugical complications vs high risk of repeat CS in general briefly discussed. She is leaning towards TOLAC, but wants to have conversation closer to due date when we have more info on how this pregnancy is going     #Homozygous Factor V Leiden   #H/o PE on OCPs ()   #H/o upper extremity DVT ()   - warfarin outside of pregnancy, on lovenox 90mg BID. Following HepXa lvls qtrimester, 1.31 (19, dose was reduced from 100mg to 90mg BID), repeat Xa lvl with GCT lab draw.  Reinforced importance of this and will fwd results to TANGELA Torres & Dr. Isacc Garcia w/ hematology for dose mgmt recs   - follow with hematology, last seen 19. Plan visit at 32wks for delivery planning (scheduled 19), and again postpartum     #H/o AIS: S/p cone , neg pap since. Missed .   When initialy diagnosed discussed hysterectomy after childbearing completed. Did not discuss continued fertility preservation goals today   - pap w/ HPV collected today   - cervical length is normal and history of 2 term deliveries after cone.  No further cervical length u /s necessary.    #PNC:   - Rh neg, plan rhogam at 28wks. Hgb 12.8   - NOB labs: Rh neg, eleazar neg. Remainder drawn yesterday and pending  - AMA: Declined genetic counseling/screening. LVl 2 US normal except for some views not completely visualized and follow-up ultrasound is scheduled.  Placenta was low lying with marginal cord insertion.     Follow up in 4 weeks.    Maria Luisa Manriquez MD

## 2019-08-23 NOTE — PROGRESS NOTES
HPI: 39 year old  at 18w5d by LMP c/w 8+6wk US here today for return OB visit. No leaking fluid, change in discharge, vaginal bleeding, or cramping. Had follow-up ultrasound with Dr. Roman and no further cervical length ultrasound were recommended, but serial growth ultrasounds were given history of macrosomia.  She has not yet gone to the lab for Heparin Xa level and early GCT.  She is not at the correct time after the lovenox dose to check the level today.    Preg c/b:   - AIS s/p conization , pap/ECC neg x1 then annual pap/HPV neg since (missed ), last 2017   - Homozygous Factor V Leiden w/ h/o PE and UE DVT, on 90mg BID lovenox (warfarin pre-pregnancy)   - Obesity   - H/o macrosomia, 4.5kg 2nd child   - H/o CSx 1 for suspected macrosomia   - H/o PPH   - Short IPI, del 2018     Meds: lovenox 90mg BID   Current Outpatient Medications   Medication     acetaminophen (TYLENOL) 325 MG tablet     Prenatal Vit-Fe Fumarate-FA (PRENATAL VITAMIN PO)     VITAMIN D, CHOLECALCIFEROL, PO         Physical Exam   /72   Pulse 89   Wt 102.1 kg (225 lb)   LMP 2019   Breastfeeding? No   BMI 31.38 kg/m     Gen: Well-appearing, NAD   See flow sheet    Assessment/Plan:   Ms. Benita Olivo is a 39 year old  at 18w5d by LMP c/w 8+6wk US, here for return OB visit. Pregnancy is complicated by AMA, Factor V Leiden, h/o UE clot & PE, h/o macrosomia and CS, and h/o AIS.     #H/o macrosomia w/ 4.5kg 2nd child, continue to monitor weight gain and screen for DM.  Patient hoping to avoid needing a  with this delivery.     #Obesity, BMI 32   - A1C 5.5, GCT ordered, she still needs to complete this, reinforced that control of DM if present will decrease risk of  and improve overall pregnancy outcomes.   - Discussed TOLAC vs CS - recommendations/likelihood of success depend on course of pregnancy, dx of DM, etc. Risk of TOLAC including uterine dehiscence, fetal distress/death, and  urgent/stat CS with increased risk of sugical complications vs high risk of repeat CS in general briefly discussed. She is leaning towards TOLAC, but wants to have conversation closer to due date when we have more info on how this pregnancy is going     #Homozygous Factor V Leiden   #H/o PE on OCPs (2005)   #H/o upper extremity DVT (2011)   - warfarin outside of pregnancy, on lovenox 90mg BID. Following HepXa lvls qtrimester, 1.31 (5/24/19, dose was reduced from 100mg to 90mg BID), repeat Xa lvl with GCT lab draw.  Reinforced importance of this and will fwd results to TANGELA Torres & Dr. Isacc Garcia w/ hematology for dose mgmt recs   - follow with hematology, last seen 5/24/19. Plan visit at 32wks for delivery planning (scheduled 11/18/19), and again postpartum     #H/o AIS: S/p cone 2012, neg pap since. Missed 2016.   When initialy diagnosed discussed hysterectomy after childbearing completed. Did not discuss continued fertility preservation goals today   - pap w/ HPV collected today   - cervical length is normal and history of 2 term deliveries after cone.  No further cervical length u /s necessary.    #PNC:   - Rh neg, plan rhogam at 28wks. Hgb 12.8   - NOB labs: Rh neg, eleazar neg. Remainder drawn yesterday and pending  - AMA: Declined genetic counseling/screening. LVl 2 US normal except for some views not completely visualized and follow-up ultrasound is scheduled.  Placenta was low lying with marginal cord insertion.     Follow up in 4 weeks.  Maria Luisa Manriquez MD

## 2019-09-14 DIAGNOSIS — O09.299 H/O MACROSOMIA IN INFANT IN PRIOR PREGNANCY, CURRENTLY PREGNANT: ICD-10-CM

## 2019-09-14 LAB — GLUCOSE 1H P 50 G GLC PO SERPL-MCNC: 111 MG/DL (ref 60–129)

## 2019-09-14 PROCEDURE — 82950 GLUCOSE TEST: CPT | Performed by: OBSTETRICS & GYNECOLOGY

## 2019-09-14 PROCEDURE — 36415 COLL VENOUS BLD VENIPUNCTURE: CPT | Performed by: OBSTETRICS & GYNECOLOGY

## 2019-09-18 ENCOUNTER — OFFICE VISIT (OUTPATIENT)
Dept: MATERNAL FETAL MEDICINE | Facility: CLINIC | Age: 39
End: 2019-09-18
Attending: OBSTETRICS & GYNECOLOGY
Payer: COMMERCIAL

## 2019-09-18 ENCOUNTER — HOSPITAL ENCOUNTER (OUTPATIENT)
Dept: ULTRASOUND IMAGING | Facility: CLINIC | Age: 39
Discharge: HOME | End: 2019-09-18
Attending: OBSTETRICS & GYNECOLOGY | Admitting: OBSTETRICS & GYNECOLOGY
Payer: COMMERCIAL

## 2019-09-18 DIAGNOSIS — O34.42 HISTORY OF CERVICAL LEEP BIOPSY AFFECTING CARE OF MOTHER, ANTEPARTUM, SECOND TRIMESTER: ICD-10-CM

## 2019-09-18 DIAGNOSIS — O43.192 MARGINAL INSERTION OF UMBILICAL CORD AFFECTING MANAGEMENT OF MOTHER IN SECOND TRIMESTER: Primary | ICD-10-CM

## 2019-09-18 DIAGNOSIS — Z36.86 ENCOUNTER FOR ANTENATAL SCREENING FOR CERVICAL LENGTH: ICD-10-CM

## 2019-09-18 DIAGNOSIS — O35.9XX0 SUSPECTED FETAL ANOMALY, ANTEPARTUM, SINGLE OR UNSPECIFIED FETUS: ICD-10-CM

## 2019-09-18 PROCEDURE — 76817 TRANSVAGINAL US OBSTETRIC: CPT | Performed by: OBSTETRICS & GYNECOLOGY

## 2019-09-18 PROCEDURE — 76816 OB US FOLLOW-UP PER FETUS: CPT

## 2019-09-18 NOTE — PROGRESS NOTES
Please refer to ultrasound report under 'Imaging' Studies of 'Chart Review' tabs.    Andre Rivera M.D.

## 2019-09-26 PROBLEM — O43.199 MARGINAL INSERTION OF UMBILICAL CORD AFFECTING MANAGEMENT OF MOTHER: Status: ACTIVE | Noted: 2019-09-26

## 2019-09-30 ENCOUNTER — OFFICE VISIT (OUTPATIENT)
Dept: OBGYN | Facility: CLINIC | Age: 39
End: 2019-09-30
Attending: OBSTETRICS & GYNECOLOGY
Payer: COMMERCIAL

## 2019-09-30 VITALS
BODY MASS INDEX: 32.55 KG/M2 | WEIGHT: 232.5 LBS | HEART RATE: 87 BPM | HEIGHT: 71 IN | DIASTOLIC BLOOD PRESSURE: 69 MMHG | SYSTOLIC BLOOD PRESSURE: 103 MMHG

## 2019-09-30 DIAGNOSIS — O09.92 HIGH-RISK PREGNANCY IN SECOND TRIMESTER: Primary | ICD-10-CM

## 2019-09-30 PROCEDURE — G0463 HOSPITAL OUTPT CLINIC VISIT: HCPCS | Mod: ZF

## 2019-09-30 ASSESSMENT — MIFFLIN-ST. JEOR: SCORE: 1825.74

## 2019-09-30 NOTE — LETTER
9/30/2019       RE: Benita Olivo  [address] [facility] [address]     Dear Colleague,    Thank you for referring your patient, Benita Olivo, to the [facility]. Please see a copy of my visit note below.    No notes on file    Again, thank you for allowing me to participate in the care of your patient.      Sincerely,    Monica Rothman MD

## 2019-09-30 NOTE — LETTER
9/30/2019      RE: Benita Olivo  [address] [facility] [address]       No notes on file    Monica Rothman MD

## 2019-09-30 NOTE — LETTER
"2019    RE: Benita Olivo  [address] [facility] [address]     Dear Colleague,    Thank you for referring your patient, Benita Olivo, to the [facility] at [facility]. Please see a copy of my visit note below.    Doing well. On lovenox and follow with heme for Xa levels.  Had uls f/u.  Marginal cord insert continues, placenta no longer Low lying. Passed GTT.     /69 (BP Location: Left arm, Patient Position: Chair)   Pulse 87   Ht 1.803 m (5' 11\")   Wt 105.5 kg (232 lb 8 oz)   LMP 2019   Breastfeeding? No   BMI 32.43 kg/m     See flow    A/p: 39 year old  at 24+wks.  Pregnancy is complicated by AMA, Factor V Leiden, h/o UE clot & PE, h/o macrosomia and CS, and h/o AIS.      #H/o macrosomia - repeat GCT at 28 wks    #Obesity, BMI 32   - A1C 5.5, GCT normal     #Homozygous Factor V Leiden   #H/o PE on OCPs ()   #H/o upper extremity DVT ()   - warfarin outside of pregnancy, on lovenox 90mg, visit at 32wks for delivery planning     #PNC:   - Rh neg, plan rhogam at 28wks. Hgb 12.8   - follow-up 4 wks    Monica Rothman MD, [facility] Specialists Staff  OB/GYN  "

## 2019-09-30 NOTE — LETTER
"2019      RE: Benita Olivo  [address] [facility] [address]       Doing well. On lovenox and follow with heme for Xa levels.  Had uls f/u.  Marginal cord insert continues, placenta no longer Low lying. Passed GTT.     /69 (BP Location: Left arm, Patient Position: Chair)   Pulse 87   Ht 1.803 m (5' 11\")   Wt 105.5 kg (232 lb 8 oz)   LMP 2019   Breastfeeding? No   BMI 32.43 kg/m     See flow    A/p: 39 year old  at 24+wks.  Pregnancy is complicated by AMA, Factor V Leiden, h/o UE clot & PE, h/o macrosomia and CS, and h/o AIS.      #H/o macrosomia - repeat GCT at 28 wks    #Obesity, BMI 32   - A1C 5.5, GCT normal     #Homozygous Factor V Leiden   #H/o PE on OCPs ()   #H/o upper extremity DVT ()   - warfarin outside of pregnancy, on lovenox 90mg, visit at 32wks for delivery planning     #PNC:   - Rh neg, plan rhogam at 28wks. Hgb 12.8   - follow-up 4 wks    Monica Rothman MD, [facility] Specialists Staff  OB/GYN               Monica Rothman MD      "

## 2019-09-30 NOTE — Clinical Note
9/30/2019       RE: Benita Olivo  [address] [facility] [address]     Dear Colleague,    Thank you for referring your patient, Benita Olivo, to the [facility] at [facility]. Please see a copy of my visit note below.    No notes on file    Again, thank you for allowing me to participate in the care of your patient.      Sincerely,    Monica Rothman MD

## 2019-09-30 NOTE — NURSING NOTE
Chief Complaint   Patient presents with     Prenatal Care     24w1d       See ALEX Flowers 9/30/2019

## 2019-10-22 NOTE — PROGRESS NOTES
"Doing well. On lovenox and follow with heme for Xa levels.  Had uls f/u.  Marginal cord insert continues, placenta no longer Low lying. Passed GTT.     /69 (BP Location: Left arm, Patient Position: Chair)   Pulse 87   Ht 1.803 m (5' 11\")   Wt 105.5 kg (232 lb 8 oz)   LMP 2019   Breastfeeding? No   BMI 32.43 kg/m    See flow    A/p: 39 year old  at 24+wks.  Pregnancy is complicated by AMA, Factor V Leiden, h/o UE clot & PE, h/o macrosomia and CS, and h/o AIS.      #H/o macrosomia - repeat GCT at 28 wks    #Obesity, BMI 32   - A1C 5.5, GCT normal     #Homozygous Factor V Leiden   #H/o PE on OCPs ()   #H/o upper extremity DVT ()   - warfarin outside of pregnancy, on lovenox 90mg, visit at 32wks for delivery planning     #PNC:   - Rh neg, plan rhogam at 28wks. Hgb 12.8   - follow-up 4 wks    Monica Rothman MD, [facility] Specialists Staff  OB/GYN             "

## 2019-10-28 NOTE — PROGRESS NOTES
39 year old, , 28w1d, denies LOF, vaginal bleeding, decrease in fetal movement.   The patient presents with the following concerns: No concerns     PHQ-9 SCORE 2016 7/15/2016 2019   PHQ-9 Total Score - - -   PHQ-9 Total Score 0 0 2     Education completed today includes breast feeding, [facility] hand out , contraception, counting movements, signs of pre-term labor, when to present to birthplace, post partum depression, GBS, getting enough iron, labor induction and planning repeat c/section.  Birth preferences reviewed: plans on repeat c/section  Labor support:     [place] Feeding plans :  - difficulties with first but did breast feed x7 months.    Contraception planned:  condoms and natural family planning  The following labs were ordered today:       GCT, CBC w platelets, Vitamin D and Anti-treponema and Heparin 10A     Blood type:   ABO   Date Value Ref Range Status   2019 O  Final     RH(D)   Date Value Ref Range Status   2019 Neg  Final     Antibody Screen   Date Value Ref Range Status   2019 Neg  Final     Rhogam  was given.    TDAP  wasgiven.    A/P:  Encounter Diagnosis   Name Primary?     Supervision of high-risk pregnancy of elderly multigravida Yes     No orders of the defined types were placed in this encounter.    No orders of the defined types were placed in this encounter.    Plans a repeat c/section will make appt to discuss with MDs at 32 wks.    US with MFM on 19 for repeat growth  Appt with jose on 19    Continue scheduled prenatal care    Ila Lee CNM

## 2019-10-29 ENCOUNTER — OFFICE VISIT (OUTPATIENT)
Dept: OBGYN | Facility: CLINIC | Age: 39
End: 2019-10-29
Attending: ADVANCED PRACTICE MIDWIFE
Payer: COMMERCIAL

## 2019-10-29 VITALS
SYSTOLIC BLOOD PRESSURE: 102 MMHG | WEIGHT: 238 LBS | DIASTOLIC BLOOD PRESSURE: 68 MMHG | HEART RATE: 86 BPM | HEIGHT: 71 IN | BODY MASS INDEX: 33.32 KG/M2

## 2019-10-29 DIAGNOSIS — O09.529 SUPERVISION OF HIGH-RISK PREGNANCY OF ELDERLY MULTIGRAVIDA: Primary | ICD-10-CM

## 2019-10-29 DIAGNOSIS — Z79.01 LONG TERM CURRENT USE OF ANTICOAGULANT THERAPY: ICD-10-CM

## 2019-10-29 DIAGNOSIS — O09.299 H/O MACROSOMIA IN INFANT IN PRIOR PREGNANCY, CURRENTLY PREGNANT: ICD-10-CM

## 2019-10-29 DIAGNOSIS — O09.299 HISTORY OF MACROSOMIA IN INFANT IN PRIOR PREGNANCY, CURRENTLY PREGNANT: ICD-10-CM

## 2019-10-29 LAB
ERYTHROCYTE [DISTWIDTH] IN BLOOD BY AUTOMATED COUNT: 12.9 % (ref 10–15)
GLUCOSE 1H P 50 G GLC PO SERPL-MCNC: 118 MG/DL (ref 60–129)
HCT VFR BLD AUTO: 38.4 % (ref 35–47)
HGB BLD-MCNC: 12.5 G/DL (ref 11.7–15.7)
LMWH PPP CHRO-ACNC: 0.67 IU/ML
MCH RBC QN AUTO: 30.9 PG (ref 26.5–33)
MCHC RBC AUTO-ENTMCNC: 32.6 G/DL (ref 31.5–36.5)
MCV RBC AUTO: 95 FL (ref 78–100)
PLATELET # BLD AUTO: 168 10E9/L (ref 150–450)
RBC # BLD AUTO: 4.04 10E12/L (ref 3.8–5.2)
WBC # BLD AUTO: 12.1 10E9/L (ref 4–11)

## 2019-10-29 PROCEDURE — 36415 COLL VENOUS BLD VENIPUNCTURE: CPT | Performed by: ADVANCED PRACTICE MIDWIFE

## 2019-10-29 PROCEDURE — 90715 TDAP VACCINE 7 YRS/> IM: CPT | Mod: ZF

## 2019-10-29 PROCEDURE — 25000128 H RX IP 250 OP 636: Mod: ZF

## 2019-10-29 PROCEDURE — 82306 VITAMIN D 25 HYDROXY: CPT | Performed by: ADVANCED PRACTICE MIDWIFE

## 2019-10-29 PROCEDURE — 85027 COMPLETE CBC AUTOMATED: CPT | Performed by: ADVANCED PRACTICE MIDWIFE

## 2019-10-29 PROCEDURE — 86780 TREPONEMA PALLIDUM: CPT | Performed by: ADVANCED PRACTICE MIDWIFE

## 2019-10-29 PROCEDURE — 85520 HEPARIN ASSAY: CPT | Performed by: ADVANCED PRACTICE MIDWIFE

## 2019-10-29 PROCEDURE — 82950 GLUCOSE TEST: CPT | Performed by: ADVANCED PRACTICE MIDWIFE

## 2019-10-29 PROCEDURE — 90471 IMMUNIZATION ADMIN: CPT | Mod: ZF

## 2019-10-29 PROCEDURE — 90472 IMMUNIZATION ADMIN EACH ADD: CPT | Mod: ZF

## 2019-10-29 PROCEDURE — G0463 HOSPITAL OUTPT CLINIC VISIT: HCPCS | Mod: ZF

## 2019-10-29 ASSESSMENT — PATIENT HEALTH QUESTIONNAIRE - PHQ9
5. POOR APPETITE OR OVEREATING: NOT AT ALL
SUM OF ALL RESPONSES TO PHQ QUESTIONS 1-9: 0

## 2019-10-29 ASSESSMENT — ANXIETY QUESTIONNAIRES
7. FEELING AFRAID AS IF SOMETHING AWFUL MIGHT HAPPEN: NOT AT ALL
1. FEELING NERVOUS, ANXIOUS, OR ON EDGE: NOT AT ALL
3. WORRYING TOO MUCH ABOUT DIFFERENT THINGS: NOT AT ALL
2. NOT BEING ABLE TO STOP OR CONTROL WORRYING: NOT AT ALL
5. BEING SO RESTLESS THAT IT IS HARD TO SIT STILL: NOT AT ALL
GAD7 TOTAL SCORE: 0
6. BECOMING EASILY ANNOYED OR IRRITABLE: NOT AT ALL

## 2019-10-29 ASSESSMENT — MIFFLIN-ST. JEOR: SCORE: 1850.69

## 2019-10-29 ASSESSMENT — PAIN SCALES - GENERAL: PAINLEVEL: NO PAIN (0)

## 2019-10-29 NOTE — NURSING NOTE
Chief Complaint   Patient presents with     Prenatal Care     JEROME 28 weeks and 1 day   Michelle Brennan LPN

## 2019-10-29 NOTE — LETTER
10/29/2019       RE: Benita Olivo  [address] [facility] [address]     Dear Colleague,    Thank you for referring your patient, Benita Olivo, to the [facility] at [facility]. Please see a copy of my visit note below.     39 year old, , 28w1d, denies LOF, vaginal bleeding, decrease in fetal movement.   The patient presents with the following concerns: No concerns     PHQ-9 SCORE 2016 7/15/2016 2019   PHQ-9 Total Score - - -   PHQ-9 Total Score 0 0 2     Education completed today includes breast feeding, [facility] hand out , contraception, counting movements, signs of pre-term labor, when to present to birthplace, post partum depression, GBS, getting enough iron, labor induction and planning repeat c/section.  Birth preferences reviewed: plans on repeat c/section  Labor support:      Feeding plans :  - difficulties with first but did breast feed x7 months.    Contraception planned:  condoms and natural family planning  The following labs were ordered today:       GCT, CBC w platelets, Vitamin D and Anti-treponema and Heparin 10A     Blood type:   ABO   Date Value Ref Range Status   2019 O  Final     RH(D)   Date Value Ref Range Status   2019 Neg  Final     Antibody Screen   Date Value Ref Range Status   2019 Neg  Final     Rhogam  was given.    TDAP  wasgiven.    A/P:  Encounter Diagnosis   Name Primary?     Supervision of high-risk pregnancy of elderly multigravida Yes     No orders of the defined types were placed in this encounter.    No orders of the defined types were placed in this encounter.    Plans a repeat c/section will make appt to discuss with MDs at 32 wks.    US with MFM on 19 for repeat growth  Appt with heme on 19    Continue scheduled prenatal care    Ila Lee CNM

## 2019-10-30 LAB
DEPRECATED CALCIDIOL+CALCIFEROL SERPL-MC: 40 UG/L (ref 20–75)
T PALLIDUM AB SER QL: NONREACTIVE

## 2019-10-30 ASSESSMENT — ANXIETY QUESTIONNAIRES: GAD7 TOTAL SCORE: 0

## 2019-11-07 ENCOUNTER — HEALTH MAINTENANCE LETTER (OUTPATIENT)
Age: 39
End: 2019-11-07

## 2019-11-12 ENCOUNTER — TELEPHONE (OUTPATIENT)
Dept: HEMATOLOGY | Facility: CLINIC | Age: 39
End: 2019-11-12

## 2019-11-12 NOTE — TELEPHONE ENCOUNTER
Left VM for patient to call center back. Need to reschedule her appt w/ Dr. Garcia for Wed, 11/20.

## 2019-11-20 ENCOUNTER — OFFICE VISIT (OUTPATIENT)
Dept: HEMATOLOGY | Facility: CLINIC | Age: 39
End: 2019-11-20
Attending: INTERNAL MEDICINE
Payer: COMMERCIAL

## 2019-11-20 VITALS
TEMPERATURE: 97.9 F | DIASTOLIC BLOOD PRESSURE: 76 MMHG | HEIGHT: 71 IN | OXYGEN SATURATION: 96 % | HEART RATE: 97 BPM | BODY MASS INDEX: 34.3 KG/M2 | WEIGHT: 245 LBS | SYSTOLIC BLOOD PRESSURE: 118 MMHG | RESPIRATION RATE: 15 BRPM

## 2019-11-20 DIAGNOSIS — Z79.01 CURRENT USE OF LONG TERM ANTICOAGULATION: Primary | ICD-10-CM

## 2019-11-20 LAB — LMWH PPP CHRO-ACNC: 0.97 IU/ML

## 2019-11-20 PROCEDURE — G0463 HOSPITAL OUTPT CLINIC VISIT: HCPCS

## 2019-11-20 PROCEDURE — 85520 HEPARIN ASSAY: CPT | Performed by: INTERNAL MEDICINE

## 2019-11-20 PROCEDURE — 99215 OFFICE O/P EST HI 40 MIN: CPT | Performed by: INTERNAL MEDICINE

## 2019-11-20 ASSESSMENT — MIFFLIN-ST. JEOR: SCORE: 1882.44

## 2019-11-20 ASSESSMENT — PAIN SCALES - GENERAL: PAINLEVEL: NO PAIN (0)

## 2019-11-20 NOTE — PROGRESS NOTES
[facility]  [facility]  [facility], [place] [address]  Main: [phone], Fax: [phone]        Outpatient Clinic Visit  Date:  2019    Benita Olivo is a 39-year-old woman with history of recurrent venous thrombosis who is also homozygous for factor V Leiden.  She returns today to discuss anticoagulation management around the time of delivery.  She is currently 31 weeks pregnant with a due date of 2020.  She plans to deliver here at the [place].  This is her third child.    Her first pregnancy ended with vaginal delivery 3 years ago.  Her second delivery was by  section 1 year ago due to the size of the baby.  She feels that the current baby is also going to be on the bigger side and thus she will likely have a repeat  although this has not been formally decided at this time.  She would like to have an epidural if she has a vaginal delivery, and obviously spinal anesthesia for  if at all possible.     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  Her first clotting event occurred in 2005 when she presented with right upper extremity deep vein thrombosis with associated pulmonary emboli.  This was a relatively unprovoked clot.  She had been intermittently using oral contraceptives for about 2 years prior to that event, and those were subsequently stopped.  However, it was not entirely clear if the event was provoked only by estrogen.  Subsequent testing revealed that she is homozygous for factor V Leiden.  At that time we had recommended strong consideration of long-term anticoagulation, but she chose to go off anticoagulant therapy after about 12 months.  She returned in  with what was felt to be an unprovoked recurrent clot in the left upper extremity.  In addition, there was a strong family history of venous thromboembolism on her mother's  side of the family, including a fatal pulmonary embolism in her maternal grandmother.  Thus, we again recommended long-term anticoagulation, which she has remained on since that time without any difficulty.  She has done well on warfarin over the years, and both previous pregnancies were managed with enoxaparin.    Her current pregnancy is progressing without complication.  She is currently on enoxaparin 100 mg every 12 hours.  The dose was increased from 90 mg 3 weeks ago as her anti-Xa level was just barely in the therapeutic range at 0.67.  Repeat level from today is pending.     Detailed physical exam was not performed.  She looks well.      ASSESSMENT / PLAN:  1.  History of recurrent upper extremity deep vein thrombosis and pulmonary embolism.   2.  Homozygous for factor V Leiden.   3.  Strong family history of venous thromboembolism including a fatal pulmonary embolism in her maternal grandmother.   4.  Currently 31 weeks pregnant.     Overall, Benita is doing well and her pregnancy is progressing without complication.  Given her personal and family history, and the fact that she is homozygous for factor V Leiden, she needs to maintain stable therapeutic anticoagulation right up until the time of delivery.  If she has a repeat  the plan will be to give the last dose of enoxaparin 24 hours prior to that procedure.  If she has a vaginal delivery we would recommend a timed induction of labor so that she can also receive her last dose of enoxaparin 24 hours prior.  We discussed that 24 hours must elapse from the last enoxaparin dose before she can safely have spinal or epidural anesthesia.    Following delivery, therapeutic intensity anticoagulation should be restarted as soon as possible, at the discretion of the OB team.  If this cannot be done within 24 hours, we would like to be consulted for further discussion.  This can be done by simply resuming her previous enoxaparin dose although as she gets  further postpartum we may need to adjust the dose down a bit depending on her weight.  She will contact us after delivery to help coordinate that.    We discussed that both enoxaparin and warfarin are considered safe for breast-feeding.  She will let us know when she wishes to transition back to oral anticoagulation, which at this time will be with warfarin.  We very briefly discussed the direct oral anticoagulant drugs and we have discussed those with her in the past clinic visits as well.  At this time she prefers to remain on warfarin which is certainly reasonable.  As long she remains on long-term anticoagulation, we should continue to see her back annually to review that plan.    She has our contact information and was instructed to call with any new questions, concerns, bleeding symptoms, or changes to the delivery plan that we discussed today.    Total time 40 minutes, all in counseling and coordination of care.        Reed Garcia MD  Associate Professor of Medicine  Division of Hematology, Oncology, and Transplantation  Director, [facility]

## 2019-11-20 NOTE — PATIENT INSTRUCTIONS
1. Please let the clinic know your delivery plan.  2. Continue enoxaparin as prescribed.  3. Return to clinic annually and as needed.

## 2019-11-24 ENCOUNTER — TELEPHONE (OUTPATIENT)
Dept: HEMATOLOGY | Facility: CLINIC | Age: 39
End: 2019-11-24

## 2019-11-25 ENCOUNTER — HOSPITAL ENCOUNTER (OUTPATIENT)
Dept: ULTRASOUND IMAGING | Facility: CLINIC | Age: 39
Discharge: HOME | End: 2019-11-25
Attending: OBSTETRICS & GYNECOLOGY | Admitting: OBSTETRICS & GYNECOLOGY
Payer: COMMERCIAL

## 2019-11-25 ENCOUNTER — OFFICE VISIT (OUTPATIENT)
Dept: OBGYN | Facility: CLINIC | Age: 39
End: 2019-11-25
Attending: ADVANCED PRACTICE MIDWIFE
Payer: COMMERCIAL

## 2019-11-25 ENCOUNTER — OFFICE VISIT (OUTPATIENT)
Dept: MATERNAL FETAL MEDICINE | Facility: CLINIC | Age: 39
End: 2019-11-25
Attending: OBSTETRICS & GYNECOLOGY
Payer: COMMERCIAL

## 2019-11-25 VITALS
DIASTOLIC BLOOD PRESSURE: 71 MMHG | HEIGHT: 71 IN | SYSTOLIC BLOOD PRESSURE: 110 MMHG | BODY MASS INDEX: 33.67 KG/M2 | WEIGHT: 240.5 LBS | HEART RATE: 85 BPM

## 2019-11-25 DIAGNOSIS — O43.192 MARGINAL INSERTION OF UMBILICAL CORD AFFECTING MANAGEMENT OF MOTHER IN SECOND TRIMESTER: Primary | ICD-10-CM

## 2019-11-25 DIAGNOSIS — O43.192 MARGINAL INSERTION OF UMBILICAL CORD AFFECTING MANAGEMENT OF MOTHER IN SECOND TRIMESTER: ICD-10-CM

## 2019-11-25 DIAGNOSIS — O09.529 SUPERVISION OF HIGH-RISK PREGNANCY OF ELDERLY MULTIGRAVIDA: Primary | ICD-10-CM

## 2019-11-25 PROCEDURE — 76816 OB US FOLLOW-UP PER FETUS: CPT

## 2019-11-25 PROCEDURE — G0463 HOSPITAL OUTPT CLINIC VISIT: HCPCS | Mod: ZF

## 2019-11-25 ASSESSMENT — PAIN SCALES - GENERAL: PAINLEVEL: NO PAIN (0)

## 2019-11-25 ASSESSMENT — MIFFLIN-ST. JEOR: SCORE: 1862.03

## 2019-11-25 NOTE — NURSING NOTE
Mrs. Olivo is a 39 year old woman homozygous for Factor V Leiden with history of pulmonary embolism currently 31 weeks pregnant. After discussion with Dr. Garcia she will continue therapeutic enoxaparin (heparin Xa level . will be drawn today). Plan for anticoagulation in the peripartum period reviewed.  Enoxaparin and subcutaneous injections discussed.   AVS reviewed.  Mrs. Olivo has the contact information for the Center and will call with delivery plan update.

## 2019-11-25 NOTE — PROGRESS NOTES
"Please see \"Imaging\" tab under \"Chart Review\" for details of today's US at the [facility].    Jarek Peck MD  Maternal-Fetal Medicine      "

## 2019-11-25 NOTE — PROGRESS NOTES
"Subjective:      39 year old  at 32w1d presentst for a routine prenatal appointment.   Denies vaginal bleeding or leakage of fluid.  Denies contractions. + fetal movement.      No HA, visual changes, RUQ or epigastric pain.   Patient concerns:   - Had follow-up growth ultrasound at [facility] for marginal insertion, baby measuring AGA, pt has questions re: TOLAC vs. Repeat    - Also questions re: timing of delivery d/t Lovenox use  Reviewed EOB labs with patient.  Rhogam and Tdap given previously.    Objective:  Vitals:    19 1036   BP: 110/71   Pulse: 85   Weight: 109.1 kg (240 lb 8 oz)   Height: 1.803 m (5' 11\")   See ob flowsheet    Assessment/Plan:   Encounter Diagnosis   Name Primary?     Supervision of high-risk pregnancy of elderly multigravida Yes     - Reviewed importance of daily fetal kick count and why/how to contact provider.  - Reviewed why/how to contact provider if headache/visual changes/RUQ or epigastric pain, decreased fetal movement, vaginal bleeding, leakage of fluid or more than 4 contractions in an hour.  - Patient education/orders or handouts today: PTL signs/symptoms   - Reviewed timing of induction or delivery likely at ~39 weeks. Reviewed induction process with previous , Giles score, cook catheter/horton vs. Pitocin, etc.  TOLAC consent reviewed, patient would like to review at home. Plan to sign at next visit.   - Return to clinic in 2 weeks and prn if questions or concerns.     CHAKA Quevedo CNM  "

## 2019-11-25 NOTE — LETTER
"2019       RE: Benita Olivo  [address] [facility] [address]     Dear Colleague,    Thank you for referring your patient, Benita Olivo, to the [facility] at [facility]. Please see a copy of my visit note below.    Subjective:      39 year old  at 32w1d presentst for a routine prenatal appointment.   Denies vaginal bleeding or leakage of fluid.  Denies contractions. + fetal movement.      No HA, visual changes, RUQ or epigastric pain.   Patient concerns:   - Had follow-up growth ultrasound at [facility] for marginal insertion, baby measuring AGA, pt has questions re: TOLAC vs. Repeat    - Also questions re: timing of delivery d/t Lovenox use  Reviewed EOB labs with patient.  Rhogam and Tdap given previously.    Objective:  Vitals:    19 1036   BP: 110/71   Pulse: 85   Weight: 109.1 kg (240 lb 8 oz)   Height: 1.803 m (5' 11\")   See ob flowsheet    Assessment/Plan:   Encounter Diagnosis   Name Primary?     Supervision of high-risk pregnancy of elderly multigravida Yes     - Reviewed importance of daily fetal kick count and why/how to contact provider.  - Reviewed why/how to contact provider if headache/visual changes/RUQ or epigastric pain, decreased fetal movement, vaginal bleeding, leakage of fluid or more than 4 contractions in an hour.  - Patient education/orders or handouts today: PTL signs/symptoms   - Reviewed timing of induction or delivery likely at ~39 weeks. Reviewed induction process with previous , Giles score, cook catheter/horton vs. Pitocin, etc.  TOLAC consent reviewed, patient would like to review at home. Plan to sign at next visit.   - Return to clinic in 2 weeks and prn if questions or concerns.     CHAKA Quevedo CNM      "

## 2019-11-25 NOTE — TELEPHONE ENCOUNTER
I called Mrs. Olivo and let er know that her heparin level was in range and she should continue current dose of 100 mg enoxaparin subcutaneously every 12 hours.

## 2019-12-10 NOTE — PROGRESS NOTES
[facility]  Prenatal Visit    SUBJECTIVE  Patient feels well. Had some concerns about large fetal size, whether TOLAC was a reasonable plan and desired further explanation of the risks. Currently patient experiences no contractions, no vaginal bleeding, no leakage of fluid, normal fetal movement. Patient has had a fluctuating cold for 2 weeks with mild cough and rhinorrhea; no fevers. She reports no headache, vision changes, RUQ pain, chest pain, dyspnea, constipation, diarrhea, or dysuria.      OBJECTIVE  /66 (BP Location: Right arm, Patient Position: Chair)   Pulse 88   Wt 109.1 kg (240 lb 9.6 oz)   LMP 2019   BMI 33.56 kg/m       See OB Flowsheet    ASSESSMENT/PLAN  Benita Olivo is a 39 year old  who is 34w5d, pregnancy complicated by Factor V def (homozygous), Abn pap s/p conization, hx of PE and DVT, hx of , here for JEROME.    Problem   Marginal Insertion of Umbilical Cord Affecting Management of Mother    Had growth US 19: EFW 72%ile (2297g). Cord insertion remained marginal.     History of Macrosomia in Infant in Prior Pregnancy, Currently Pregnant    In 2018, had planned  because of a concern for fetal macrosomia; at 39wks, infant weighed 4593g at delivery. Had early GCT which was normal (111); repeat screening at 28wks was also normal (118). HbA1c was 5.5%.    Growth US 19 (32w1d): EFW 2297g (72%ile), AC c/w 34w0d gestation.     As of 19, plan for IOL/TOLAC, with understanding that she may be at risk of failure given history of macrosomia. Will do growth scan in 2-3 weeks (36-37wks) for delivery planning.     IOL scheduled for 19 at 0900.     S/P  Section    Discussed risks of TOLAC including uterine rupture and/org failure/need for  delivery. Benita understands with a history of macrosomia, she has an increased likelihood of failure. She wishes to proceed with TOLAC. Consent signed 19.     Supervision of  "High-Risk Pregnancy of Elderly Multigravida       Prenatal Care in Third Trimester    Due date: 1/19/20 by LMP c/w 8wk US  ABO/Rh: O NEG  Antibody screen: neg  H/H: 12.8/37.6  Rubella: immune  HepB Surface Antigen: NR  HIV: NR  RPR: NR  GC/CT: neg/neg  Urine Culture: neg  Pap: NIL/HPV neg  GCT: 118  Rhogam: 10/29/19  Tdap:10/29/19  GBS: ____  Flu vaccine: ____  Aneuploidy screening: ____  Anatomy scan: ____  It's a: BOY! Baby's name: ____  Circ: yes, discussed need to arrange with pediatrician  Pediatrician: ____  Infant feeding plan: breast  Contraceptive plan: ____  Birthplace Tour: ___  Birthplan: ___     Homozygous Factor V Leiden Mutation (H)    Benita has had a PE while on OCPs in 2005. Also had a UE DVE in 2011. Chronically anticoagulated with coumadin outside of pregnancy.    Currently on Lovenox 100mg BID. Last 10A level 0.97 on 11/20/19. Follows with hematology, who recommended the following for delivery planning:   \"We discussed that 24 hours must elapse from the last enoxaparin dose before she can safely have spinal or epidural anesthesia. Following delivery, therapeutic intensity anticoagulation should be restarted as soon as possible, at the discretion of the OB team.  If this cannot be done within 24 hours, we would like to be consulted for further discussion.\"    Currently planning IOL on 1/13/19 at 0900.      Rh Negative State in Antepartum Period    Rhogam given 10/29/19       Elke Espinoza S    OBGYN Attending Addendum    I appreciate the note above by medical student, Elke Espinoza.  I, Alyssia Grullon, was present with the medical student, who participated in the service and in the documentation of the note. I have verified the history and personally performed the physical exam and medical decision making. I have edited accordingly and agree with the assessment and plan of care as documented in the note.    Alyssia Grullon MD, MSCI    [facility]  "

## 2019-12-13 ENCOUNTER — OFFICE VISIT (OUTPATIENT)
Dept: OBGYN | Facility: CLINIC | Age: 39
End: 2019-12-13
Attending: OBSTETRICS & GYNECOLOGY
Payer: COMMERCIAL

## 2019-12-13 VITALS
BODY MASS INDEX: 33.56 KG/M2 | WEIGHT: 240.6 LBS | SYSTOLIC BLOOD PRESSURE: 100 MMHG | HEART RATE: 88 BPM | DIASTOLIC BLOOD PRESSURE: 66 MMHG

## 2019-12-13 DIAGNOSIS — Z34.93 PRENATAL CARE IN THIRD TRIMESTER: Primary | ICD-10-CM

## 2019-12-13 DIAGNOSIS — D68.51 HOMOZYGOUS FACTOR V LEIDEN MUTATION (H): ICD-10-CM

## 2019-12-13 DIAGNOSIS — Z67.91 RH NEGATIVE STATE IN ANTEPARTUM PERIOD: ICD-10-CM

## 2019-12-13 DIAGNOSIS — O43.199 MARGINAL INSERTION OF UMBILICAL CORD AFFECTING MANAGEMENT OF MOTHER: ICD-10-CM

## 2019-12-13 DIAGNOSIS — Z98.891 S/P CESAREAN SECTION: ICD-10-CM

## 2019-12-13 DIAGNOSIS — O09.299 HISTORY OF MACROSOMIA IN INFANT IN PRIOR PREGNANCY, CURRENTLY PREGNANT: ICD-10-CM

## 2019-12-13 DIAGNOSIS — O26.899 RH NEGATIVE STATE IN ANTEPARTUM PERIOD: ICD-10-CM

## 2019-12-13 PROCEDURE — G0463 HOSPITAL OUTPT CLINIC VISIT: HCPCS | Mod: ZF

## 2019-12-13 NOTE — NURSING NOTE
Chief Complaint   Patient presents with     Prenatal Care     34w5d       See ALEX Flowers 12/13/2019

## 2019-12-13 NOTE — LETTER
2019       RE: Benita Olivo  [address] [facility] [address]     Dear Colleague,    Thank you for referring your patient, Benita Olivo, to the [facility] at [facility]. Please see a copy of my visit note below.    [facility]  Prenatal Visit    SUBJECTIVE  Patient feels well. Had some concerns about large fetal size, whether TOLAC was a reasonable plan and desired further explanation of the risks. Currently patient experiences no contractions, no vaginal bleeding, no leakage of fluid, normal fetal movement. Patient has had a fluctuating cold for 2 weeks with mild cough and rhinorrhea; no fevers. She reports no headache, vision changes, RUQ pain, chest pain, dyspnea, constipation, diarrhea, or dysuria.      OBJECTIVE  /66 (BP Location: Right arm, Patient Position: Chair)   Pulse 88   Wt 109.1 kg (240 lb 9.6 oz)   LMP 2019   BMI 33.56 kg/m        See OB Flowsheet    ASSESSMENT/PLAN  Benita Olivo is a 39 year old  who is 34w5d, pregnancy complicated by Factor V def (homozygous), Abn pap s/p conization, hx of PE and DVT, hx of , here for JEROME.    Problem   Marginal Insertion of Umbilical Cord Affecting Management of Mother    Had growth US 19: EFW 72%ile (2297g). Cord insertion remained marginal.     History of Macrosomia in Infant in Prior Pregnancy, Currently Pregnant    In 2018, had planned  because of a concern for fetal macrosomia; at 39wks, infant weighed 4593g at delivery. Had early GCT which was normal (111); repeat screening at 28wks was also normal (118). HbA1c was 5.5%.    Growth US 19 (32w1d): EFW 2297g (72%ile), AC c/w 34w0d gestation.     As of 19, plan for IOL/TOLAC, with understanding that she may be at risk of failure given history of macrosomia. Will do growth scan in 2-3 weeks (36-37wks) for delivery planning.     IOL scheduled for 19 at  "0900.     S/P  Section    Discussed risks of TOLAC including uterine rupture and/org failure/need for  delivery. Benita understands with a history of macrosomia, she has an increased likelihood of failure. She wishes to proceed with TOLAC. Consent signed 19.     Supervision of High-Risk Pregnancy of Elderly Multigravida       Prenatal Care in Third Trimester    Due date: 20 by LMP c/w 8wk US  ABO/Rh: O NEG  Antibody screen: neg  H/H: 12.8/37.6  Rubella: immune  HepB Surface Antigen: NR  HIV: NR  RPR: NR  GC/CT: neg/neg  Urine Culture: neg  Pap: NIL/HPV neg  GCT: 118  Rhogam: 10/29/19  Tdap:10/29/19  GBS: ____  Flu vaccine: ____  Aneuploidy screening: ____  Anatomy scan: ____  It's a: BOY! Baby's name: ____  Circ: yes, discussed need to arrange with pediatrician  Pediatrician: ____  Infant feeding plan: breast  Contraceptive plan: ____  Birthplace Tour: ___  Birthplan: ___     Homozygous Factor V Leiden Mutation (H)    Benita has had a PE while on OCPs in . Also had a UE DVE in . Chronically anticoagulated with coumadin outside of pregnancy.    Currently on Lovenox 100mg BID. Last 10A level 0.97 on 19. Follows with hematology, who recommended the following for delivery planning:   \"We discussed that 24 hours must elapse from the last enoxaparin dose before she can safely have spinal or epidural anesthesia. Following delivery, therapeutic intensity anticoagulation should be restarted as soon as possible, at the discretion of the OB team.  If this cannot be done within 24 hours, we would like to be consulted for further discussion.\"    Currently planning IOL on 19 at 0900.      Rh Negative State in Antepartum Period    Rhogam given 10/29/19       Elke THOMAS    OBGYN Attending Addendum    I appreciate the note above by medical student, Elke Espinoza.  I, Alyssia Grullon, was present with the medical student, who participated in the service and in the documentation of the " note. I have verified the history and personally performed the physical exam and medical decision making. I have edited accordingly and agree with the assessment and plan of care as documented in the note.    Alyssia Grullon MD, MSCI    [facility]

## 2019-12-26 ENCOUNTER — OFFICE VISIT (OUTPATIENT)
Dept: OBGYN | Facility: CLINIC | Age: 39
End: 2019-12-26
Attending: OBSTETRICS & GYNECOLOGY
Payer: COMMERCIAL

## 2019-12-26 VITALS
HEART RATE: 98 BPM | BODY MASS INDEX: 34.17 KG/M2 | SYSTOLIC BLOOD PRESSURE: 108 MMHG | WEIGHT: 245 LBS | DIASTOLIC BLOOD PRESSURE: 73 MMHG

## 2019-12-26 DIAGNOSIS — Z34.83 ENCOUNTER FOR SUPERVISION OF OTHER NORMAL PREGNANCY IN THIRD TRIMESTER: Primary | ICD-10-CM

## 2019-12-26 DIAGNOSIS — D68.51 HOMOZYGOUS FACTOR V LEIDEN MUTATION (H): ICD-10-CM

## 2019-12-26 PROCEDURE — 87081 CULTURE SCREEN ONLY: CPT | Performed by: STUDENT IN AN ORGANIZED HEALTH CARE EDUCATION/TRAINING PROGRAM

## 2019-12-26 PROCEDURE — G0463 HOSPITAL OUTPT CLINIC VISIT: HCPCS | Mod: ZF

## 2019-12-26 ASSESSMENT — PAIN SCALES - GENERAL: PAINLEVEL: NO PAIN (0)

## 2019-12-26 ASSESSMENT — ANXIETY QUESTIONNAIRES
GAD7 TOTAL SCORE: 0
1. FEELING NERVOUS, ANXIOUS, OR ON EDGE: NOT AT ALL
7. FEELING AFRAID AS IF SOMETHING AWFUL MIGHT HAPPEN: NOT AT ALL
6. BECOMING EASILY ANNOYED OR IRRITABLE: NOT AT ALL
3. WORRYING TOO MUCH ABOUT DIFFERENT THINGS: NOT AT ALL
5. BEING SO RESTLESS THAT IT IS HARD TO SIT STILL: NOT AT ALL
2. NOT BEING ABLE TO STOP OR CONTROL WORRYING: NOT AT ALL

## 2019-12-26 ASSESSMENT — PATIENT HEALTH QUESTIONNAIRE - PHQ9
SUM OF ALL RESPONSES TO PHQ QUESTIONS 1-9: 0
5. POOR APPETITE OR OVEREATING: NOT AT ALL

## 2019-12-26 NOTE — PROGRESS NOTES
JEROME Visit    S: She is feeling well, feels big. Thinks this baby feels similar to her last. Still needs to schedule growth US. Denies lots of swelling. Still planning TOLAC though interested in what growth US will say.     Having lower abdominal pressure, no contractions. Denies VB, LOF. Normal fetal movement.     Denies VB, LOF. Normal fetal movement.     O: /73   Pulse 98   Wt 111.1 kg (245 lb)   LMP 2019   BMI 34.17 kg/m      See OB flowsheet.     A/P:   40 yo  at 36w4d here for JEROME visit.     #PNC  - GBS obtained today, declined cervical check.   - S/p rhogam at 28 weeks  - Declined genetic counseling/screening  - Normal level 2 US    #Marginal cord insertion   - Re-demonstrated  MFM US  - EFW wnl     #Hx of fetal macrosomia  #Hx of  section  - Last delivery for fetal macrosomia, infant weight 4593g  - Early and repeat GCT wnl, Hgb A1c wnl  -  growth US with EFW 72%ile  - Planning IOL/TOLAC, scheduled 20 at 0900  - TOLAC consent signed 19  - Patient to schedule Growth US for next week    #Factor V Leiden  - Hx PE on OCPs, UE DVT   - Pre-pregnancy on warfarin   - On lovenox 900mg BID  -  XA level 0.97  - Follows with hematology  - Plan for stopping lovenox 24hrs prior to planned delivery    #Hx AIS  - S/p CKC , pap/ECC neg then annual co-testing negative since    #Hx PPH  - EBL 750cc from vaginal delivery 2/2 atony and bleeding 2nd degree laceration  - Resolved with rectal miso and repair of lac    RTC in 1 week.     Staffed with Dr. Fernandez.     Madelyn Castro MD PGY1  Obstetrics & Gynecology  19     Appreciate note by Dr. Castro. Patient has been seen and examined by me with the resident, agree with above note.     Doreen Fernandez MD

## 2019-12-26 NOTE — LETTER
2019       RE: Benita Olivo  [address] [facility] [address]     Dear Colleague,    Thank you for referring your patient, Benita Olivo, to the [facility] at [facility]. Please see a copy of my visit note below.    JEROME Visit    S: She is feeling well, feels big. Thinks this baby feels similar to her last. Still needs to schedule growth US. Denies lots of swelling. Still planning TOLAC though interested in what growth US will say.     Having lower abdominal pressure, no contractions. Denies VB, LOF. Normal fetal movement.     Denies VB, LOF. Normal fetal movement.     O: /73   Pulse 98   Wt 111.1 kg (245 lb)   LMP 2019   BMI 34.17 kg/m       See OB flowsheet.     A/P:   38 yo  at 36w4d here for JEROME visit.     #PNC  - GBS obtained today, declined cervical check.   - S/p rhogam at 28 weeks  - Declined genetic counseling/screening  - Normal level 2 US    #Marginal cord insertion   - Re-demonstrated  MFM US  - EFW wnl     #Hx of fetal macrosomia  #Hx of  section  - Last delivery for fetal macrosomia, infant weight 4593g  - Early and repeat GCT wnl, Hgb A1c wnl  -  growth US with EFW 72%ile  - Planning IOL/TOLAC, scheduled 20 at 0900  - TOLAC consent signed 19  - Patient to schedule Growth US for next week    #Factor V Leiden  - Hx PE on OCPs, UE DVT   - Pre-pregnancy on warfarin   - On lovenox 900mg BID  -  XA level 0.97  - Follows with hematology  - Plan for stopping lovenox 24hrs prior to planned delivery    #Hx AIS  - S/p CKC , pap/ECC neg then annual co-testing negative since    #Hx PPH  - EBL 750cc from vaginal delivery 2/2 atony and bleeding 2nd degree laceration  - Resolved with rectal miso and repair of lac    RTC in 1 week.     Staffed with Dr. Fernandez.     Madelyn Castro MD PGY1  Obstetrics & Gynecology  19     Appreciate note by Dr. Castro. Patient has been seen  and examined by me with the resident, agree with above note.     Doreen Fernandez MD

## 2019-12-27 ASSESSMENT — ANXIETY QUESTIONNAIRES: GAD7 TOTAL SCORE: 0

## 2019-12-29 LAB
BACTERIA SPEC CULT: NORMAL
Lab: NORMAL
SPECIMEN SOURCE: NORMAL

## 2020-01-02 NOTE — PROGRESS NOTES
"JEROME Visit    S: She is feeling well overall, no concerns. Feels baby is similar size to her last. Growth US today with baby measuring >99%ile, EFW 4591g.    Having lower abdominal pressure, no contractions. Denies VB, LOF. Normal fetal movement.     Denies VB, LOF. Normal fetal movement.     O: /72 (BP Location: Right arm, Patient Position: Chair)   Pulse 91   Ht 1.803 m (5' 11\")   Wt 111.8 kg (246 lb 8 oz)   LMP 2019   BMI 34.38 kg/m      OB US today.     A/P:   40 yo  at 37w5d by LMP cw 8w6d US here for JEROME visit. Growth US predicting EFW 4591g, >99%ile. In 2 weeks at 39wks, likely EFW ~5000g. Discussed risks of TOLAC with new EFW vs risks of repeat CS. She is interested in repeat CS. Case request placed today.      #PNC  - GBS negative  - S/p rhogam at 28 weeks  - Declined genetic counseling/screening  - Normal level 2 US    #Marginal cord insertion   - Re-demonstrated  MFM US  - Repeat MFM US today  - EFW 72%ile    #Hx of fetal macrosomia  #Hx of  section  - Last delivery for fetal macrosomia, infant weight 4593g  - Early and repeat GCT wnl, Hgb A1c wnl  -  growth US with EFW 72%ile  - Initially planning IOL/TOLAC, scheduled 20 at 0900- changed mind today  - TOLAC consent signed 19  - Growth US today with EFW 4591g, >99%ile, AC >99%ile  - Plan for repeat  section, 20 at 10:30    #Factor V Leiden  - Hx PE on OCPs, UE DVT   - Pre-pregnancy on warfarin   - On lovenox 900mg BID  -  XA level 0.97  - Follows with hematology  - Plan for stopping lovenox 24hrs prior to planned delivery    #Hx AIS  - S/p CKC , pap/ECC neg then annual co-testing negative since    #Hx PPH  - EBL 750cc from vaginal delivery 2/2 atony and bleeding 2nd degree laceration  - Resolved with rectal miso and repair of lac    RTC in 1 week.     Staffed with Dr. Manriquez.     Madelyn Castro MD PGY1  Obstetrics & Gynecology  20       I, Maria Luisa Manriquez, saw this patient with " the resident and agree with the resident's findings and plan of care as documented in the resident's note.   I personally reviewed history and physical exam. Key findings: suspected macrosomia, history of  for macrosomia.  Maria Luisa Manriquez MD

## 2020-01-03 ENCOUNTER — OFFICE VISIT (OUTPATIENT)
Dept: OBGYN | Facility: CLINIC | Age: 40
End: 2020-01-03
Payer: COMMERCIAL

## 2020-01-03 ENCOUNTER — ANCILLARY PROCEDURE (OUTPATIENT)
Dept: ULTRASOUND IMAGING | Facility: CLINIC | Age: 40
End: 2020-01-03
Attending: OBSTETRICS & GYNECOLOGY
Payer: COMMERCIAL

## 2020-01-03 VITALS
BODY MASS INDEX: 34.51 KG/M2 | HEIGHT: 71 IN | HEART RATE: 91 BPM | SYSTOLIC BLOOD PRESSURE: 106 MMHG | DIASTOLIC BLOOD PRESSURE: 72 MMHG | WEIGHT: 246.5 LBS

## 2020-01-03 DIAGNOSIS — Z34.83 ENCOUNTER FOR SUPERVISION OF OTHER NORMAL PREGNANCY IN THIRD TRIMESTER: Primary | ICD-10-CM

## 2020-01-03 DIAGNOSIS — O09.299 HISTORY OF MACROSOMIA IN INFANT IN PRIOR PREGNANCY, CURRENTLY PREGNANT: ICD-10-CM

## 2020-01-03 DIAGNOSIS — Z34.93 PRENATAL CARE IN THIRD TRIMESTER: ICD-10-CM

## 2020-01-03 PROCEDURE — G0463 HOSPITAL OUTPT CLINIC VISIT: HCPCS | Mod: ZF

## 2020-01-03 PROCEDURE — 76816 OB US FOLLOW-UP PER FETUS: CPT

## 2020-01-03 RX ORDER — BREAST PUMP
1 EACH MISCELLANEOUS CONTINUOUS
Qty: 1 EACH | Refills: 0 | Status: SHIPPED | OUTPATIENT
Start: 2020-01-03 | End: 2020-03-10

## 2020-01-03 ASSESSMENT — MIFFLIN-ST. JEOR: SCORE: 1889.25

## 2020-01-09 ENCOUNTER — OFFICE VISIT (OUTPATIENT)
Dept: OBGYN | Facility: CLINIC | Age: 40
End: 2020-01-09
Attending: OBSTETRICS & GYNECOLOGY
Payer: COMMERCIAL

## 2020-01-09 ENCOUNTER — TELEPHONE (OUTPATIENT)
Dept: OBGYN | Facility: CLINIC | Age: 40
End: 2020-01-09

## 2020-01-09 VITALS
DIASTOLIC BLOOD PRESSURE: 70 MMHG | BODY MASS INDEX: 34.22 KG/M2 | HEIGHT: 71 IN | HEART RATE: 86 BPM | SYSTOLIC BLOOD PRESSURE: 105 MMHG | WEIGHT: 244.4 LBS

## 2020-01-09 DIAGNOSIS — O34.219 PREVIOUS CESAREAN DELIVERY, ANTEPARTUM CONDITION OR COMPLICATION: ICD-10-CM

## 2020-01-09 DIAGNOSIS — O09.93 HIGH-RISK PREGNANCY IN THIRD TRIMESTER: Primary | ICD-10-CM

## 2020-01-09 PROCEDURE — G0463 HOSPITAL OUTPT CLINIC VISIT: HCPCS | Mod: ZF

## 2020-01-09 ASSESSMENT — MIFFLIN-ST. JEOR: SCORE: 1879.72

## 2020-01-09 NOTE — TELEPHONE ENCOUNTER
[facility] paperwork completed, signed, faxed to [employer], and original in [place] book. Copy to patient

## 2020-01-09 NOTE — LETTER
"2020       RE: Benita Olivo  [address] [facility] [address]     Dear Colleague,    Thank you for referring your patient, Benita Olivo, to the [facility] at [facility]. Please see a copy of my visit note below.    Doing well. Good movement. No ctx, lof, vb. Planning repeat CS- wonders if right decision.     O: /70 (BP Location: Right arm, Patient Position: Chair)   Pulse 86   Ht 1.803 m (5' 11\")   Wt 110.9 kg (244 lb 6.4 oz)   LMP 2019   BMI 34.09 kg/m       A/P: 39 year old  @38w4d HRP  H/o CS:  followed by primary CS for suspected macrosomia ([id number]). Discussed repeat CS vs TOLAC. EFW this pregnancy 57wa6pf last week. Reviewed similar situation to last time with now risk of TOLAC if tried for vaginal delivery. Will continue plan for repeat CS. No plan for BTL.   CS instructions reviewed today. Will do labs tomorrow.   FVL: On therapeutic lovenox lifelong. Reviewed timing to hold lovenox prior to CS.   Labor precautions reviewed.     Doreen Fernandez MD            "

## 2020-01-10 ENCOUNTER — ANTICOAGULATION THERAPY VISIT (OUTPATIENT)
Dept: ANTICOAGULATION | Facility: CLINIC | Age: 40
End: 2020-01-10

## 2020-01-10 ENCOUNTER — HOSPITAL ENCOUNTER (OUTPATIENT)
Facility: CLINIC | Age: 40
Setting detail: SPECIMEN
End: 2020-01-10
Payer: COMMERCIAL

## 2020-01-10 DIAGNOSIS — I82.409 DEEP VEIN THROMBOSIS (DVT) (H): ICD-10-CM

## 2020-01-10 DIAGNOSIS — Z79.01 LONG TERM CURRENT USE OF ANTICOAGULANT THERAPY: ICD-10-CM

## 2020-01-10 DIAGNOSIS — D68.51 HOMOZYGOUS FACTOR V LEIDEN MUTATION (H): ICD-10-CM

## 2020-01-10 LAB
APTT PPP: 34 SEC (ref 22–37)
ERYTHROCYTE [DISTWIDTH] IN BLOOD BY AUTOMATED COUNT: 13.9 % (ref 10–15)
HCT VFR BLD AUTO: 36.6 % (ref 35–47)
HGB BLD-MCNC: 11.9 G/DL (ref 11.7–15.7)
INR PPP: 1.02 (ref 0.86–1.14)
MCH RBC QN AUTO: 30.1 PG (ref 26.5–33)
MCHC RBC AUTO-ENTMCNC: 32.5 G/DL (ref 31.5–36.5)
MCV RBC AUTO: 92 FL (ref 78–100)
PLATELET # BLD AUTO: 160 10E9/L (ref 150–450)
RBC # BLD AUTO: 3.96 10E12/L (ref 3.8–5.2)
WBC # BLD AUTO: 14.3 10E9/L (ref 4–11)

## 2020-01-10 PROCEDURE — 86923 COMPATIBILITY TEST ELECTRIC: CPT | Performed by: OBSTETRICS & GYNECOLOGY

## 2020-01-10 PROCEDURE — 85610 PROTHROMBIN TIME: CPT | Performed by: OBSTETRICS & GYNECOLOGY

## 2020-01-10 PROCEDURE — 86850 RBC ANTIBODY SCREEN: CPT | Performed by: OBSTETRICS & GYNECOLOGY

## 2020-01-10 PROCEDURE — 85027 COMPLETE CBC AUTOMATED: CPT | Performed by: OBSTETRICS & GYNECOLOGY

## 2020-01-10 PROCEDURE — 86901 BLOOD TYPING SEROLOGIC RH(D): CPT | Performed by: OBSTETRICS & GYNECOLOGY

## 2020-01-10 PROCEDURE — 85730 THROMBOPLASTIN TIME PARTIAL: CPT | Performed by: OBSTETRICS & GYNECOLOGY

## 2020-01-10 PROCEDURE — 36415 COLL VENOUS BLD VENIPUNCTURE: CPT | Performed by: OBSTETRICS & GYNECOLOGY

## 2020-01-10 PROCEDURE — 86900 BLOOD TYPING SEROLOGIC ABO: CPT | Performed by: OBSTETRICS & GYNECOLOGY

## 2020-01-10 NOTE — PROGRESS NOTES
"Doing well. Good movement. No ctx, lof, vb. Planning repeat CS- wonders if right decision.     O: /70 (BP Location: Right arm, Patient Position: Chair)   Pulse 86   Ht 1.803 m (5' 11\")   Wt 110.9 kg (244 lb 6.4 oz)   LMP 2019   BMI 34.09 kg/m      A/P: 39 year old  @38w4d HRP  H/o CS:  followed by primary CS for suspected macrosomia ([id number]). Discussed repeat CS vs TOLAC. EFW this pregnancy 57yh1ps last week. Reviewed similar situation to last time with now risk of TOLAC if tried for vaginal delivery. Will continue plan for repeat CS. No plan for BTL.   CS instructions reviewed today. Will do labs tomorrow.   FVL: On therapeutic lovenox lifelong. Reviewed timing to hold lovenox prior to CS.   Labor precautions reviewed.     Doreen Fernandez MD      "

## 2020-01-10 NOTE — H&P
[facility]  OB History and Physical      Benita Olivo MRN# [id number]   Age: 39 year old YOB: 1980     CC:  Scheduled     HPI:  Ms. Benita Olivo is a 39 year old  at 39w1d by LMP c/w 8w6d US who presents for scheduled .  She denies contractions, vaginal bleeding, and loss of fluid.   + normal fetal movement.    Pregnancy Complications:  - Suspected fetal macrosomia, EFW 4900-5000g  - AMA, normal level II, declined genetic screening  - Marginal cord insertion  - Factor V Leiden homozygous, h/o DVT/PE on OCPs, on lovenox  - h/o AIS, s/p CKC in  with subsequent neg cotesting yearly  - h/o CS x1 for fetal macrosomia    Prenatal Labs:   Lab Results   Component Value Date    ABO O 01/10/2020    RH Neg 01/10/2020    AS Neg 01/10/2020    HEPBANG Nonreactive 2019    CHPCRT Negative 2019    GCPCRT Negative 2019    TREPAB Negative 2018    RUBELLAABIGG 76 2012    HGB 11.9 01/10/2020       GBS Status:   Lab Results   Component Value Date    GBS Negative 2018       Ultrasounds  19 US, dating, 8w4d  1/3/19 39 year old female, , presents at 37 5/7 weeks with an RIVAS of 2020 for obstetric ultrasound assessment indicated by h/o macrosomia.     Single fetus     Presentation - oblique, head to maternal right.     USEGA = 39 6/7 weeks.  EFW = 4,591 grams, >99 % for 37 weeks. 10#2oz.     Fetal anatomy visualized and appears normal.     AF MVP = 6.2cm.  FHR = 141bpm .       Placenta anterior, no previa.     Comments: LGA pattern of growth, fetal macrosomia is suspected. Oblique Lie     Findings discussed with patient.     Further studies as clinically indicated.     RACHELL Shore MD    OB History  OB History    Para Term  AB Living   3 2 2 0 0 2   SAB TAB Ectopic Multiple Live Births   0 0 0 0 2      # Outcome Date GA Lbr Jorge/2nd Weight Sex Delivery Anes PTL Lv   3  Current            2 Term 18 39w0d  4.593 kg (10 lb 2 oz) F CS-LTranv  N JAZ      Name: GREGOR MURRIETA      Apgar1: 9  Apgar5: 9   1 Term 16 39w6d / 02:57 3.98 kg (8 lb 12.4 oz) M Vag-Spont EPI  JAZ      Apgar1: 9  Apgar5: 9      Obstetric Comments   Induced d/t anticoagulant use. + PPH. Denies GDM, HTN, PPD.       PMHx:   Past Medical History:   Diagnosis Date     Abnormal Pap smear of cervix     conization Dr. Fernandez.  f/u , paps normal      ASCUS on Pap smear 2012    + hpv,     BMI 32.0-32.9,adult      DVT of upper extremity (deep vein thrombosis) (H)     right arm      Factor V deficiency (H)     homozygous      Mass     URETHRAl mass      PE (pulmonary embolism)     was on birth control at time, hosp x 4-5 d      PSHx:   Past Surgical History:   Procedure Laterality Date     APPENDECTOMY OPEN CHILD        SECTION N/A 2018    Procedure:  SECTION;  Primary  Section;  Surgeon: Junie Hightower MD;  [facility] L[facility]     CONIZATION  2012    Procedure: CONIZATION;  Cold Knife [facility]   Choice anesthesia;  Surgeon: Doreen Fernandez MD;  [facility] OR     CYSTOSCOPY, EXCISE URETHRAL CARUNCLE, COMBINED  2012    Procedure: COMBINED CYSTOSCOPY, EXCISE URETHRAL CARUNCLE;  Cystoscopy and Excision of Urethral Mass;  Surgeon: Rasheeda Salazar MD;  [facility] OR     wisdom teeth       Meds:   Medications Prior to Admission   Medication Sig Dispense Refill Last Dose     cholecalciferol (VITAMIN D3) 1000 units (25 mcg) capsule Take 1 capsule (1,000 Units) by mouth daily Take one capsule daily. 90 capsule 3 Taking     enoxaparin (LOVENOX) 100 MG/ML syringe Inject 90 mg enoxaparin every 12 hours. 60 Syringe 6 Taking     Misc. Devices (BREAST PUMP) MISC 1 each continuous 1 each 0      Prenatal Vit-Fe Fumarate-FA (PRENATAL VITAMIN PO)    Taking     ranitidine (RANITIDINE 150 MAX STRENGTH) 150 MG tablet Take 1 tablet (150 mg) by  mouth 2 times daily (Patient not taking: Reported on 2019) 60 tablet 3 Not Taking     [] rho,D, immune globulin (HYPERRHO/RHOGAM) 300 MCG injection Inject 300 mcg into the muscle once for 1 dose 300 mcg 0      [] rho,D, immune globulin (HYPERRHO/RHOGAM) 300 MCG injection Inject 300 mcg into the muscle once for 1 dose 300 mcg 0      VITAMIN D, CHOLECALCIFEROL, PO Take 2,000 Units by mouth daily    Taking     Allergies:    Allergies   Allergen Reactions     Misoprostol      Tolerated multiple low doses miso induction for labor. However, one hour after delivery when she received 800 mcg miso, her skin turned blue in color and face pale and had tachycardia. No throat closure or difficulty breathing or chest pain. Quickly resolved with IV Benadryl. Patient satted just fine. Could receive again in the future; however, monitor for this side effect.      FmHx:   Family History   Problem Relation Age of Onset     Cardiovascular Paternal Grandmother         had aneurism     Blood Disease Sister         factor V     Blood Disease Brother         factor V  brother carry gene     Blood Disease Mother         factor V     Blood Disease Father         factor V     Circulatory Maternal Uncle         uncle with blood clots     Circulatory Paternal Uncle         uncle with blood clot     SocHx: She denies any tobacco, alcohol, or other drug use during this pregnancy.    ROS:   Complete 10-point ROS negative except as noted in HPI.She denies headache, blurry vision, chest pain, shortness of breath, RUQ pain, nausea, vomiting, dysuria, hematuria or extremity edema.    PE:  Vit:   Patient Vitals for the past 4 hrs:   BP Temp Temp src   20 0928 115/72 98  F (36.7  C) Oral      Gen: Well-appearing, NAD, comfortable   CV: rrr, no mrg   Pulm: Ctab, no wheezes or crackles   Abd: Soft, gravid, non-tender  Ext: trace LE edema b/l    Pres:  Oblique, head maternal right by BSUS  EFW:  4900g by recent growth  US  Memb: intact              FHT: Baseline 135, mod variability, + accelerations, no decelerations   [facility]: 0-1 contractions in 10 minutes      Assessment  Ms. Benita Olivo is a 39 year old , at 38w5d by LMP c/w 8w6d US who presents for scheduled repeat .    # Scheduled Repeat  Section  Indicated due to h/o CS x1, suspected macrosomia, malpresentation. Prior (s) for suspected macrosomia. Previous op notes reported no adhesions. Will plan for a Pfannenstiel skin incision with low transverse hysterotomy  - Labs: CBC, T&S, RPR   - Pre-op Hgb and Plts pending  - Placenta: anterior  - Anesthesia: Spinal   - 2g Ancef   - PPH Meds/Ppx: routine  - Diet: NPO  - PPx: SCDs  - Consent: Discussed risks and benefits of procedure, including but not limited to bleeding, infection, injury to surrounding organs, injury to infant, and the potential need for another surgery should some injury go unrecognized or patient were to have continued bleeding. Patient had time to ask questions and expressed understanding of procedure and associated risks. Agreed to blood transfusion if necessary. Consent signed.    #FVL homozygous mutation, h/o DVT/PE  - PTA Lovenox 100 mg BID held x24h preop  - Will resume POD#0 if PM hgb stable  - Follow up with hematology as outpatient     #h/o AIS  - s/p CKC in  with subsequent neg yearly cotesting  - Plan to continue screening yearly    # PNC  - Rh pos, Rubella imm, GCT passed, GBS neg  - Other prenatal labs wnl  - s/p Tdap vaccine, no flu shot, will offer PP     # FWB:   Cat I tracing, reactive; oblique by BSUS; EFW 5000g  - NICU for delivery     The patient was discussed with Dr. Clemente who is in agreement with the treatment plan.    Lori Obando MD  OB/GYN PGY-2  20 10:03 AM     The patient was seen and evaluated by me separately from the team.  I have reviewed and agree with the above note.  Plan to proceed with repeat c/s as above given suspected  macrosomia and oblique lie.    Olga Clemente MD, FACOG

## 2020-01-10 NOTE — DISCHARGE SUMMARY
[facility] Discharge Summary    Benita Olivo MRN# [id number]   Age: 39 year old YOB: 1980     Date of Admission:  2020  Date of Discharge:  20   Admitting Physician:  Olga Clemente MD  Discharge Physician:  Doreen Fernandez MD    Admit Dx:   - Intrauterine pregnancy at 39w1d  - history of  x1  - AMA  - Marginal cord insertion  - FVL homozygous  - h/o DVT/PE  - h/o AIS  - fetal malpresentation    Discharge Dx:  - Same as above, s/p repeat low transverse  section    Procedures:  - Repeat low transverse  section with double layer uterine closure via Pfannenstiel incision  - Spinal analgesia  - TAP block    Admit HPI:  Benita Olivo is a 39 year old  at 39w1d admitted for scheduled repeat .  The risks, benefits, and alternatives of  section were discussed with the patient, and she agreed to proceed.   Please see her admit H&P for full details of her PMH, PSH, Meds, Allergies and exam on admit.    Operative Course:  Surgery was uncomplicated. QBL from the delivery was 548 mL. Please see her  Section Operative Note for full details regarding her delivery.    Operative Findings:   1. Clear amniotic fluid  2. Liveborn male infant in CEE presentation. Apgars 8 at 1 minute & 9 at 5 minutes. Weight 4763 g.  3. Hematoma at right interior aspect of hysterotomy, 2 x 4 cm, stable over approximately 10 minutes of observation.   4. Normal uterus, fallopian tubes, and ovaries.     Postoperative Course:  Her postoperative course was uncomplicated. On POD#3, she was meeting all of her postpartum goals and deemed stable for discharge. She was voiding without difficulty, tolerating a regular diet without nausea and vomiting, her pain was well controlled on oral pain medicines and her lochia was appropriate. Her hemoglobin was 10.0 after delivery. Her Rh status was negative and Rhogam was given. She was  continued on Lovenox 100 mg BID for VTE prophylaxis given Factor V Leiden homozygous.     Discharge Medications:     Review of your medicines      START taking      Dose / Directions   acetaminophen 325 MG tablet  Commonly known as:  TYLENOL  Used for:  S/P  section      Dose:  650 mg  Take 2 tablets (650 mg) by mouth every 6 hours as needed for mild pain Start after Delivery.  Quantity:  100 tablet  Refills:  0     ibuprofen 600 MG tablet  Commonly known as:  ADVIL/MOTRIN  Used for:  S/P  section      Dose:  600 mg  Take 1 tablet (600 mg) by mouth every 6 hours as needed for moderate pain Start after delivery  Quantity:  60 tablet  Refills:  0     mupirocin 2 % external ointment  Commonly known as:  BACTROBAN  Used for:  S/P  section      Apply topically 3 times daily  Quantity:  30 g  Refills:  0     oxyCODONE 5 MG tablet  Commonly known as:  ROXICODONE      Dose:  5 mg  Take 1 tablet (5 mg) by mouth every 4 hours as needed for pain  Quantity:  8 tablet  Refills:  0     senna-docusate 8.6-50 MG tablet  Commonly known as:  SENOKOT-S/PERICOLACE  Used for:  S/P  section      Dose:  1 tablet  Take 1 tablet by mouth daily Start after delivery.  Quantity:  100 tablet  Refills:  0        CONTINUE these medicines which have NOT CHANGED      Dose / Directions   breast pump Misc  Used for:  Encounter for supervision of other normal pregnancy in third trimester      Dose:  1 each  1 each continuous  Quantity:  1 each  Refills:  0     enoxaparin ANTICOAGULANT 100 MG/ML syringe  Commonly known as:  LOVENOX  Used for:  Chronic anticoagulation, Personal history of DVT (deep vein thrombosis), Homozygous Factor V Leiden mutation (H), Less than 8 weeks gestation of pregnancy      Inject 90 mg enoxaparin every 12 hours.  Quantity:  60 Syringe  Refills:  6     PRENATAL VITAMIN PO      Refills:  0     ranitidine 150 MG tablet  Commonly known as:  raNITIdine 150 Max Strength  Used for:  Heartburn during  pregnancy in second trimester      Dose:  150 mg  Take 1 tablet (150 mg) by mouth 2 times daily  Quantity:  60 tablet  Refills:  3     * VITAMIN D (CHOLECALCIFEROL) PO      Dose:  2,000 Units  Take 2,000 Units by mouth daily  Refills:  0     * cholecalciferol 1000 units (25 mcg) capsule  Commonly known as:  VITAMIN D3  Used for:  Vitamin D deficiency      Dose:  1 capsule  Take 1 capsule (1,000 Units) by mouth daily Take one capsule daily.  Quantity:  90 capsule  Refills:  3         * This list has 2 medication(s) that are the same as other medications prescribed for you. Read the directions carefully, and ask your doctor or other care provider to review them with you.            STOP taking    rho(D) immune globulin 300 MCG injection  Commonly known as:  HYPERRHO/RHOGAM              Where to get your medicines      These medications were sent to [facility] [place], [place] - [address]  [address] [facility] [address], [facility] [address]    Phone:  [phone]     acetaminophen 325 MG tablet    ibuprofen 600 MG tablet    mupirocin 2 % external ointment    senna-docusate 8.6-50 MG tablet     Some of these will need a paper prescription and others can be bought over the counter. Ask your nurse if you have questions.    Bring a paper prescription for each of these medications    oxyCODONE 5 MG tablet          Discharge/Disposition:  Benita Olivo was discharged to home in stable condition with the following instructions/medications:  1) Call for temperature > 100.4, bright red vaginal bleeding >1 pad an hour x 2 hours, foul smelling vaginal discharge, pain not controlled by usual oral pain meds, persistent nausea and vomiting not controlled on medications, drainage or redness from incision site  2) She declined contraception  3) For feeding she decided to breastfeed.  4) She was instructed to follow-up with her primary OB in 6 weeks for a routine postpartum visit.  5) Discharge activity:  No heavy  lifting >15 lbs or strenuous activity for 6 weeks, pelvic rest for 6 weeks, no driving or operating machinery while on narcotics.    Maritza Araiza MD  Ob/Gyn PGY-3  01/16/20 6:26 AM      Appreciate note by Dr. Araiza. Patient has been seen and examined by me separate from the resident, agree with above note.     Doreen Fernandez MD  10:45 AM

## 2020-01-10 NOTE — OP NOTE
[facility]  Full Operative Progress Note     Surgery Date:  2020  Surgeon:  Olga Clemente MD  Assistants:  Bridgette Meadows MD, fellow, Lori Obando MD PGY-2      Pre-op Diagnosis:  1. Intrauterine pregnancy at 39w1d     2. Suspected fetal macrosomia     3. Oblique fetal lie     4.  History of previous  section     2. AMA     3. Marginal cord insertion     4. Factor V Leiden homozygote     5. History of DVT/PE     7. History of AIS    Post-op Diagnosis:  1. Same      2. Liveborn male infant   Procedure:  Repeat low-transverse  section with double layer uterine closure via Pfannenstiel incision    Anesthesia: spinal  QBL:  548 mL  IVF:  750 mL crystalloid  UOP:  200 mL clear urine at the end of the case  Drains: Horton Catheter   Specimens:  Cord blood, cord gases, placenta  Complications: None   Indications:   Benita Olivo is a 39 year old  at 39w1d admitted for scheduled repeat .  The risks, benefits, and alternatives of  section were discussed with the patient, and she agreed to proceed.     Findings:   1. Clear amniotic fluid  2. Liveborn male infant in CEE presentation. Apgars 8 at 1 minute & 9 at 5 minutes. Weight 4763 g.  3. Hematoma at right interior aspect of hysterotomy, 2 x 4 cm, stable over approximately 10 minutes of observation.   4. Normal uterus, fallopian tubes, and ovaries.     Procedure Details:   The patient was brought to the OR, where adequate spinal anesthesia was administered.  She was placed in the dorsal supine position with a slight leftward tilt. A horton catheter was placed into her bladder.  She was prepped and draped in the usual sterile fashion. A surgical time out was performed. A pfannenstiel skin incision was made with the scalpel following her previous incisional scar, and carried down to the underlying fascia with sharp and blunt dissection. The fascia was incised in the midline, and the incision was  extended laterally with the Turcios scissors. The superior aspect of the fascia was grasped with the Kocher clamps and dissected off of the underlying rectus muscles with blunt and sharp dissection. Attention was then turned to the inferior aspect of the fascia, which was similarly dissected off of the underlying rectus muscles. The rectus muscles were  in the midline, and the peritoneum was entered bluntly, and the opening was extended with good visualization of the bladder. The bladder blade was placed. A transverse hysterotomy was made with the scalpel in the lower uterine segment, and the incision was extended with digital pressure. The infant was noted to be in the CEE position, and was delivered atraumatically. The shoulders delivered easily.  No nuchal cord was noted. The cord was doubly clamped and cut, and the infant was handed off to the awaiting NICU staff. A segment of cord was cut and held as needed. The placenta was delivered with gentle traction on the umbilical cord and uterine massage. The uterus was exteriorized and cleared of all clots and debris. Uterine tone was noted to be fair with pitocin given through the running IV and uterine massage.  The hysterotomy was closed with a running locked suture of 0 Vicryl.  The hysterotomy was then imbricated using an 0 Vicryl suture. The hysterotomy was noted to be hemostatic. The posterior cul-de-sac was cleared of all clots and debris. The uterus was returned to the abdomen. The pericolic gutters were cleared of all clots and debris. The hysterotomy was reexamined and noted to be hemostatic. A half sheet of SepraFilm was placed over the hysterotomy. The fascia and rectus muscles were examined and areas of oozing were controlled with electrocautery. A second half sheet of SepraFilm was placed over the rectus muscles. The fascia was closed with a running 0 Vicryl suture. The subcutaneous tissue was irrigated and areas of oozing were controlled with  electrocautery. The subcutaneous tissue was greater than 2 cm in thickness, and was therefore closed with 3-0 Vicryl. The skin was closed with 4-0 Monocryl and covered with a sterile dressing.    All sponge, needle, and instrument counts were correct. The patient tolerated the procedure well, and was transferred to recovery in stable condition. Dr. Clemente was present and scrubbed for the entirety of the procedure.     Lori Obando MD  OB/GYN PGY-2  01/13/20 12:00 PM     I was scrubbed and present for the entire procedure.  I have reviewed and edited the above note.    Olga Clemente MD, FACOG

## 2020-01-12 ENCOUNTER — ANESTHESIA EVENT (OUTPATIENT)
Dept: OBGYN | Facility: CLINIC | Age: 40
End: 2020-01-12
Payer: COMMERCIAL

## 2020-01-13 ENCOUNTER — HOSPITAL ENCOUNTER (INPATIENT)
Facility: CLINIC | Age: 40
LOS: 3 days | Discharge: HOME | End: 2020-01-16
Attending: OBSTETRICS & GYNECOLOGY | Admitting: OBSTETRICS & GYNECOLOGY
Payer: COMMERCIAL

## 2020-01-13 ENCOUNTER — ANESTHESIA (OUTPATIENT)
Dept: OBGYN | Facility: CLINIC | Age: 40
End: 2020-01-13
Payer: COMMERCIAL

## 2020-01-13 DIAGNOSIS — Z98.891 S/P CESAREAN SECTION: Primary | ICD-10-CM

## 2020-01-13 DIAGNOSIS — Z98.891 STATUS POST REPEAT LOW TRANSVERSE CESAREAN SECTION: ICD-10-CM

## 2020-01-13 DIAGNOSIS — Z34.83 ENCOUNTER FOR SUPERVISION OF OTHER NORMAL PREGNANCY IN THIRD TRIMESTER: ICD-10-CM

## 2020-01-13 LAB
ABO + RH BLD: NORMAL
ABO + RH BLD: NORMAL
BLD GP AB SCN SERPL QL: NORMAL
BLD PROD TYP BPU: NORMAL
BLOOD BANK CMNT PATIENT-IMP: NORMAL
BLOOD BANK CMNT PATIENT-IMP: NORMAL
HGB BLD-MCNC: 10.4 G/DL (ref 11.7–15.7)
NUM BPU REQUESTED: 2
SPECIMEN EXP DATE BLD: NORMAL

## 2020-01-13 PROCEDURE — 25800030 ZZH RX IP 258 OP 636: Performed by: STUDENT IN AN ORGANIZED HEALTH CARE EDUCATION/TRAINING PROGRAM

## 2020-01-13 PROCEDURE — 36415 COLL VENOUS BLD VENIPUNCTURE: CPT | Performed by: STUDENT IN AN ORGANIZED HEALTH CARE EDUCATION/TRAINING PROGRAM

## 2020-01-13 PROCEDURE — 86780 TREPONEMA PALLIDUM: CPT | Performed by: STUDENT IN AN ORGANIZED HEALTH CARE EDUCATION/TRAINING PROGRAM

## 2020-01-13 PROCEDURE — 25000128 H RX IP 250 OP 636: Performed by: STUDENT IN AN ORGANIZED HEALTH CARE EDUCATION/TRAINING PROGRAM

## 2020-01-13 PROCEDURE — 71000012 ZZH RECOVERY PHASE 1 LEVEL 1 FIRST HR: Performed by: OBSTETRICS & GYNECOLOGY

## 2020-01-13 PROCEDURE — 37000009 ZZH ANESTHESIA TECHNICAL FEE, EACH ADDTL 15 MIN: Performed by: OBSTETRICS & GYNECOLOGY

## 2020-01-13 PROCEDURE — 25000125 ZZHC RX 250: Performed by: NURSE ANESTHETIST, CERTIFIED REGISTERED

## 2020-01-13 PROCEDURE — 12000001 ZZH R&B MED SURG/OB UMMC

## 2020-01-13 PROCEDURE — 25800030 ZZH RX IP 258 OP 636: Performed by: NURSE ANESTHETIST, CERTIFIED REGISTERED

## 2020-01-13 PROCEDURE — C1765 ADHESION BARRIER: HCPCS | Performed by: OBSTETRICS & GYNECOLOGY

## 2020-01-13 PROCEDURE — C9290 INJ, BUPIVACAINE LIPOSOME: HCPCS | Performed by: ANESTHESIOLOGY

## 2020-01-13 PROCEDURE — 25000128 H RX IP 250 OP 636: Performed by: ANESTHESIOLOGY

## 2020-01-13 PROCEDURE — 71000013 ZZH RECOVERY PHASE 1 LEVEL 1 EA ADDTL HR: Performed by: OBSTETRICS & GYNECOLOGY

## 2020-01-13 PROCEDURE — 85018 HEMOGLOBIN: CPT | Performed by: STUDENT IN AN ORGANIZED HEALTH CARE EDUCATION/TRAINING PROGRAM

## 2020-01-13 PROCEDURE — 25000132 ZZH RX MED GY IP 250 OP 250 PS 637: Performed by: STUDENT IN AN ORGANIZED HEALTH CARE EDUCATION/TRAINING PROGRAM

## 2020-01-13 PROCEDURE — 40000169 ZZH STATISTIC PRE-PROCEDURE ASSESSMENT I: Performed by: OBSTETRICS & GYNECOLOGY

## 2020-01-13 PROCEDURE — 27210794 ZZH OR GENERAL SUPPLY STERILE: Performed by: OBSTETRICS & GYNECOLOGY

## 2020-01-13 PROCEDURE — 36000059 ZZH SURGERY LEVEL 3 EA 15 ADDTL MIN UMMC: Performed by: OBSTETRICS & GYNECOLOGY

## 2020-01-13 PROCEDURE — 25000128 H RX IP 250 OP 636: Performed by: NURSE ANESTHETIST, CERTIFIED REGISTERED

## 2020-01-13 PROCEDURE — 27110028 ZZH OR GENERAL SUPPLY NON-STERILE: Performed by: OBSTETRICS & GYNECOLOGY

## 2020-01-13 PROCEDURE — 36000057 ZZH SURGERY LEVEL 3 1ST 30 MIN - UMMC: Performed by: OBSTETRICS & GYNECOLOGY

## 2020-01-13 PROCEDURE — 25000132 ZZH RX MED GY IP 250 OP 250 PS 637

## 2020-01-13 PROCEDURE — 37000008 ZZH ANESTHESIA TECHNICAL FEE, 1ST 30 MIN: Performed by: OBSTETRICS & GYNECOLOGY

## 2020-01-13 PROCEDURE — 25800030 ZZH RX IP 258 OP 636

## 2020-01-13 RX ORDER — ONDANSETRON 4 MG/1
4 TABLET, ORALLY DISINTEGRATING ORAL EVERY 30 MIN PRN
Status: DISCONTINUED | OUTPATIENT
Start: 2020-01-13 | End: 2020-01-13 | Stop reason: HOSPADM

## 2020-01-13 RX ORDER — MORPHINE SULFATE 1 MG/ML
INJECTION, SOLUTION EPIDURAL; INTRATHECAL; INTRAVENOUS PRN
Status: DISCONTINUED | OUTPATIENT
Start: 2020-01-13 | End: 2020-01-13

## 2020-01-13 RX ORDER — SODIUM CHLORIDE, SODIUM LACTATE, POTASSIUM CHLORIDE, CALCIUM CHLORIDE 600; 310; 30; 20 MG/100ML; MG/100ML; MG/100ML; MG/100ML
INJECTION, SOLUTION INTRAVENOUS CONTINUOUS
Status: DISCONTINUED | OUTPATIENT
Start: 2020-01-13 | End: 2020-01-13 | Stop reason: HOSPADM

## 2020-01-13 RX ORDER — SODIUM CHLORIDE, SODIUM LACTATE, POTASSIUM CHLORIDE, CALCIUM CHLORIDE 600; 310; 30; 20 MG/100ML; MG/100ML; MG/100ML; MG/100ML
INJECTION, SOLUTION INTRAVENOUS
Status: COMPLETED
Start: 2020-01-13 | End: 2020-01-13

## 2020-01-13 RX ORDER — CITRIC ACID/SODIUM CITRATE 334-500MG
SOLUTION, ORAL ORAL
Status: COMPLETED
Start: 2020-01-13 | End: 2020-01-13

## 2020-01-13 RX ORDER — FENTANYL CITRATE 50 UG/ML
25-50 INJECTION, SOLUTION INTRAMUSCULAR; INTRAVENOUS
Status: DISCONTINUED | OUTPATIENT
Start: 2020-01-13 | End: 2020-01-13 | Stop reason: HOSPADM

## 2020-01-13 RX ORDER — CITRIC ACID/SODIUM CITRATE 334-500MG
30 SOLUTION, ORAL ORAL
Status: COMPLETED | OUTPATIENT
Start: 2020-01-13 | End: 2020-01-13

## 2020-01-13 RX ORDER — IBUPROFEN 800 MG/1
800 TABLET, FILM COATED ORAL EVERY 6 HOURS PRN
Status: DISCONTINUED | OUTPATIENT
Start: 2020-01-13 | End: 2020-01-16 | Stop reason: HOSPADM

## 2020-01-13 RX ORDER — SIMETHICONE 80 MG
80 TABLET,CHEWABLE ORAL 4 TIMES DAILY PRN
Status: DISCONTINUED | OUTPATIENT
Start: 2020-01-13 | End: 2020-01-16 | Stop reason: HOSPADM

## 2020-01-13 RX ORDER — ONDANSETRON 2 MG/ML
4 INJECTION INTRAMUSCULAR; INTRAVENOUS EVERY 6 HOURS PRN
Status: DISCONTINUED | OUTPATIENT
Start: 2020-01-13 | End: 2020-01-16 | Stop reason: HOSPADM

## 2020-01-13 RX ORDER — ACETAMINOPHEN 325 MG/1
650 TABLET ORAL EVERY 4 HOURS PRN
Status: DISCONTINUED | OUTPATIENT
Start: 2020-01-16 | End: 2020-01-16 | Stop reason: HOSPADM

## 2020-01-13 RX ORDER — FENTANYL CITRATE 50 UG/ML
INJECTION, SOLUTION INTRAMUSCULAR; INTRAVENOUS PRN
Status: DISCONTINUED | OUTPATIENT
Start: 2020-01-13 | End: 2020-01-13

## 2020-01-13 RX ORDER — OXYTOCIN 10 [USP'U]/ML
10 INJECTION, SOLUTION INTRAMUSCULAR; INTRAVENOUS
Status: DISCONTINUED | OUTPATIENT
Start: 2020-01-13 | End: 2020-01-16 | Stop reason: HOSPADM

## 2020-01-13 RX ORDER — KETOROLAC TROMETHAMINE 30 MG/ML
30 INJECTION, SOLUTION INTRAMUSCULAR; INTRAVENOUS EVERY 6 HOURS
Status: DISPENSED | OUTPATIENT
Start: 2020-01-13 | End: 2020-01-14

## 2020-01-13 RX ORDER — NALOXONE HYDROCHLORIDE 0.4 MG/ML
.1-.4 INJECTION, SOLUTION INTRAMUSCULAR; INTRAVENOUS; SUBCUTANEOUS
Status: ACTIVE | OUTPATIENT
Start: 2020-01-13 | End: 2020-01-14

## 2020-01-13 RX ORDER — HYDROCORTISONE 2.5 %
CREAM (GRAM) TOPICAL 3 TIMES DAILY PRN
Status: DISCONTINUED | OUTPATIENT
Start: 2020-01-13 | End: 2020-01-16 | Stop reason: HOSPADM

## 2020-01-13 RX ORDER — LIDOCAINE 40 MG/G
CREAM TOPICAL
Status: DISCONTINUED | OUTPATIENT
Start: 2020-01-13 | End: 2020-01-13 | Stop reason: HOSPADM

## 2020-01-13 RX ORDER — CEFAZOLIN SODIUM 1 G/3ML
1 INJECTION, POWDER, FOR SOLUTION INTRAMUSCULAR; INTRAVENOUS SEE ADMIN INSTRUCTIONS
Status: DISCONTINUED | OUTPATIENT
Start: 2020-01-13 | End: 2020-01-13 | Stop reason: HOSPADM

## 2020-01-13 RX ORDER — LIDOCAINE 40 MG/G
CREAM TOPICAL
Status: DISCONTINUED | OUTPATIENT
Start: 2020-01-13 | End: 2020-01-16 | Stop reason: HOSPADM

## 2020-01-13 RX ORDER — ACETAMINOPHEN 325 MG/1
975 TABLET ORAL EVERY 8 HOURS
Status: COMPLETED | OUTPATIENT
Start: 2020-01-13 | End: 2020-01-16

## 2020-01-13 RX ORDER — NALOXONE HYDROCHLORIDE 0.4 MG/ML
.1-.4 INJECTION, SOLUTION INTRAMUSCULAR; INTRAVENOUS; SUBCUTANEOUS
Status: DISCONTINUED | OUTPATIENT
Start: 2020-01-13 | End: 2020-01-13

## 2020-01-13 RX ORDER — PROCHLORPERAZINE 25 MG
25 SUPPOSITORY, RECTAL RECTAL EVERY 12 HOURS PRN
Status: DISCONTINUED | OUTPATIENT
Start: 2020-01-13 | End: 2020-01-16 | Stop reason: HOSPADM

## 2020-01-13 RX ORDER — OXYTOCIN/0.9 % SODIUM CHLORIDE 30/500 ML
340 PLASTIC BAG, INJECTION (ML) INTRAVENOUS CONTINUOUS PRN
Status: DISCONTINUED | OUTPATIENT
Start: 2020-01-13 | End: 2020-01-16 | Stop reason: HOSPADM

## 2020-01-13 RX ORDER — METOCLOPRAMIDE HYDROCHLORIDE 5 MG/ML
10 INJECTION INTRAMUSCULAR; INTRAVENOUS EVERY 6 HOURS PRN
Status: DISCONTINUED | OUTPATIENT
Start: 2020-01-13 | End: 2020-01-16 | Stop reason: HOSPADM

## 2020-01-13 RX ORDER — AMOXICILLIN 250 MG
1 CAPSULE ORAL 2 TIMES DAILY PRN
Status: DISCONTINUED | OUTPATIENT
Start: 2020-01-13 | End: 2020-01-16 | Stop reason: HOSPADM

## 2020-01-13 RX ORDER — ONDANSETRON 2 MG/ML
4 INJECTION INTRAMUSCULAR; INTRAVENOUS EVERY 30 MIN PRN
Status: DISCONTINUED | OUTPATIENT
Start: 2020-01-13 | End: 2020-01-13 | Stop reason: HOSPADM

## 2020-01-13 RX ORDER — PHENYLEPHRINE HCL IN 0.9% NACL 1 MG/10 ML
SYRINGE (ML) INTRAVENOUS CONTINUOUS PRN
Status: DISCONTINUED | OUTPATIENT
Start: 2020-01-13 | End: 2020-01-13

## 2020-01-13 RX ORDER — KETOROLAC TROMETHAMINE 30 MG/ML
INJECTION, SOLUTION INTRAMUSCULAR; INTRAVENOUS PRN
Status: DISCONTINUED | OUTPATIENT
Start: 2020-01-13 | End: 2020-01-13

## 2020-01-13 RX ORDER — AMOXICILLIN 250 MG
2 CAPSULE ORAL 2 TIMES DAILY PRN
Status: DISCONTINUED | OUTPATIENT
Start: 2020-01-13 | End: 2020-01-16 | Stop reason: HOSPADM

## 2020-01-13 RX ORDER — BUPIVACAINE HYDROCHLORIDE 7.5 MG/ML
INJECTION, SOLUTION INTRASPINAL PRN
Status: DISCONTINUED | OUTPATIENT
Start: 2020-01-13 | End: 2020-01-13

## 2020-01-13 RX ORDER — SODIUM CHLORIDE, SODIUM LACTATE, POTASSIUM CHLORIDE, CALCIUM CHLORIDE 600; 310; 30; 20 MG/100ML; MG/100ML; MG/100ML; MG/100ML
INJECTION, SOLUTION INTRAVENOUS CONTINUOUS PRN
Status: DISCONTINUED | OUTPATIENT
Start: 2020-01-13 | End: 2020-01-13

## 2020-01-13 RX ORDER — LANOLIN 100 %
OINTMENT (GRAM) TOPICAL
Status: DISCONTINUED | OUTPATIENT
Start: 2020-01-13 | End: 2020-01-16 | Stop reason: HOSPADM

## 2020-01-13 RX ORDER — OXYCODONE HYDROCHLORIDE 5 MG/1
5-10 TABLET ORAL
Status: DISCONTINUED | OUTPATIENT
Start: 2020-01-13 | End: 2020-01-16 | Stop reason: HOSPADM

## 2020-01-13 RX ORDER — METHYLERGONOVINE MALEATE 0.2 MG/ML
200 INJECTION INTRAVENOUS
Status: DISCONTINUED | OUTPATIENT
Start: 2020-01-13 | End: 2020-01-16 | Stop reason: HOSPADM

## 2020-01-13 RX ORDER — CEFAZOLIN SODIUM 2 G/100ML
2 INJECTION, SOLUTION INTRAVENOUS
Status: COMPLETED | OUTPATIENT
Start: 2020-01-13 | End: 2020-01-13

## 2020-01-13 RX ORDER — BISACODYL 10 MG
10 SUPPOSITORY, RECTAL RECTAL DAILY PRN
Status: DISCONTINUED | OUTPATIENT
Start: 2020-01-15 | End: 2020-01-16 | Stop reason: HOSPADM

## 2020-01-13 RX ORDER — OXYTOCIN/0.9 % SODIUM CHLORIDE 30/500 ML
PLASTIC BAG, INJECTION (ML) INTRAVENOUS CONTINUOUS PRN
Status: DISCONTINUED | OUTPATIENT
Start: 2020-01-13 | End: 2020-01-13

## 2020-01-13 RX ORDER — DEXTROSE, SODIUM CHLORIDE, SODIUM LACTATE, POTASSIUM CHLORIDE, AND CALCIUM CHLORIDE 5; .6; .31; .03; .02 G/100ML; G/100ML; G/100ML; G/100ML; G/100ML
INJECTION, SOLUTION INTRAVENOUS CONTINUOUS
Status: DISCONTINUED | OUTPATIENT
Start: 2020-01-13 | End: 2020-01-16 | Stop reason: HOSPADM

## 2020-01-13 RX ORDER — BUPIVACAINE HYDROCHLORIDE 2.5 MG/ML
INJECTION, SOLUTION EPIDURAL; INFILTRATION; INTRACAUDAL PRN
Status: DISCONTINUED | OUTPATIENT
Start: 2020-01-13 | End: 2020-01-13

## 2020-01-13 RX ORDER — OXYTOCIN/0.9 % SODIUM CHLORIDE 30/500 ML
100 PLASTIC BAG, INJECTION (ML) INTRAVENOUS CONTINUOUS
Status: DISCONTINUED | OUTPATIENT
Start: 2020-01-13 | End: 2020-01-16 | Stop reason: HOSPADM

## 2020-01-13 RX ADMIN — CEFAZOLIN SODIUM 2 G: 2 INJECTION, SOLUTION INTRAVENOUS at 10:45

## 2020-01-13 RX ADMIN — SODIUM CHLORIDE, POTASSIUM CHLORIDE, SODIUM LACTATE AND CALCIUM CHLORIDE: 600; 310; 30; 20 INJECTION, SOLUTION INTRAVENOUS at 12:36

## 2020-01-13 RX ADMIN — Medication 25 MCG/MIN: at 10:51

## 2020-01-13 RX ADMIN — FENTANYL CITRATE 15 MCG: 50 INJECTION, SOLUTION INTRAMUSCULAR; INTRAVENOUS at 10:51

## 2020-01-13 RX ADMIN — SODIUM CHLORIDE, SODIUM LACTATE, POTASSIUM CHLORIDE, CALCIUM CHLORIDE AND DEXTROSE MONOHYDRATE: 5; 600; 310; 30; 20 INJECTION, SOLUTION INTRAVENOUS at 19:31

## 2020-01-13 RX ADMIN — BUPIVACAINE HYDROCHLORIDE IN DEXTROSE 1.6 ML: 7.5 INJECTION, SOLUTION SUBARACHNOID at 10:51

## 2020-01-13 RX ADMIN — ENOXAPARIN SODIUM 90 MG: 100 INJECTION SUBCUTANEOUS at 21:25

## 2020-01-13 RX ADMIN — SODIUM CHLORIDE, POTASSIUM CHLORIDE, SODIUM LACTATE AND CALCIUM CHLORIDE: 600; 310; 30; 20 INJECTION, SOLUTION INTRAVENOUS at 10:39

## 2020-01-13 RX ADMIN — OXYTOCIN-SODIUM CHLORIDE 0.9% IV SOLN 30 UNIT/500ML 300 ML/HR: 30-0.9/5 SOLUTION at 11:16

## 2020-01-13 RX ADMIN — MORPHINE SULFATE 0.1 MG: 1 INJECTION, SOLUTION EPIDURAL; INTRATHECAL; INTRAVENOUS at 10:51

## 2020-01-13 RX ADMIN — SODIUM CITRATE AND CITRIC ACID MONOHYDRATE 30 ML: 500; 334 SOLUTION ORAL at 10:32

## 2020-01-13 RX ADMIN — OXYCODONE HYDROCHLORIDE 5 MG: 5 TABLET ORAL at 21:51

## 2020-01-13 RX ADMIN — Medication 30 ML: at 10:32

## 2020-01-13 RX ADMIN — KETOROLAC TROMETHAMINE 30 MG: 30 INJECTION, SOLUTION INTRAMUSCULAR at 17:57

## 2020-01-13 RX ADMIN — ACETAMINOPHEN 975 MG: 325 TABLET, FILM COATED ORAL at 16:36

## 2020-01-13 RX ADMIN — KETOROLAC TROMETHAMINE 30 MG: 30 INJECTION, SOLUTION INTRAMUSCULAR at 11:55

## 2020-01-13 RX ADMIN — BUPIVACAINE 20 ML: 13.3 INJECTION, SUSPENSION, LIPOSOMAL INFILTRATION at 12:24

## 2020-01-13 RX ADMIN — Medication 100 MCG/MIN: at 10:55

## 2020-01-13 RX ADMIN — SODIUM CHLORIDE, POTASSIUM CHLORIDE, SODIUM LACTATE AND CALCIUM CHLORIDE 1000 ML: 600; 310; 30; 20 INJECTION, SOLUTION INTRAVENOUS at 10:09

## 2020-01-13 RX ADMIN — BUPIVACAINE HYDROCHLORIDE 20 ML: 2.5 INJECTION, SOLUTION EPIDURAL; INFILTRATION; INTRACAUDAL at 12:24

## 2020-01-13 ASSESSMENT — MIFFLIN-ST. JEOR: SCORE: 1882.44

## 2020-01-13 NOTE — ANESTHESIA PREPROCEDURE EVALUATION
Anesthesia Pre-Procedure Evaluation    Patient: Benita Olivo   MRN:     [id number] Gender:   female   Age:    39 year old :      1980        Preoperative Diagnosis: Fetal macrosomia in third trimester, single or unspecified fetus [O36.63X0]  Encounter for supervision of other normal pregnancy in third trimester [Z34.83]   Procedure(s):   SECTION     Past Medical History:   Diagnosis Date     Abnormal Pap smear of cervix     conization Dr. Fernandez.  f/u , paps normal      ASCUS on Pap smear 2012    + hpv,     BMI 32.0-32.9,adult      DVT of upper extremity (deep vein thrombosis) (H)     right arm      Factor V deficiency (H)     homozygous      Mass     URETHRAl mass      PE (pulmonary embolism)     was on birth control at time, hosp x 4-5 d       Past Surgical History:   Procedure Laterality Date     APPENDECTOMY OPEN CHILD        SECTION N/A 2018    Procedure:  SECTION;  Primary  Section;  Surgeon: Junie Hightower MD;  [facility] L[facility]     CONIZATION  2012    Procedure: CONIZATION;  Cold Knife [facility]   Choice anesthesia;  Surgeon: Doreen Fernandez MD;  [facility] OR     CYSTOSCOPY, EXCISE URETHRAL CARUNCLE, COMBINED  2012    Procedure: COMBINED CYSTOSCOPY, EXCISE URETHRAL CARUNCLE;  Cystoscopy and Excision of Urethral Mass;  Surgeon: Rasheeda Salazar MD;  [facility] OR     wisdom teeth            Anesthesia Evaluation     . Pt has had prior anesthetic. Type: Regional    No history of anesthetic complications          ROS/MED HX    ENT/Pulmonary:  - neg pulmonary ROS     Neurologic:       Cardiovascular:         METS/Exercise Tolerance:     Hematologic:     (+) History of blood clots (h/o PE, FVL, on lovenox 100mg BID last Saturday at 2100) pt is anticoagulated, -      Musculoskeletal:         GI/Hepatic:  - neg GI/hepatic ROS       Renal/Genitourinary:  - ROS Renal section negative       Endo:  - neg endo  "ROS       Psychiatric:         Infectious Disease:         Malignancy:         Other:                         PHYSICAL EXAM:   Mental Status/Neuro: A/A/O   Airway: Facies: Feasible  Mallampati: II  Mouth/Opening: Full   Respiratory: Auscultation: CTAB      CV: Rhythm: Regular   Comments:                      LABS:  CBC:   Lab Results   Component Value Date    WBC 14.3 (H) 01/10/2020    WBC 12.1 (H) 10/29/2019    HGB 11.9 01/10/2020    HGB 12.5 10/29/2019    HCT 36.6 01/10/2020    HCT 38.4 10/29/2019     01/10/2020     10/29/2019     BMP:   Lab Results   Component Value Date     10/05/2015     11/28/2005    POTASSIUM 4.0 10/05/2015    POTASSIUM 3.8 11/28/2005    CHLORIDE 109 10/05/2015    CHLORIDE 107 11/28/2005    CO2 22 10/05/2015    CO2 25 11/28/2005    BUN 6 (L) 10/05/2015    BUN 12 11/28/2005    CR 0.66 10/05/2015    CR 0.73 11/28/2005    GLC 81 10/05/2015    GLC 83 11/28/2005     COAGS:   Lab Results   Component Value Date    PTT 34 01/10/2020    INR 1.02 01/10/2020    FIBR 456 (H) 06/04/2016     POC:   Lab Results   Component Value Date    HCG NEG 01/15/2014     OTHER:   Lab Results   Component Value Date    LACT 1.4 06/18/2018    A1C 5.5 07/18/2019    ANA 8.6 10/05/2015    TSH 2.37 01/09/2014        Preop Vitals    BP Readings from Last 3 Encounters:   01/13/20 115/72   01/09/20 105/70   01/03/20 106/72    Pulse Readings from Last 3 Encounters:   01/09/20 86   01/03/20 91   12/26/19 98      Resp Readings from Last 3 Encounters:   11/20/19 15   05/24/19 12   06/21/18 18    SpO2 Readings from Last 3 Encounters:   11/20/19 96%   05/24/19 99%   06/21/18 98%      Temp Readings from Last 1 Encounters:   01/13/20 36.7  C (98  F) (Oral)    Ht Readings from Last 1 Encounters:   01/13/20 1.803 m (5' 11\")      Wt Readings from Last 1 Encounters:   01/13/20 111.1 kg (245 lb)    Estimated body mass index is 34.17 kg/m  as calculated from the following:    Height as of this encounter: 1.803 m (5' " "11\").    Weight as of this encounter: 111.1 kg (245 lb).     LDA:  Peripheral IV 01/13/20 Right Wrist (Active)   Number of days: 0        Assessment:   ASA SCORE: 2    H&P: History and physical reviewed and following examination; no interval change.   Smoking Status:  Non-Smoker/Unknown   NPO Status: NPO Appropriate     Plan:   Anes. Type:  MAC; Spinal; Peripheral Nerve Block; For Post-op pain in coordination with surgeon     Block Details: TAP; Exparel; Single Shot   Pre-Medication: None   Induction:  N/a   Airway: Native Airway   Access/Monitoring: PIV   Maintenance: N/a     Postop Plan:   Postop Pain: Regional  Postop Sedation/Airway: Not planned  Disposition: Inpatient/Admit     PONV Management:   Adult Risk Factors: Female, Non-Smoker   Prevention: Ondansetron     CONSENT: Direct conversation   Plan and risks discussed with: Patient                      Charlie Gamboa MD  "

## 2020-01-13 NOTE — PROGRESS NOTES
Patient arrived to [facility] unit via zoom cart  at 1400,with belongings, accompanied by , Will, with infant in arms. Received report from TANGELA Glass and checked bands. Unit and room orientation started. Call light given; no concerns present at this time. Continue with plan of care.

## 2020-01-13 NOTE — PLAN OF CARE
Pt to BP for scheduled repeat C/S, delayed due to road conditions. [place] to room, plan for intake paperwork, IV start and fluids, bicitra, monitoring, SCDs, staff to see pt, consents, visitor restrictions for siblings, OR instructions for FOB, OR routines, PACU routines. Enc questions. Pt has experienced scheduled c/s with last baby so no questions at this time.

## 2020-01-13 NOTE — ANESTHESIA PROCEDURE NOTES
Spinal/LP Procedure Note    Spinal Block  Staff:     Anesthesiologist:  Charlie Gamboa MD  Location: OB and OR  Procedure Start/Stop Times:     patient identified, IV checked, site marked, risks and benefits discussed, informed consent, monitors and equipment checked, pre-op evaluation, at physician/surgeon's request and post-op pain management      Correct Patient: Yes      Correct Position: Yes      Correct Site: Yes      Correct Procedure: Yes      Correct Laterality:  Yes    Site Marked:  Yes  Procedure:     Procedure:  Intrathecal    ASA:  2    Position:  Sitting    Sterile Prep: chloraprep, mask, sterile gloves and patient draped      Insertion site:  L3-4    Approach:  Midline    Needle Type:  Edie    Needle gauge (G):  25    Local Skin Infiltration:  2% lidocaine    amount (ml):  4    Needle Length (in):  3.5    Introducer used: Yes      Introducer gauge:  20 G    Attempts:  2    Redirects:  2    CSF:  Clear    Paresthesias:  No

## 2020-01-13 NOTE — ANESTHESIA CARE TRANSFER NOTE
Patient: Benita Olivo    Procedure(s):   SECTION    Diagnosis: Fetal macrosomia in third trimester, single or unspecified fetus [O36.63X0]  Encounter for supervision of other normal pregnancy in third trimester [Z34.83]  Diagnosis Additional Information: No value filed.    Anesthesia Type:   MAC, Spinal, Peripheral Nerve Block, For Post-op pain in coordination with surgeon     Note:  Airway :Room Air  Patient transferred to:Labor and Delivery  Handoff Report: Identifed the Patient, Identified the Reponsible Provider, Reviewed the pertinent medical history, Discussed the surgical course, Reviewed Intra-OP anesthesia mangement and issues during anesthesia, Set expectations for post-procedure period and Allowed opportunity for questions and acknowledgement of understanding      Vitals: (Last set prior to Anesthesia Care Transfer)    CRNA VITALS  2020 1134 - 2020 1204      2020             Pulse:  78    SpO2:  93 %                Electronically Signed By: CHAKA Leonard CRNA  2020  12:04 PM

## 2020-01-13 NOTE — ANESTHESIA POSTPROCEDURE EVALUATION
Anesthesia POST Procedure Evaluation    Patient: Benita Olivo   MRN:     [id number] Gender:   female   Age:    39 year old :      1980        Preoperative Diagnosis: Fetal macrosomia in third trimester, single or unspecified fetus [O36.63X0]  Encounter for supervision of other normal pregnancy in third trimester [Z34.83]   Procedure(s):   SECTION   Postop Comments: No value filed.       Anesthesia Type:  Not documented  MAC, Spinal, Peripheral Nerve Block, For Post-op pain in coordination with surgeon    Reportable Event: NO     PAIN: Uncomplicated   Sign Out status: Comfortable, Well controlled pain     PONV: No PONV   Sign Out status:  No Nausea or Vomiting     Neuro/Psych: Uneventful perioperative course   Sign Out Status: Preoperative baseline; Age appropriate mentation     Airway/Resp.: Uneventful perioperative course   Sign Out Status: Non labored breathing, age appropriate RR; Resp. Status within EXPECTED Parameters     CV: Uneventful perioperative course   Sign Out status: Appropriate BP and perfusion indices; Appropriate HR/Rhythm     Disposition:   Sign Out in:  Labor and Delivery  Disposition:  Labor and Delivery  Recovery Course: Uneventful  Follow-Up: Not required           Last Anesthesia Record Vitals:  CRNA VITALS  2020 1123 - 2020 1223      2020             Pulse:  78    SpO2:  93 %          Last PACU Vitals:  Vitals Value Taken Time   /65 2020  3:00 PM   Temp 36.1  C (97  F) 2020  2:00 PM   Pulse 68 2020  1:45 PM   Resp 16 2020  3:00 PM   SpO2 98 % 2020  3:00 PM   Temp src     NIBP     Pulse     SpO2     Resp     Temp     Ht Rate     Temp 2           Electronically Signed By: Charlie Gamboa MD, 2020, 4:04 PM

## 2020-01-13 NOTE — PLAN OF CARE
Pt slightly hypotensive at beginning of PACU stay, back to baseline after 500 fluid bolus, pt denies s/s hypotension. Pain well controlled with toradol and TAP block. Independent with breastfeeding and baby  throughout much of PACU stay. Transfer to  via cart, bands checked and report given to TANGELA Matos who assumed care.

## 2020-01-14 LAB
BLD PROD TYP BPU: NORMAL
BLD PROD TYP BPU: NORMAL
BLD UNIT ID BPU: 0
BLD UNIT ID BPU: 0
BLOOD PRODUCT CODE: NORMAL
BLOOD PRODUCT CODE: NORMAL
BPU ID: NORMAL
BPU ID: NORMAL
HGB BLD-MCNC: 10 G/DL (ref 11.7–15.7)
T PALLIDUM AB SER QL: NONREACTIVE
TRANSFUSION STATUS PATIENT QL: NORMAL

## 2020-01-14 PROCEDURE — 25000128 H RX IP 250 OP 636: Performed by: STUDENT IN AN ORGANIZED HEALTH CARE EDUCATION/TRAINING PROGRAM

## 2020-01-14 PROCEDURE — 36415 COLL VENOUS BLD VENIPUNCTURE: CPT | Performed by: STUDENT IN AN ORGANIZED HEALTH CARE EDUCATION/TRAINING PROGRAM

## 2020-01-14 PROCEDURE — 3E0234Z INTRODUCTION OF SERUM, TOXOID AND VACCINE INTO MUSCLE, PERCUTANEOUS APPROACH: ICD-10-PCS | Performed by: OBSTETRICS & GYNECOLOGY

## 2020-01-14 PROCEDURE — 85018 HEMOGLOBIN: CPT | Performed by: STUDENT IN AN ORGANIZED HEALTH CARE EDUCATION/TRAINING PROGRAM

## 2020-01-14 PROCEDURE — 12000001 ZZH R&B MED SURG/OB UMMC

## 2020-01-14 PROCEDURE — 25800030 ZZH RX IP 258 OP 636: Performed by: STUDENT IN AN ORGANIZED HEALTH CARE EDUCATION/TRAINING PROGRAM

## 2020-01-14 PROCEDURE — 85461 HEMOGLOBIN FETAL: CPT | Performed by: OBSTETRICS & GYNECOLOGY

## 2020-01-14 PROCEDURE — 86901 BLOOD TYPING SEROLOGIC RH(D): CPT | Performed by: OBSTETRICS & GYNECOLOGY

## 2020-01-14 PROCEDURE — 86900 BLOOD TYPING SEROLOGIC ABO: CPT | Performed by: OBSTETRICS & GYNECOLOGY

## 2020-01-14 PROCEDURE — 25000132 ZZH RX MED GY IP 250 OP 250 PS 637: Performed by: STUDENT IN AN ORGANIZED HEALTH CARE EDUCATION/TRAINING PROGRAM

## 2020-01-14 PROCEDURE — 36415 COLL VENOUS BLD VENIPUNCTURE: CPT | Performed by: OBSTETRICS & GYNECOLOGY

## 2020-01-14 RX ORDER — ACETAMINOPHEN 325 MG/1
650 TABLET ORAL EVERY 6 HOURS PRN
Qty: 100 TABLET | Refills: 0 | Status: SHIPPED | OUTPATIENT
Start: 2020-01-14 | End: 2020-05-13

## 2020-01-14 RX ORDER — AMOXICILLIN 250 MG
1 CAPSULE ORAL DAILY
Qty: 100 TABLET | Refills: 0 | Status: SHIPPED | OUTPATIENT
Start: 2020-01-14 | End: 2020-03-10

## 2020-01-14 RX ORDER — IBUPROFEN 600 MG/1
600 TABLET, FILM COATED ORAL EVERY 6 HOURS PRN
Qty: 60 TABLET | Refills: 0 | Status: SHIPPED | OUTPATIENT
Start: 2020-01-14 | End: 2020-03-10

## 2020-01-14 RX ADMIN — IBUPROFEN 800 MG: 800 TABLET, FILM COATED ORAL at 12:14

## 2020-01-14 RX ADMIN — HUMAN RHO(D) IMMUNE GLOBULIN 300 MCG: 300 INJECTION, SOLUTION INTRAMUSCULAR at 21:42

## 2020-01-14 RX ADMIN — ENOXAPARIN SODIUM 100 MG: 100 INJECTION SUBCUTANEOUS at 10:11

## 2020-01-14 RX ADMIN — OXYCODONE HYDROCHLORIDE 5 MG: 5 TABLET ORAL at 03:03

## 2020-01-14 RX ADMIN — ACETAMINOPHEN 975 MG: 325 TABLET, FILM COATED ORAL at 00:31

## 2020-01-14 RX ADMIN — SENNOSIDES AND DOCUSATE SODIUM 1 TABLET: 8.6; 5 TABLET ORAL at 08:44

## 2020-01-14 RX ADMIN — ENOXAPARIN SODIUM 100 MG: 100 INJECTION SUBCUTANEOUS at 21:35

## 2020-01-14 RX ADMIN — SODIUM CHLORIDE, POTASSIUM CHLORIDE, SODIUM LACTATE AND CALCIUM CHLORIDE 500 ML: 600; 310; 30; 20 INJECTION, SOLUTION INTRAVENOUS at 02:44

## 2020-01-14 RX ADMIN — IBUPROFEN 800 MG: 800 TABLET, FILM COATED ORAL at 18:11

## 2020-01-14 RX ADMIN — OXYCODONE HYDROCHLORIDE 5 MG: 5 TABLET ORAL at 06:12

## 2020-01-14 RX ADMIN — KETOROLAC TROMETHAMINE 30 MG: 30 INJECTION, SOLUTION INTRAMUSCULAR at 06:12

## 2020-01-14 RX ADMIN — KETOROLAC TROMETHAMINE 30 MG: 30 INJECTION, SOLUTION INTRAMUSCULAR at 00:03

## 2020-01-14 RX ADMIN — ACETAMINOPHEN 975 MG: 325 TABLET, FILM COATED ORAL at 08:44

## 2020-01-14 RX ADMIN — OXYCODONE HYDROCHLORIDE 5 MG: 5 TABLET ORAL at 08:44

## 2020-01-14 RX ADMIN — ACETAMINOPHEN 975 MG: 325 TABLET, FILM COATED ORAL at 18:11

## 2020-01-14 NOTE — PLAN OF CARE
VSS, but BP slightly low (80-90s/60s). Postpartum check WDL. Pain managed with Toradol, Tylenol, and Oxycodone. Lovenox dose increased to 100mg twice daily; pt administered injection to self. Up ad joana; denies dizziness or lightheadedness. Tolerating regular diet. Voiding without difficulty. Passing gas. Breastfeeding independently. Has blister on R nipple; encouraged pt to apply hydrogel. Also, pumping.  at bedside and supportive. Bonding well with baby. Continue cares.

## 2020-01-14 NOTE — PLAN OF CARE
Pt stable. Bleeding light to scant, fundus firm at UU-U1. BP decreasing but stable, provider aware. Heart rate WNL. Hemoglobin drawn this shift. Pain managed with tylenol and toradol. Drinking encouraged, tolerating regular diet. Breastfeeding with assist. Pumping and collecting 10mL. Also feeding baby 10mL donor milk due to LGA. Encouraged to complete paper work. Bonding well with  and supported by  at bedside. Continue plan of care.

## 2020-01-14 NOTE — PROVIDER NOTIFICATION
PT wondering if it possible to start lovenox at 0900 1/14 rather than this evening at 2100 on 1/13.

## 2020-01-14 NOTE — PROGRESS NOTES
"Post  Anesthesia Follow Up Note    Patient: Benita Olivo    Patient location: Postpartum floor.    Chief complaint: Acute postoperative pain management s/p intrathecal Bupivacaine+ fentanyl + morphine administration     Procedure(s) Performed:  Procedure(s):   SECTION    Anesthesia type: Spinal Block and Bilateral TAP block    Subjective:     Pain Control: 0/10 at rest and 5/10 with ambulation    Additional ROS:  She does not complain of pruritis at this time. She denies weakness, denies paresthesia, denies difficulties breathing or voiding, denies nausea or vomiting. She is not able to ambulate and tolerates regular diet.    Objective:    Respiratory Function (RR / SpO2 / Airway Patency): Satisfactory    Cardiac Function (HR / Rhythm / BP): Satisfactory    Strength and sensation lower extremities: Normal    Site of spinal/epidural insertion: No signs of infection or inflammation.     Last Vitals: BP 95/62   Pulse 71   Temp 36.9  C (98.4  F) (Oral)   Resp 16   Ht 1.803 m (5' 11\")   Wt 111.1 kg (245 lb)   LMP 2019   SpO2 96%   Breastfeeding Unknown   BMI 34.17 kg/m      Assessment and plan:   Benita Olivo is a 39 year old female  POD #1 s/p   SECTION with IT 0.75 % hyperbaric bupivacaine (1.6ml), fentanyl (15mcg) and morphine (0.1mg); and single shot TAP nerve block injections with 20 mL bupivacaine 0.25% followed by 20mL liposome bupivacaine (Exparel) long-acting 1.3% given in the PACU for postoperative analgesia.  Pt is ambulating without difficulty, no weakness or paresthesias.  There is no evidence of adverse side effects associated with spinal and nerve block injections.  The patient is receiving adequate incisional pain control at this time and anticipate up to 72 hours of incisional pain control.  However, we further anticipate that the patient will require opioid/nonopioid analgesics for visceral and muscle pain that is not controlled with local anesthetic. "      In brief summary, her post-operative analgesia is adequate today. Further interventional analgesic strategies would be of little utility at this time. Thus, we recommend proceeding with PO analgesics including staggered dosing of NSAIDs (ibuprofen) and acetaminophen, with a taper of oxycodone.     Thank you for including us in the care for this patient.    Yuliana Soto MD

## 2020-01-14 NOTE — PROGRESS NOTES
Post Partum Progress Note  PPD#1    Subjective:  She is resting comfortably in bed this morning. She complains of soreness. Pain is improving and well controlled on current medication regimen. She is tolerating PO intake. Lochia present and moderate.  She is voiding without difficulty. She has passed flatus, has not had a BM. Colmenares just removed, has not yet voided. She is ambulating without dizziness or difficulty.  She denies headache, changes in vision, nausea/vomiting, chest pain, shortness of breath, RUQ pain, or worsening edema.  Plans to breast feed.    Objective:  Vitals:    20 0225 20 0310 20 0408 20 0611   BP: (!) 75/40 90/54 93/54 97/55   Pulse: 71   71   Resp: 16 16  18   Temp: 98  F (36.7  C) 98  F (36.7  C)  97.8  F (36.6  C)   TempSrc: Oral Oral  Oral   SpO2: 94% 96% 96% 99%   Weight:       Height:           General: NAD, resting comfortably  CV: Regular rate, well perfused.   Pulm: Normal respiratory effort.  Abd: Soft, non-tender, non-distended. Fundus is firm and at the umbilicus.    Incision: incision is clean, dry, intact with bandage in place  Ext: trace lower extremity edema bilaterally. No calf tenderness. No asymmetry.     Assessment/Plan:  Benita Olivo is a 39 year old  female who is POD#1 scheduled RLTCS. Stable in the postop period.     - Encourage routine post-operative goals including ambulation and incentive spirometry  - PNC: Rh neg. Rhogam eval pending. Rubella immne. No intervention indicated.  - Pain: controlled on oral medications  - Heme: Hgb 10.4> mL>AM Hgb pending.  If <10 will discharge home with iron.  - GI: continue anti-emetics and stool softeners as needed.  - : Colmenares in place, remove this AM  - Infant: Stable in rom  - Feeding: Plans on breastfeeding.  - BC: Plans on natural family planning, as she had understood that no hormonal method was acceptable to use. Discussed that progesterone-only methods including Nexplanon or LNG-IUD  are generally NOT contraindicated in pts with FVL or h/o DVT/PE. She will consider options.   PPx: Continue daily Lovenox 90 mg BID due to history of VTE and Factor V Leiden.    Discharge to home on POD#2-3      Lori Obando MD  OB/GYN PGY-2    Appreciate Dr. Obando's note above, patient also seen and examined by me separately from the resident. I agree with the note and plan of care above.   Maria Luisa Manriquez MD

## 2020-01-14 NOTE — PLAN OF CARE
Postpartum stable. Tolerating diet. Colmenares is patent and draining adequate amounts, encouraged to drink more. PIV infusing D5 LR. Incision is c/d/I. Ambulated in room. Pain is tolerable with toradol, tylenol and oxy. Abd binder in place. Spouse in room for support. Continue plan of care.

## 2020-01-14 NOTE — PLAN OF CARE
VSS at this time. Post partum assessment WDL. Pt. Is taking toradol, oxycodone, and tylenol for pain relief. Pt b/p runs lower but had a pressures of 75/40- provider ordered a bolus of 500 LR. Blood pressures have been stable since. Colmenares was removed at 0640 and has been up and walking around. History of DVT/PE- lovenox is scheduled. Will continue to monitor patient at this time.

## 2020-01-14 NOTE — PROVIDER NOTIFICATION
Per pt, her hematologist said she is to take 100mg Lovenox twice per day until 6 weeks postpartum. Current order is 90 mg twice per day. Please clarify. Thank you.

## 2020-01-15 LAB
ABO + RH BLD: NORMAL
ABO + RH BLD: NORMAL
BLOOD BANK CMNT PATIENT-IMP: NORMAL
DATE RH IMM GL GVN: NORMAL
FETAL CELL SCN BLD QL ROSETTE: NORMAL
LMWH PPP CHRO-ACNC: 0.9 IU/ML
RH IG VIALS RECOM PATIENT: NORMAL

## 2020-01-15 PROCEDURE — 25000132 ZZH RX MED GY IP 250 OP 250 PS 637: Performed by: STUDENT IN AN ORGANIZED HEALTH CARE EDUCATION/TRAINING PROGRAM

## 2020-01-15 PROCEDURE — 36415 COLL VENOUS BLD VENIPUNCTURE: CPT | Performed by: STUDENT IN AN ORGANIZED HEALTH CARE EDUCATION/TRAINING PROGRAM

## 2020-01-15 PROCEDURE — 12000001 ZZH R&B MED SURG/OB UMMC

## 2020-01-15 PROCEDURE — 25000128 H RX IP 250 OP 636: Performed by: STUDENT IN AN ORGANIZED HEALTH CARE EDUCATION/TRAINING PROGRAM

## 2020-01-15 PROCEDURE — 85520 HEPARIN ASSAY: CPT | Performed by: STUDENT IN AN ORGANIZED HEALTH CARE EDUCATION/TRAINING PROGRAM

## 2020-01-15 RX ADMIN — ENOXAPARIN SODIUM 100 MG: 100 INJECTION SUBCUTANEOUS at 10:40

## 2020-01-15 RX ADMIN — OXYCODONE HYDROCHLORIDE 5 MG: 5 TABLET ORAL at 10:40

## 2020-01-15 RX ADMIN — ENOXAPARIN SODIUM 100 MG: 100 INJECTION SUBCUTANEOUS at 22:30

## 2020-01-15 RX ADMIN — IBUPROFEN 800 MG: 800 TABLET, FILM COATED ORAL at 21:20

## 2020-01-15 RX ADMIN — IBUPROFEN 800 MG: 800 TABLET, FILM COATED ORAL at 09:05

## 2020-01-15 RX ADMIN — IBUPROFEN 800 MG: 800 TABLET, FILM COATED ORAL at 14:30

## 2020-01-15 RX ADMIN — SIMETHICONE CHEW TAB 80 MG 80 MG: 80 TABLET ORAL at 18:35

## 2020-01-15 RX ADMIN — ACETAMINOPHEN 975 MG: 325 TABLET, FILM COATED ORAL at 10:40

## 2020-01-15 RX ADMIN — OXYCODONE HYDROCHLORIDE 5 MG: 5 TABLET ORAL at 06:44

## 2020-01-15 RX ADMIN — IBUPROFEN 800 MG: 800 TABLET, FILM COATED ORAL at 02:48

## 2020-01-15 RX ADMIN — ACETAMINOPHEN 975 MG: 325 TABLET, FILM COATED ORAL at 18:35

## 2020-01-15 RX ADMIN — ACETAMINOPHEN 975 MG: 325 TABLET, FILM COATED ORAL at 02:48

## 2020-01-15 RX ADMIN — SENNOSIDES AND DOCUSATE SODIUM 2 TABLET: 8.6; 5 TABLET ORAL at 09:05

## 2020-01-15 NOTE — PLAN OF CARE
VSS. Postpartum assessment  WNL- see flow sheet.  Scant  lochia rubra. Mild dependent edema present. Incision dressing is clean, dry and intact. Fundus firm, midline at U1.  Pain managed on tylenol and ibuprofen. Patient voiding and ambulating independently. Patient breastfeeding infant and supplementing with donor milk and pumped breastmilk via fingerfeeding or SNS. Patient nipples are sore, cracked and blistered. Lanolin and hydrogel offered and encouraged to use them. Patient and  bonding with baby well. Will continue to monitor and provide support as needed.

## 2020-01-15 NOTE — LACTATION NOTE
This note was copied from a baby's chart.  Consult for: Nipple pain and damage    Delivery Information: Baby born at 39.1 weeks via repeat  on 20 at 1115.    Maternal Health History:  Benita has a history of obesity, AMA (39 years old), vitamin d deficiency, factor V and breastfeeding/milk supply challenges.    Benita shares that both of her other children were diagnosed with tongue tie and had milk transfer issues and she had nipple damage and low milk supply. [facility]    Maternal Breast Exam:  Benita noted breast growth in pregnancy. Her breasts are soft and symmetrical with bilateral intact, cracked nipples. She has been using hydrogel pads. Cracks do not appear infected and patient feels they are not improving, but not getting worse. ?    Infant information: Infant has age appropriate output and his bilirubin is LIR at 29 hours of age. He was LGA at birth at 10lb 8oz. At 24 hours of age his weight loss was -4.3% of his birthweight at 10lb 0.8 oz. He has been supplemented with donor milk. His initial blood sugars were stable and assessment is complete.     Recent Labs   Lab 20  1021 20  0704 20  0416 20  0218 20  0012 20  1314   GLC  --   --   --   --   --   --   --  45   BGM 55 48 55 44 41 48   < >  --     < > = values in this interval not displayed.         Oral exam of baby:  Infant with wider arched palate and palpable, thickened lingual frenulum. He is able to extend his tongue over the lower gum ridge when sucking, but movement at the back of the tongue feels restricted; he feels as if he's breaking suction at the back of his tongue when sucking.     Feeding Assessment:  Infant latched in the cross cradle position on the left breast when I entered the room. He appeared to have a very shallow latch and was positioned away from mother.   Infant was unlatched and oral assessment completed.  I offered to show mother the laid back and/or football position to see if infant could get a more comfortable latch, but she prefers the cross cradle position. Infant was repositioned, tummy to tummy and close to mother, and re-latched achieving a deeper, more asymmetric latch. Mother was able to repeat demo and achieve a deeper latch. She was encouraged to keep infant close to her and not allow him to slide back on the nipple during feeding. Benita felt some initial sharp pain that minimized after a few sucks. Despite positioning changes and re-latching, she wasn't able to get a completely comfortable latch but felt the latch was more comfortable than it had been and tolerable. Infant able to sustain latch but only infrequent swallows heard.    Benita has been hand expressing and pumping to help bring in her milk supply.     Education: early feeding cues, benefits of feeding on cue, breastfeeding positions, signs breastfeeding is going well (comfortable latch, age appropriate output and weight loss, swallowing heard at the breast), satiety cues, expected  output,  weight loss, nutritive vs non-nutritive sucking, benefits of skin to skin, the Second Night, role of the tongue in breastfeeding, signs/symptoms of nipple/breast infection and when to notify provider, benefits of breast massage and hand expression of colostrum, benefits/recommendations when pumping due to supplementation, donor milk resources, inpatient lactation support and outpatient lactation resources.        Plan: Continue breastfeeding on cue with RN support as needed with a goal of 8-12 feedings per day. Encourage frequent skin to skin. Continue hand expression/pumping after feedings due to LGA baby, supplementation and history of low milk supply.    Benita has a pump to use at home but may check with insurance about coverage for a new pump. She was encouraged to check coverage of a rental [employer] pump as well.     [facility]

## 2020-01-15 NOTE — PROGRESS NOTES
Post Partum Progress Note  PPD#2    Subjective:  She is resting comfortably in bed this morning. She complains of soreness that is worse with movement. Pain does improve with pain medications. She is tolerating PO intake. Lochia present and moderate.  She is voiding without difficulty. She has passed flatus, has not had a BM. Voiding spontaneously. She is ambulating without dizziness or difficulty.  She denies headache, changes in vision, nausea/vomiting, chest pain, shortness of breath, RUQ pain, or worsening edema.  Plans to breast feed.    Objective:  Vitals:    20 1012 20 1123 20 1816 01/15/20 0313   BP: (!) 85/61 95/62 105/66 99/62   Pulse:   81    Resp: 16  16 16   Temp: 98.4  F (36.9  C)  98  F (36.7  C) 98.4  F (36.9  C)   TempSrc: Oral  Oral Oral   SpO2: 96%      Weight:       Height:           General: NAD, resting comfortably  CV: Regular rate, well perfused.   Pulm: Normal respiratory effort.  Abd: Soft, non-tender, non-distended. Fundus is firm and at the umbilicus.    Incision: incision is clean, dry, intact with bandage in place  Ext: trace lower extremity edema bilaterally. No calf tenderness. No asymmetry.     Assessment/Plan:  Benita Olivo is a 39 year old  female who is POD#2 scheduled RLTCS. Stable in the postop period.     - Encourage routine post-operative goals including ambulation and incentive spirometry  - PNC: Rh neg. Rhogam eval pending. Rubella immne. No intervention indicated.  - Pain: controlled on oral medications  - Heme: Hgb 10.4> mL>10.0. Asymptomatic.   - GI: continue anti-emetics and stool softeners as needed.  - : s/p horton, no issues  - Infant: Stable in rom  - Feeding: Plans on breastfeeding.  - BC: Plans on natural family planning, as she had understood that no hormonal method was acceptable to use. Discussed that progesterone-only methods including Nexplanon or LNG-IUD are generally NOT contraindicated in pts with FVL or h/o DVT/PE. She  will consider options.   PPx: Continue daily Lovenox 100 mg BID due to history of VTE and Factor V Leiden.    Discharge to home on POD#3    Maritza Araiza MD  Ob/Gyn PGY-3

## 2020-01-15 NOTE — PLAN OF CARE
Data: Vital signs within normal limits. Postpartum checks within normal limits - see flow record. Patient eating and drinking normally. Patient able to empty bladder independently and is up ambulating. Encouraged to shower and remove dressing, patient agrees to this afternoon. Patient performing self cares and is able to care for infant.  Action: Patient medicated during the shift for incisional soreness.   Response: Positive attachment behaviors observed with infant.  present   Will continue to monitor and provide support.

## 2020-01-15 NOTE — PLAN OF CARE
VSS and postpartum assessments WDL.  Up ad joana with steady gait.  Independent with cares.  Bonding well with infant.  Feeding baby on cue at the breast with SNS and donor milk, and pumping. Pumping 2-3mL at at time. Baby has been getting 15-20mL of donor milk per feeding. Nipples are sore and bleeding; patient was given hydrogel pads to expedite healing.  Pain managed with tylenol and ibuprofen.  PIV removed per pt request, and because iv catheter was coming out.   Will is present and very supportive.  Will continue with postpartum cares and education per plan of care.

## 2020-01-16 ENCOUNTER — ANTICOAGULATION THERAPY VISIT (OUTPATIENT)
Dept: ANTICOAGULATION | Facility: CLINIC | Age: 40
End: 2020-01-16

## 2020-01-16 ENCOUNTER — APPOINTMENT (OUTPATIENT)
Dept: ULTRASOUND IMAGING | Facility: CLINIC | Age: 40
End: 2020-01-16
Payer: COMMERCIAL

## 2020-01-16 VITALS
SYSTOLIC BLOOD PRESSURE: 104 MMHG | OXYGEN SATURATION: 96 % | DIASTOLIC BLOOD PRESSURE: 71 MMHG | RESPIRATION RATE: 16 BRPM | HEIGHT: 71 IN | BODY MASS INDEX: 34.3 KG/M2 | HEART RATE: 73 BPM | TEMPERATURE: 97.9 F | WEIGHT: 245 LBS

## 2020-01-16 DIAGNOSIS — I82.409 DEEP VEIN THROMBOSIS (DVT) (H): ICD-10-CM

## 2020-01-16 DIAGNOSIS — D68.51 HOMOZYGOUS FACTOR V LEIDEN MUTATION (H): ICD-10-CM

## 2020-01-16 DIAGNOSIS — Z79.01 LONG TERM CURRENT USE OF ANTICOAGULANT THERAPY: ICD-10-CM

## 2020-01-16 PROCEDURE — 25000128 H RX IP 250 OP 636: Performed by: STUDENT IN AN ORGANIZED HEALTH CARE EDUCATION/TRAINING PROGRAM

## 2020-01-16 PROCEDURE — 25000132 ZZH RX MED GY IP 250 OP 250 PS 637: Performed by: STUDENT IN AN ORGANIZED HEALTH CARE EDUCATION/TRAINING PROGRAM

## 2020-01-16 PROCEDURE — 93971 EXTREMITY STUDY: CPT | Mod: RT

## 2020-01-16 RX ORDER — OXYCODONE HYDROCHLORIDE 5 MG/1
5 TABLET ORAL EVERY 4 HOURS PRN
Qty: 8 TABLET | Refills: 0 | Status: SHIPPED | OUTPATIENT
Start: 2020-01-16 | End: 2020-03-10

## 2020-01-16 RX ORDER — MUPIROCIN 20 MG/G
OINTMENT TOPICAL 3 TIMES DAILY
Qty: 30 G | Refills: 0 | Status: SHIPPED | OUTPATIENT
Start: 2020-01-16 | End: 2020-03-10

## 2020-01-16 RX ADMIN — ENOXAPARIN SODIUM 100 MG: 100 INJECTION SUBCUTANEOUS at 10:38

## 2020-01-16 RX ADMIN — IBUPROFEN 800 MG: 800 TABLET, FILM COATED ORAL at 10:38

## 2020-01-16 RX ADMIN — ACETAMINOPHEN 975 MG: 325 TABLET, FILM COATED ORAL at 10:38

## 2020-01-16 RX ADMIN — IBUPROFEN 800 MG: 800 TABLET, FILM COATED ORAL at 03:39

## 2020-01-16 RX ADMIN — ACETAMINOPHEN 975 MG: 325 TABLET, FILM COATED ORAL at 03:39

## 2020-01-16 RX ADMIN — SENNOSIDES AND DOCUSATE SODIUM 2 TABLET: 8.6; 5 TABLET ORAL at 10:37

## 2020-01-16 NOTE — PROGRESS NOTES
ANTICOAGULATION  MANAGEMENT: Discharge Review    Benita Olivo chart reviewed for anticoagulation continuity of care    Hospital Admission on 1/13to 1/16 for Maternity Care after Caesarian Section .    Discharge disposition: Home    INR Results:    Recent Labs   Lab Test 01/10/20  1515 05/16/19  1759 04/19/19  1555   INR 1.02 1.40* 1.90*       Warfarin inpatient management: Pt currently holding her Warfarin and continuing Lovenox 90mg Q 12 Hours    Warfarin discharge instructions: Currently Holding Warfarin and continue Lovenox 90mg Q 12 Hours      Medication Changes Affecting Anticoagulation: No    Additional Factors Affecting Anticoagulation: Yes: Caesarian Section    Plan     No adjustment to anticoagulation plan needed    Patient not contacted    Anticoagulation Calendar updated     Previous pt had a baby on 6/21/18 and pt contacted the Coumadin clinic on 1/3/19 when she felt ready to restart Warfarin. Pool message sent to follow-up in March to see when pt will restart Warfarin    Noemi Morgan RN

## 2020-01-16 NOTE — PLAN OF CARE
VSS at this time. Post partum assessment WDL. Pt. Independent in room and bonding well with infant. At this time patient just needs help with latching infant better. Pt. Has very sore and cracked nipples- using lanolin cream and hydrogels. Pt. Is also pumping. Taking ibuprofen and tylenol for pain relief and has comfortable management. Will continue to monitor pt. At this time.

## 2020-01-16 NOTE — PROGRESS NOTES
Post Partum Progress Note  PPD#3    Subjective:  She is resting comfortably in bed this morning. Pain is improving and is well controlled. She is tolerating PO intake. Lochia present and moderate.  She is voiding without difficulty. She has passed flatus and had a BM yesterday. Voiding spontaneously. She is ambulating without dizziness or difficulty.  She denies headache, changes in vision, nausea/vomiting, chest pain, shortness of breath, RUQ pain, or worsening edema.  Plans to breast feed.    Objective:  Vitals:    01/15/20 0815 01/15/20 1650 01/15/20 2253 20 0047   BP: 99/67 105/62  102/56   Pulse:  73     Resp:  16  16   Temp: 98.2  F (36.8  C) 98  F (36.7  C)  97.8  F (36.6  C)   TempSrc: Oral Oral  Oral   SpO2: 99%  97%    Weight:       Height:           General: NAD, resting comfortably  CV: Regular rate, well perfused.   Pulm: Normal respiratory effort.  Abd: Soft, non-tender, non-distended. Fundus is firm and at the umbilicus.    Incision: incision is clean, dry, intact  Ext: trace lower extremity edema bilaterally. No calf tenderness. No asymmetry.     Assessment/Plan:  Benita Olivo is a 39 year old  female who is POD#3 scheduled RLTCS. Stable in the postop period.     - Encourage routine post-operative goals including ambulation and incentive spirometry  - PNC: Rh neg. Rhogam eval pending. Rubella immne. No intervention indicated.  - Pain: controlled on oral medications  - Heme: Hgb 10.4> mL>10.0. Asymptomatic.   - GI: continue anti-emetics and stool softeners as needed.  - : s/p horton, no issues  - Infant: Stable in room  - Feeding: Plans on breastfeeding.  - BC: Plans on natural family planning.   PPx: Continue daily Lovenox 100 mg BID due to history of VTE and Factor V Leiden. Therapeutic XA level yesterday.     Discharge to home on POD#3    Maritza Araiza MD  Ob/Gyn PGY-3    Appreciate note by Dr. Araiza. Patient has been seen and examined by me separate from the resident,  agree with above note.     Doreen Fernandez MD  10:45 AM

## 2020-01-16 NOTE — PROVIDER NOTIFICATION
01/15/20 2898   Provider Notification   Provider Name/Title Dr. Pineda (G2)   Method of Notification Electronic Page   Request Evaluate-Remote   Notification Reason Status Update   Patient feels that her right leg is more swollen than left leg. Right ankle is +3 edema while left is +2. Pt denies pain on R lower extremity, SpO2 on R leg is 97%. Any further orders? Thank you.

## 2020-01-16 NOTE — PROVIDER NOTIFICATION
01/15/20 2053   Provider Notification   Provider Name/Title Dr. Pineda (G2)   Method of Notification Electronic Page   Request Evaluate-Remote   Notification Reason Lab Results;Other   Patient had a Hep10A level checked this afternoon which was 0.90. Does this need to be rechecked? Is there an algorithm with Hep10A? Thank you.

## 2020-01-16 NOTE — PLAN OF CARE
Data: Vital signs within normal limits. Postpartum checks within normal limits - see flow record. Patient reporting more swelling on right lower extremity, see previous provider notification. Provider ordered doppler of right lower extremity. Patient eating and drinking normally. Patient able to empty bladder independently and is up ambulating. No apparent signs of infection. Incision healing well. Patient performing self cares and is able to care for infant. Patient breastfeeding, latch checked, using hydrogels for sore nipples. Breast pumping independently, pumping between 30-40mL of breast milk.   Action: Pain has been adequately managed with oral medications. Patient wearing abdominal binder for additional comfort and support.   Response: Positive attachment behaviors observed with infant.  was at bedside earlier in shift but has gone home for the evening.   Plan: Continue with the plan of care.

## 2020-01-16 NOTE — PROVIDER NOTIFICATION
01/16/20 0029   Provider Notification   Provider Name/Title Dr. Bear (G2)   Method of Notification Electronic Page   Request Evaluate-Remote   Notification Reason Status Update   Doppler ultrasound ordered is just routine not stat. If routine it will not be read till the AM if we change it to stat it will be read tonight. What would you like to do?    Provider replied- it is okay to stay routine. Patient on anticoagulants.     Thanks, Isabel

## 2020-01-28 ENCOUNTER — DOCUMENTATION ONLY (OUTPATIENT)
Dept: CARE COORDINATION | Facility: CLINIC | Age: 40
End: 2020-01-28

## 2020-01-28 NOTE — PROGRESS NOTES
[facility] will be sharing updates with you on Maternal Child Health Referral requests for home care services.  This is for care coordination purposes and alert you to referral status.  We received the referral for  Benita Olivo; MRN [id number] and want to update you:      [facility] has made two attempts to contact patient by phone and text message over the last 4 days.   We have not had any response from patient.  Final message was left advising patient to follow up with Primary Care Providers for mom and baby.  Ordering MD and Primary Care Providers for mom and baby notified.     Sincerely [facility]  Alem Morris  [phone]

## 2020-02-03 ENCOUNTER — MEDICAL CORRESPONDENCE (OUTPATIENT)
Dept: HEALTH INFORMATION MANAGEMENT | Facility: CLINIC | Age: 40
End: 2020-02-03

## 2020-02-16 NOTE — MR AVS SNAPSHOT
Benita POSEY Geovani   9/28/2017   Anticoagulation Therapy Visit    Description:  37 year old female   Provider:  Noemi Morgan, RN   Department:  [facility]           INR as of 9/28/2017     Today's INR 2.50      Anticoagulation Summary as of 9/28/2017     INR goal 2.0-3.0   Today's INR 2.50   Full instructions 12.5 mg on Mon, Fri; 10 mg all other days   Next INR check 10/26/2017    Indications   Deep vein thrombosis (DVT) (H) [I82.409] [I82.409]  Long-term (current) use of anticoagulants [Z79.01] [Z79.01]  Homozygous Factor V Leiden mutation (H) (Resolved) [D68.51]         September 2017 Details    Sun Mon Tue Wed Thu Fri Sat          1               2                 3               4               5               6               7               8               9                 10               11               12               13               14               15               16                 17               18               19               20               21               22               23                 24               25               26               27               28      10 mg   See details      29      12.5 mg         30      10 mg          Date Details   09/28 This INR check               How to take your warfarin dose     To take:  10 mg Take 2 of the 5 mg tablets.    To take:  12.5 mg Take 2.5 of the 5 mg tablets.           October 2017 Details    Sun Mon Tue Wed Thu Fri Sat     1      10 mg         2      12.5 mg         3      10 mg         4      10 mg         5      10 mg         6      12.5 mg         7      10 mg           8      10 mg         9      12.5 mg         10      10 mg         11      10 mg         12      10 mg         13      12.5 mg         14      10 mg           15      10 mg         16      12.5 mg         17      10 mg         18      10 mg         19      10 mg         20      12.5 mg         21      10 mg           22      10 mg         23      12.5 mg          24      10 mg         25      10 mg         26            27               28                 29               30               31                    Date Details   No additional details    Date of next INR:  10/26/2017         How to take your warfarin dose     To take:  10 mg Take 2 of the 5 mg tablets.    To take:  12.5 mg Take 2.5 of the 5 mg tablets.            Patient: SENTHIL SAVAGE [id number] 63y Male                           Internal Medicine Hospitalist Progress Note    Interval History:  No complaints.  SOB improving.  Off O2 at rest.  Reports some dyspnea with ambulation.      ____________________PHYSICAL EXAM:  GENERAL:  NAD Alert and Oriented x 3   HEENT: NCAT  CARDIOVASCULAR:  S1, S2  LUNGS: coarse BS b/l  ABDOMEN:  soft, (-) tenderness, (-) distension, (+) bowel sounds, (-) guarding, (-) rebound (-) rigidity  EXTREMITIES:  no cyanosis / clubbing / edema.   ____________________    VITALS:  Vital Signs Last 24 Hrs  T(C): 36.6 (16 Feb 2020 08:48), Max: 36.6 (16 Feb 2020 08:48)  T(F): 97.8 (16 Feb 2020 08:48), Max: 97.8 (16 Feb 2020 08:48)  HR: 74 (16 Feb 2020 09:11) (70 - 111)  BP: 126/71 (16 Feb 2020 08:48) (121/68 - 126/71)  BP(mean): --  RR: 18 (16 Feb 2020 08:48) (18 - 22)  SpO2: 98% (16 Feb 2020 08:48) (94% - 99%) Daily     Daily   CAPILLARY BLOOD GLUCOSE        I&O's Summary      LABS:                        9.8    3.04  )-----------( 361      ( 16 Feb 2020 09:57 )             31.3     02-16    136  |  99  |  11.0  ----------------------------<  101<H>  3.6   |  25.0  |  0.39<L>    Ca    8.6      16 Feb 2020 09:57                    MEDICATIONS:  acetaminophen   Tablet .. 650 milliGRAM(s) Oral every 6 hours PRN  albuterol/ipratropium for Nebulization 3 milliLiter(s) Nebulizer every 6 hours  ALPRAZolam 0.25 milliGRAM(s) Oral at bedtime  ceFAZolin   IVPB 2000 milliGRAM(s) IV Intermittent every 8 hours  enoxaparin Injectable 40 milliGRAM(s) SubCutaneous daily  finasteride 5 milliGRAM(s) Oral daily  lactulose Syrup 10 Gram(s) Oral two times a day PRN  lidocaine   Patch 1 Patch Transdermal daily  lidocaine   Patch 1 Patch Transdermal daily  melatonin 3 milliGRAM(s) Oral at bedtime  oxyCODONE    IR 10 milliGRAM(s) Oral every 6 hours  oxyCODONE    IR 10 milliGRAM(s) Oral every 4 hours PRN  polyethylene glycol 3350 17 Gram(s) Oral at bedtime  predniSONE   Tablet 5 milliGRAM(s) Oral two times a day  tamsulosin 0.4 milliGRAM(s) Oral daily Patient: SENTHIL SAVAGE [id number] 63y Male                           Internal Medicine Hospitalist Progress Note    Interval History:  No complaints.  SOB improving.  Off O2 at rest.  With ambulation SaO2 92-96% on RA. 	    ____________________PHYSICAL EXAM:  GENERAL:  NAD Alert and Oriented x 3   HEENT: NCAT  CARDIOVASCULAR:  S1, S2  LUNGS: coarse BS b/l  ABDOMEN:  soft, (-) tenderness, (-) distension, (+) bowel sounds, (-) guarding, (-) rebound (-) rigidity  EXTREMITIES:  no cyanosis / clubbing / edema.   ____________________    VITALS:  Vital Signs Last 24 Hrs  T(C): 36.6 (16 Feb 2020 08:48), Max: 36.6 (16 Feb 2020 08:48)  T(F): 97.8 (16 Feb 2020 08:48), Max: 97.8 (16 Feb 2020 08:48)  HR: 74 (16 Feb 2020 09:11) (70 - 111)  BP: 126/71 (16 Feb 2020 08:48) (121/68 - 126/71)  BP(mean): --  RR: 18 (16 Feb 2020 08:48) (18 - 22)  SpO2: 98% (16 Feb 2020 08:48) (94% - 99%) Daily     Daily   CAPILLARY BLOOD GLUCOSE        I&O's Summary      LABS:                        9.8    3.04  )-----------( 361      ( 16 Feb 2020 09:57 )             31.3     02-16    136  |  99  |  11.0  ----------------------------<  101<H>  3.6   |  25.0  |  0.39<L>    Ca    8.6      16 Feb 2020 09:57                    MEDICATIONS:  acetaminophen   Tablet .. 650 milliGRAM(s) Oral every 6 hours PRN  albuterol/ipratropium for Nebulization 3 milliLiter(s) Nebulizer every 6 hours  ALPRAZolam 0.25 milliGRAM(s) Oral at bedtime  ceFAZolin   IVPB 2000 milliGRAM(s) IV Intermittent every 8 hours  enoxaparin Injectable 40 milliGRAM(s) SubCutaneous daily  finasteride 5 milliGRAM(s) Oral daily  lactulose Syrup 10 Gram(s) Oral two times a day PRN  lidocaine   Patch 1 Patch Transdermal daily  lidocaine   Patch 1 Patch Transdermal daily  melatonin 3 milliGRAM(s) Oral at bedtime  oxyCODONE    IR 10 milliGRAM(s) Oral every 6 hours  oxyCODONE    IR 10 milliGRAM(s) Oral every 4 hours PRN  polyethylene glycol 3350 17 Gram(s) Oral at bedtime  predniSONE   Tablet 5 milliGRAM(s) Oral two times a day  tamsulosin 0.4 milliGRAM(s) Oral daily

## 2020-02-20 NOTE — PROGRESS NOTES
Addendum 2/20/20    I left a message for Benita to call the [facility].  Need to find out if she restarted her warfarin or is still on Lovenox. Or is someone else managing her Anticoagulation Therapy.  Also sent staff message to Ann Torres RN to see if he has any contact with patient.    Cali Wade [facility]

## 2020-03-02 NOTE — PROGRESS NOTES
Addendum 3/2/20    I spoke with Benita today. She said she plans to stay on lovenox for another month and will call us when she is going to restart the warfarin.    Cali

## 2020-03-06 NOTE — PROGRESS NOTES
[facility]  Postpartum Visit    SUBJECTIVE    Benita Olivo is a 39 year old  who is 6 weeks after a RLTCS on 20. She overall feels well today. Her vaginal bleeding stopped 5 weeks after delivery and she has not experienced any sense. She denies any abdominal pain, bowel concerns, or urinary concerns. She is breastfeeding. Her mood is positive. She is currently taking lovenox for DVT prophylaxis as she is FVL heterozygous; she hasn't yet made plans with the hematology team to transition to warfarin. Reports new mole, requests Dermatology referral for evaluation/removal.    OB History    Para Term  AB Living   3 3 3 0 0 3   SAB TAB Ectopic Multiple Live Births   0 0 0 0 3      # Outcome Date GA Lbr Jorge/2nd Weight Sex Delivery Anes PTL Lv   3 Term 20 39w1d  4.763 kg (10 lb 8 oz) M CS-LTranv   JAZ      Name: GLENNY,MALE-BENITA      Apgar1: 8  Apgar5: 9   2 Term 18 39w0d  4.593 kg (10 lb 2 oz) F CS-LTranv  N JAZ      Name: GLENNYBABY1 BENITA      Apgar1: 9  Apgar5: 9   1 Term 16 39w6d / 02:57 3.98 kg (8 lb 12.4 oz) M Vag-Spont EPI  JAZ      Apgar1: 9  Apgar5: 9      Obstetric Comments   Induced d/t anticoagulant use. + PPH. Denies GDM, HTN, PPD.     Pap 2019: NIL/HPV neg    Past Medical History:   Diagnosis Date     Abnormal Pap smear of cervix     conization Dr. Fernandez.  f/u , paps normal      ASCUS on Pap smear 2012    + hpv,     BMI 32.0-32.9,adult      DVT of upper extremity (deep vein thrombosis) (H)     right arm      Factor V deficiency (H)     homozygous      Mass     URETHRAl mass      PE (pulmonary embolism)     was on birth control at time, hosp x 4-5 d        Past Surgical History:   Procedure Laterality Date     APPENDECTOMY OPEN CHILD  1992      SECTION N/A 2018    Procedure:  SECTION;  Primary  Section;  Surgeon: Junie Hightower MD;  [facility]      SECTION  N/A 2020    Procedure:  SECTION;  Surgeon: Olga Clemente MD;  [facility]: [facility]     CONIZATION  2012    Procedure: CONIZATION;  Cold Knife Cone   Choice anesthesia;  Surgeon: Doreen Fernandez MD;  [facility]     CYSTOSCOPY, EXCISE URETHRAL CARUNCLE, COMBINED  2012    Procedure: COMBINED CYSTOSCOPY, EXCISE URETHRAL CARUNCLE;  Cystoscopy and Excision of Urethral Mass;  Surgeon: Rasheeda Salazar MD;  Location: [facility] OR     wisdom teeth         Current Outpatient Medications   Medication     acetaminophen (TYLENOL) 325 MG tablet     cholecalciferol (VITAMIN D3) 1000 units (25 mcg) capsule     enoxaparin (LOVENOX) 100 MG/ML syringe     ibuprofen (ADVIL/MOTRIN) 600 MG tablet     Misc. Devices (BREAST PUMP) MISC     mupirocin (BACTROBAN) 2 % external ointment     Prenatal Vit-Fe Fumarate-FA (PRENATAL VITAMIN PO)     ranitidine (RANITIDINE 150 MAX STRENGTH) 150 MG tablet     senna-docusate (SENOKOT-S/PERICOLACE) 8.6-50 MG tablet     VITAMIN D, CHOLECALCIFEROL, PO     No current facility-administered medications for this visit.        Allergies   Allergen Reactions     Misoprostol      Tolerated multiple low doses miso induction for labor. However, one hour after delivery when she received 800 mcg miso, her skin turned blue in color and face pale and had tachycardia. No throat closure or difficulty breathing or chest pain. Quickly resolved with IV Benadryl. Patient satted just fine. Could receive again in the future; however, monitor for this side effect.       Family History   Problem Relation Age of Onset     Cardiovascular Paternal Grandmother         had aneurism     Blood Disease Sister         factor V     Blood Disease Brother         factor V  brother carry gene     Blood Disease Mother         factor V     Blood Disease Father         factor V     Circulatory Maternal Uncle         uncle with blood clots     Circulatory Paternal Uncle         uncle with blood clot  "      SOCIAL HISTORY  Social History     Tobacco Use     Smoking status: Never Smoker     Smokeless tobacco: Never Used   Substance Use Topics     Alcohol use: No     Alcohol/week: 0.0 standard drinks     Comment: rare use if ever     Drug use: No       REVIEW OF SYSTEMS  A 10 point review of systems including Constitutional, Eyes, Respiratory, Cardiovascular, Gastroenterology, Genitourinary, Integumentary, Musculoskeletal, and Psychiatric, were all negative, except for pertinent positives noted in the above HPI.    OBJECTIVE  Ht 1.803 m (5' 11\")   Wt 100.2 kg (221 lb)   LMP 2019   BMI 30.82 kg/m       General: Alert, without distress   Abdomen: Incision clean dry and intact without erythema.    ASSESSMENT/PLAN  Benita Olivo is a 39 year old , here for her 6 week postpartum visit.    Cervical cancer screening: last pap 2019 NIL/HPV neg, due 2024.    Contraception offered, and Benita declines.    Pt to follow up with hematology regarding when she would like to transition back to warfarin.    Reports a new mole she wishes to have evaluated/removed. Dermatology referral placed.    Brian Morales, MS3    OBGYN Attending Addendum    I appreciate the note above by medical student, Brian Morales.  I, Alyssia Grullon, was present with the medical student, who participated in the service and in the documentation of the note. I have verified the history and personally performed the physical exam and medical decision making. I have edited accordingly and agree with the assessment and plan of care as documented in the note.    Alyssia Grullon MD, MSCI    [facility]/OB[facility]  "

## 2020-03-10 ENCOUNTER — OFFICE VISIT (OUTPATIENT)
Dept: OBGYN | Facility: CLINIC | Age: 40
End: 2020-03-10
Attending: OBSTETRICS & GYNECOLOGY
Payer: COMMERCIAL

## 2020-03-10 ENCOUNTER — TELEPHONE (OUTPATIENT)
Dept: HEMATOLOGY | Facility: CLINIC | Age: 40
End: 2020-03-10

## 2020-03-10 VITALS — WEIGHT: 221 LBS | HEIGHT: 71 IN | BODY MASS INDEX: 30.94 KG/M2

## 2020-03-10 DIAGNOSIS — Z79.01 CURRENT USE OF LONG TERM ANTICOAGULATION: Primary | ICD-10-CM

## 2020-03-10 DIAGNOSIS — L98.9 SKIN LESION: ICD-10-CM

## 2020-03-10 DIAGNOSIS — D68.51 HOMOZYGOUS FACTOR V LEIDEN MUTATION (H): ICD-10-CM

## 2020-03-10 ASSESSMENT — ANXIETY QUESTIONNAIRES
6. BECOMING EASILY ANNOYED OR IRRITABLE: NOT AT ALL
2. NOT BEING ABLE TO STOP OR CONTROL WORRYING: NOT AT ALL
GAD7 TOTAL SCORE: 0
7. FEELING AFRAID AS IF SOMETHING AWFUL MIGHT HAPPEN: NOT AT ALL
5. BEING SO RESTLESS THAT IT IS HARD TO SIT STILL: NOT AT ALL
1. FEELING NERVOUS, ANXIOUS, OR ON EDGE: NOT AT ALL
3. WORRYING TOO MUCH ABOUT DIFFERENT THINGS: NOT AT ALL

## 2020-03-10 ASSESSMENT — PATIENT HEALTH QUESTIONNAIRE - PHQ9
5. POOR APPETITE OR OVEREATING: NOT AT ALL
SUM OF ALL RESPONSES TO PHQ QUESTIONS 1-9: 0

## 2020-03-10 ASSESSMENT — MIFFLIN-ST. JEOR: SCORE: 1773.58

## 2020-03-10 NOTE — TELEPHONE ENCOUNTER
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     We last saw her last in  while she was in her third trimester of pregnancy and on therapeutic anticoagulation with enoxaparin.      She delivered vis  on 2020. She was in to her post partum visit today and had blood drawn.  She remembers Dr. Garcia discussing getting iron levels after she delivers but said there were not orders inplace.  She is wondering if this is still needed.    I told her I would check with her provider and we would call her back to let her know the outcome.    Francesca Mayers, RN - Nurse Clinician - [facility] - [phone]

## 2020-03-10 NOTE — LETTER
3/10/2020       RE: Benita Olivo  [address] [address]     Dear Colleague,    Thank you for referring your patient, Benita Olivo, to the [facility] at [facility]. Please see a copy of my visit note below.    [facility]  Postpartum Visit    SUBJECTIVE    Benita Olivo is a 39 year old  who is 6 weeks after a RLTCS on 20. She overall feels well today. Her vaginal bleeding stopped 5 weeks after delivery and she has not experienced any sense. She denies any abdominal pain, bowel concerns, or urinary concerns. She is breastfeeding. Her mood is positive. She is currently taking lovenox for DVT prophylaxis as she is FVL heterozygous; she hasn't yet made plans with the hematology team to transition to warfarin. Reports new mole, requests Dermatology referral for evaluation/removal.    OB History    Para Term  AB Living   3 3 3 0 0 3   SAB TAB Ectopic Multiple Live Births   0 0 0 0 3      # Outcome Date GA Lbr Jorge/2nd Weight Sex Delivery Anes PTL Lv   3 Term 20 39w1d  4.763 kg (10 lb 8 oz) M CS-LTranv   JAZ      Name: GLENNYMALE-BENITA      Apgar1: 8  Apgar5: 9   2 Term 18 39w0d  4.593 kg (10 lb 2 oz) F CS-LTranv  N JAZ      Name: GLENNYBABY1 BENITA      Apgar1: 9  Apgar5: 9   1 Term /16 39w6d / 02:57 3.98 kg (8 lb 12.4 oz) M Vag-Spont EPI  JAZ      Apgar1: 9  Apgar5: 9      Obstetric Comments   Induced d/t anticoagulant use. + PPH. Denies GDM, HTN, PPD.     Pap 2019: NIL/HPV neg    Past Medical History:   Diagnosis Date     Abnormal Pap smear of cervix     conization Dr. Fernandez.  f/u , paps normal      ASCUS on Pap smear 2012    + hpv,     BMI 32.0-32.9,adult      DVT of upper extremity (deep vein thrombosis) (H)     right arm      Factor V deficiency (H)     homozygous      Mass 2011    URETHRAl mass      PE (pulmonary embolism)     was on birth  control at time, hosp x 4-5 d        Past Surgical History:   Procedure Laterality Date     APPENDECTOMY OPEN CHILD        SECTION N/A 2018    Procedure:  SECTION;  Primary  Section;  Surgeon: Junie Hightower MD;  [facility]      SECTION N/A 2020    Procedure:  SECTION;  Surgeon: Olga Clemente MD;  [facility]     CONIZATION  2012    Procedure: CONIZATION;  Cold Knife Cone   Choice anesthesia;  Surgeon: Doreen Fernandez MD;  [facility]     CYSTOSCOPY, EXCISE URETHRAL CARUNCLE, COMBINED  2012    Procedure: COMBINED CYSTOSCOPY, EXCISE URETHRAL CARUNCLE;  Cystoscopy and Excision of Urethral Mass;  Surgeon: Rasheeda Salazar MD;  [facility]     wisdom teeth         Current Outpatient Medications   Medication     acetaminophen (TYLENOL) 325 MG tablet     cholecalciferol (VITAMIN D3) 1000 units (25 mcg) capsule     enoxaparin (LOVENOX) 100 MG/ML syringe     ibuprofen (ADVIL/MOTRIN) 600 MG tablet     Misc. Devices (BREAST PUMP) MISC     mupirocin (BACTROBAN) 2 % external ointment     Prenatal Vit-Fe Fumarate-FA (PRENATAL VITAMIN PO)     ranitidine (RANITIDINE 150 MAX STRENGTH) 150 MG tablet     senna-docusate (SENOKOT-S/PERICOLACE) 8.6-50 MG tablet     VITAMIN D, CHOLECALCIFEROL, PO     No current facility-administered medications for this visit.        Allergies   Allergen Reactions     Misoprostol      Tolerated multiple low doses miso induction for labor. However, one hour after delivery when she received 800 mcg miso, her skin turned blue in color and face pale and had tachycardia. No throat closure or difficulty breathing or chest pain. Quickly resolved with IV Benadryl. Patient satted just fine. Could receive again in the future; however, monitor for this side effect.       Family History   Problem Relation Age of Onset     Cardiovascular Paternal Grandmother         had aneurism     Blood Disease Sister   "       factor V     Blood Disease Brother         factor V  brother carry gene     Blood Disease Mother         factor V     Blood Disease Father         factor V     Circulatory Maternal Uncle         uncle with blood clots     Circulatory Paternal Uncle         uncle with blood clot       SOCIAL HISTORY  Social History     Tobacco Use     Smoking status: Never Smoker     Smokeless tobacco: Never Used   Substance Use Topics     Alcohol use: No     Alcohol/week: 0.0 standard drinks     Comment: rare use if ever     Drug use: No       REVIEW OF SYSTEMS  A 10 point review of systems including Constitutional, Eyes, Respiratory, Cardiovascular, Gastroenterology, Genitourinary, Integumentary, Musculoskeletal, and Psychiatric, were all negative, except for pertinent positives noted in the above HPI.    OBJECTIVE  Ht 1.803 m (5' 11\")   Wt 100.2 kg (221 lb)   LMP 2019   BMI 30.82 kg/m       General: Alert, without distress   Abdomen: Incision clean dry and intact without erythema.    ASSESSMENT/PLAN  Benita Olivo is a 39 year old , here for her 6 week postpartum visit.    Cervical cancer screening: last pap 2019 NIL/HPV neg, due 2024.    Contraception offered, and Benita declines.    Pt to follow up with hematology regarding when she would like to transition back to warfarin.    Reports a new mole she wishes to have evaluated/removed. Dermatology referral placed.    Brian Morales, MS3    OBGYN Attending Addendum    I appreciate the note above by medical student, Brian Morales.  I, Alyssia Grullon, was present with the medical student, who participated in the service and in the documentation of the note. I have verified the history and personally performed the physical exam and medical decision making. I have edited accordingly and agree with the assessment and plan of care as documented in the note.    Alyssia Grullon MD, MSCI    [facility]      "

## 2020-03-10 NOTE — NURSING NOTE
SUBJECTIVE:   Benita Olivo is here for her 6-week postpartum checkup.     PHQ-9 score: 0  Hx of Abuse:  No    Delivery Date: 20.    Delivering provider:  Olga Clemente MD.    Type of delivery:  Repeat .     Delivery complications: None  Infant gender:  boy, weight 10 pounds 8 oz.  Feeding Method:  .  Complications reported with feeding:  none, infant thriving .    Bleeding:  None.  Duration:  4 weeks.  Menses resumed:  No  Bowel/Urinary problems:  No    Contraception Planned:  None -- is she planning pregnancy? no  She  has not had intercourse since delivery..

## 2020-03-11 ASSESSMENT — ANXIETY QUESTIONNAIRES: GAD7 TOTAL SCORE: 0

## 2020-03-30 ENCOUNTER — TELEPHONE (OUTPATIENT)
Dept: DERMATOLOGY | Facility: CLINIC | Age: 40
End: 2020-03-30

## 2020-03-30 NOTE — TELEPHONE ENCOUNTER
I left a message for patient to call [facility].    Pt on wait list for skin lesion.  Please offer patient video visit or telephone visit with MyChart photos.    She is already signed up for MyChart.  Maite Núñez RN

## 2020-04-26 NOTE — PATIENT INSTRUCTIONS
"  Thank you for choosing [facility] for your obstetrical care.  We are an integrated health clinic with obstetricians, midwives, a psychologist, an acupuncturist, a nutritionist, a pharmacist, internal medicine and family practice all in one.  If you have questions about services offered please ask.      o Please keep all appointments with your provider.  You will help catch, prevent and treat problems early.  o Review your Expectant Family booklet and folder given to you at this intake visit.  It can answer many basic questions including:    Treatment for nausea and vomiting    Medications that are safe in pregnancy    Healthy diet and weight gain    Exercise and activity  o ASK questions!!  Please use \"Questions for my New OB visit\" form to write down any questions or concerns for your next visit.  o Eat a healthy diet.  Visit www.choosemyplate.gov and click on  Pregnancy and Breastfeeding  for information and tips  o Do not smoke.  Avoid other people's smoke, too.  We are happy to help with referrals to stop smoking programs.  o Do not drink alcohol.  o Try to avoid people who have colds or other infections.  Practice good hand washing.  o Consider registering for our Healthy Pregnancy Class here at [facility].  This class is offered every 3rd Wednesday from 2:30-4:30 p.m.  [place] at [phone] or online at [email or web address]  o Consider registering for prenatal education classes through [place] at [facility].  You can view class schedules and register online at [email or web address] or call [phone] for questions     For urgent concerns, call [facility] at [phone] to speak with a triage nurse during regular clinic hours (8:00 am - 4:30 pm).  This line is answered by our service 24 hours a day, after hours a provider will return your call.  "
- - -

## 2020-05-13 ENCOUNTER — VIRTUAL VISIT (OUTPATIENT)
Dept: DERMATOLOGY | Facility: CLINIC | Age: 40
End: 2020-05-13
Payer: COMMERCIAL

## 2020-05-13 DIAGNOSIS — D23.9 DERMATOFIBROMA: Primary | ICD-10-CM

## 2020-05-13 PROCEDURE — 99202 OFFICE O/P NEW SF 15 MIN: CPT | Mod: GT | Performed by: DERMATOLOGY

## 2020-05-13 NOTE — PROGRESS NOTES
Benita Olivo M  You 57 minutes ago (9:22 AM)          Melo Leiva,     I would like to wait to restart since it would require regular blood checks in the lab.  I have small children at home and want to limit risks to COVID exposure.     Thanks!            You  Benita Olivo 9 days ago          Melo Joy,  Just checking in to see if you decided when you are going to restart your warfarin.  Cali Wade [facility]  [phone]

## 2020-05-13 NOTE — PROGRESS NOTES
[facility]  Store and Forward and Video ( Invitation sent by:  Julio and text to cell phone: [phone] )      Impression and Recommendations (Patient Counseled on the Following):  1. Dermatofibroma, left medial calf: Unchanged for 5+ years. No concerning symptoms. Bothersome to patient with shaving. Discussed the pathogenesis of DFs. Explained high chance of recurrence with removal. Recommended avoiding removal for this reason along with the fact that this is a benign lesion. Patient noted understanding.       Follow-up:   PRN     Staff only:    Antonia Smith MD    Department of Dermatology  [facility]: Phone: [phone], Fax:[phone]  [facility]: Phone: [phone], Fax: [phone]          _____________________________________________________________________________    Dermatology Problem List:  1. DF, left calf  2. Relevant medical history: Factor V Leiden, history of DVT/PE, on current anticoagulation    Encounter Date: May 13, 2020    CC:   Chief Complaint   Patient presents with     Derm Problem       History of Present Illness:  I have reviewed the teledermatology information and the nursing intake corresponding to this issue. Benitastaci Olivo is a 39 year old female who presents via teledermatology for lesion of concern.    Benita notes this has been present on the left lower leg for at least 5 years. She saw a dermatologist for it about 3 years ago. She was told it was not cancerous and could be removed if she wanted to have it removed. She notes it has not changed in the last five years. She does not like the appearance of it and sometimes shaves over it. She would like to have it removed.    No personal history of skin cancer.       ROS: Patient is generally feeling well today    Physical Examination:  General: Well-appearing female,  appropriately-developed individual.  Skin: Focused examination within the teledermatology photograph(s) including face, left lower leg was performed.   -Zamora type II  -Very few solar lentigines on the face  -Pink brown papule on the left lower leg just distal to the knee and on medial aspect.     Labs:  None    Past Medical History:   Patient Active Problem List   Diagnosis     Adenocarcinoma in situ (AIS) of uterine cervix     Female infertility of other specified origin     Homozygous Factor V Leiden mutation (H)     History of pulmonary embolism     Rh negative state in antepartum period     Prenatal care in third trimester     Deep vein thrombosis (DVT) (H) [I82.409]     Long-term (current) use of anticoagulants [Z79.01]     Supervision of high-risk pregnancy of elderly multigravida     History of conization of cervix     S/P  section     Pain in thoracic spine     History of macrosomia in infant in prior pregnancy, currently pregnant     BMI 32.0-32.9,adult     Vitamin D deficiency     Low-lying placenta     Marginal insertion of umbilical cord affecting management of mother     Fetal macrosomia in third trimester, single or unspecified fetus     Encounter for supervision of other normal pregnancy in third trimester     Status post repeat low transverse  section     Past Medical History:   Diagnosis Date     Abnormal Pap smear of cervix 2012    conization Dr. Fernandez.  f/u , paps normal      ASCUS on Pap smear 2012    + hpv,     BMI 32.0-32.9,adult      DVT of upper extremity (deep vein thrombosis) (H)     right arm      Factor V deficiency (H)     homozygous      Mass     URETHRAl mass      PE (pulmonary embolism)     was on birth control at time, hosp x 4-5 d      Past Surgical History:   Procedure Laterality Date     APPENDECTOMY OPEN CHILD        SECTION N/A 2018    Procedure:  SECTION;  Primary  Section;  Surgeon: Katja  Junie Mora MD;  Location: [facility] L[facility]      SECTION N/A 2020    Procedure:  SECTION;  Surgeon: Olga Clemente MD;  Location: [facility] L[facility]     CONIZATION  2012    Procedure: CONIZATION;  Cold Knife C[facility]   Choice anesthesia;  Surgeon: Doreen Fernandez MD;  Location: [facility] OR     CYSTOSCOPY, EXCISE URETHRAL CARUNCLE, COMBINED  2012    Procedure: COMBINED CYSTOSCOPY, EXCISE URETHRAL CARUNCLE;  Cystoscopy and Excision of Urethral Mass;  Surgeon: Rasheeda Salazar MD;  Location: [facility] OR     wisdom teeth         Social History:  Patient reports that she has never smoked. She has never used smokeless tobacco. She reports that she does not drink alcohol or use drugs. Past tanning bed use 15+ years ago.    Family History:  Family History   Problem Relation Age of Onset     Cardiovascular Paternal Grandmother         had aneurism     Blood Disease Sister         factor V     Blood Disease Brother         factor V  brother carry gene     Blood Disease Mother         factor V     Blood Disease Father         factor V     Circulatory Maternal Uncle         uncle with blood clots     Circulatory Paternal Uncle         uncle with blood clot   Father with precancer or skin cancer of unknown type.    Medications:  Current Outpatient Medications   Medication     acetaminophen (TYLENOL) 325 MG tablet     cholecalciferol (VITAMIN D3) 1000 units (25 mcg) capsule     enoxaparin (LOVENOX) 100 MG/ML syringe     Prenatal Vit-Fe Fumarate-FA (PRENATAL VITAMIN PO)     No current facility-administered medications for this visit.           Allergies   Allergen Reactions     Misoprostol      Tolerated multiple low doses miso induction for labor. However, one hour after delivery when she received 800 mcg miso, her skin turned blue in color and face pale and had tachycardia. No throat closure or difficulty breathing or chest pain. Quickly resolved with IV Benadryl. Patient satted just fine. Could receive again  "in the future; however, monitor for this side effect.         _____________________________________________________________________________    Teledermatology information:  - Location of patient: Home  - Patient presented as: self referral  - Location of teledermatologist:  ([facility] )  - Reason teledermatology is appropriate:  of National Emergency Regarding Coronavirus disease (COVID 19) Outbreak  - Image quality and interpretability: acceptable  - Physician has received verbal consent for a Video/Photos Visit from the patient? Yes  - In-person dermatology visit recommendation: no  - Date of images: 5/12/20  - Service start time:9:28am  - Service end time:9:35am  - Date of report: 5/13/2020     Teledermatology Nurse Call for NEW Patients (not seen in last 3 years):     The patient was contacted by phone and we reviewed, \"Due to the coronavirus pandemic, we are calling to reschedule your upcoming visit and offer you a teledermatology visit. This would be assessed by an MD or PA-C;  Importantly, \"a teledermatology visit may not be as thorough as an in-person visit, and the quality of the photograph and/or video sent may not be of the same quality as that taken by the dermatology clinic.\" After discussing the risks, benefits, and alternatives, the patient would like to proceed with teledermatology because of National Emergency Regarding Coronavirus disease (COVID 19) Outbreak.\"    This video visit will be conducted via a call between you and your physician/provider via Emair. We have found that certain health care needs can be provided without the need for an in-person physical exam.  This service lets us provide the care you need with a video conversation.  If a prescription is necessary we can send it directly to your pharmacy.  If lab work is needed we can place an order for that and you can then stop by our lab to have the test done at a later time.If during the course of the call the " physician/provider feels a video visit is not appropriate, you will not be charged for this service.    Patient would like the video invitation sent by: Text to cell phone: see above     The patient will also send photographs via Preact for review.       The patient verified the location of the photo/video to be their home address.      For photos, patient told nursing these will be uploaded prior to visit     The patient was instructed to take photos of all areas of concern and all areas of any rash.       The patient denied skin pain, fever, mucosal symptoms(lesions, blisters, sores in the mouth, nose, eyes, or genitals) IF PATIENT ENDORSES ANY OF THESE STOP AND PAGE ON CALL ATTENDING    Additional notes:  Patient summary of issue:Bump  Location of problem on body: inner left knee  How long has area/symptoms been present: 5 years, has not changed  What makes it better?: N/A  What makes it worse?: N/A  Other symptoms include the following: N/A  Which mediations have been tried, for how long, and did they make it better or worse (ex. Triamcinolone, used twice daily for 2 weeks, not improved): N/A  The patient has seen a dermatologist in the past for same lesion.   The patient has no past medical history of skin cancer  ROS: The patient is generally feeling well.       Nursing tasks completed  -Pharmacy preference was updated.  -The nurse has dropped in the AVS information *(For adults the phrase is umdermhteleavs and for pediatrics it is their own) for the physician to route in the AVS.                                                                                                                                                                                                                         -The patient was told to contact the clinic if they have not received correspondence within 72 hours.     Wendy Toussaint LPN

## 2020-05-13 NOTE — PATIENT INSTRUCTIONS
[facility] Teledermatology Visit    Thank you for allowing us to participate in your care. Your findings, instructions and follow-up plan are as follows:    Dermatofibroma: Scar tissue bump on the leg  -These are harmless (benign) growths common to young healthy women  -I do not recommend removal as the chance of recurrence is quite high.    When should I call my doctor?    If you are worsening or not improving, please, contact us or seek urgent care as noted below.     Who should I call with questions (adults)?    [facility] (adult and pediatric): [phone]     [facility] (adult): [phone]    For urgent needs outside of business hours call the [facility] at [phone] and ask for the dermatology resident on call    If this is a medical emergency and you are unable to reach an ER, Call 911      Who should I call with questions (pediatric)?  [facility]- Pediatric Dermatology  Dr. Naa Stout, Dr. Reyes Norris, Dr. Karen Isabel, Anabel Villatoro, PA  Dr. Chloe Prather, Dr. Racquel Brito & Dr. Johnny Davis  Non Urgent  Nurse Triage Line; [phone]- Kitty and Lizz HONEYCUTT Care Coordinators   Johnna (/Complex ) [phone]    If you need a prescription refill, please contact your pharmacy. Refills are approved or denied by our Physicians during normal business hours, Monday through Fridays  Per office policy, refills will not be granted if you have not been seen within the past year (or sooner depending on your child's condition)    Scheduling Information:  Pediatric Appointment Scheduling and Call Center [phone]  Radiology Scheduling- [phone]  Sedation Unit Scheduling- [phone]  [place] Scheduling- General [phone]; Pediatric Dermatology [phone]  Main  Services: [phone]  [place]: [phone]  [place]:  [phone]  Mark/Reymundo/Wallisian: [phone]  Preadmission Nursing Department Fax Number: [phone] (Fax all pre-operative paperwork to this number)    For urgent matters arising during evenings, weekends, or holidays that cannot wait for normal business hours please call [phone] and ask for the Dermatology Resident On-Call to be paged.

## 2020-08-05 ENCOUNTER — DOCUMENTATION ONLY (OUTPATIENT)
Dept: ANTICOAGULATION | Facility: CLINIC | Age: 40
End: 2020-08-05

## 2020-08-05 DIAGNOSIS — I82.409 DEEP VEIN THROMBOSIS (DVT) (H): ICD-10-CM

## 2020-08-05 DIAGNOSIS — Z79.01 LONG TERM CURRENT USE OF ANTICOAGULANT THERAPY: ICD-10-CM

## 2020-08-05 DIAGNOSIS — D68.51 HOMOZYGOUS FACTOR V LEIDEN MUTATION (H): ICD-10-CM

## 2020-08-05 NOTE — PROGRESS NOTES
Pt continues to be off of Warfarin and is continuing her Lovenox 90mg Q 12 Hours due to Covid-19. Will follow-up with pt at the end of September or beginning of October due to pt will be running out of Lovenox.

## 2020-09-30 ENCOUNTER — DOCUMENTATION ONLY (OUTPATIENT)
Dept: ANTICOAGULATION | Facility: CLINIC | Age: 40
End: 2020-09-30

## 2020-09-30 DIAGNOSIS — I82.409 DEEP VEIN THROMBOSIS (DVT) (H): ICD-10-CM

## 2020-09-30 DIAGNOSIS — D68.51 HOMOZYGOUS FACTOR V LEIDEN MUTATION (H): ICD-10-CM

## 2020-09-30 DIAGNOSIS — Z79.01 LONG TERM CURRENT USE OF ANTICOAGULANT THERAPY: ICD-10-CM

## 2020-11-29 ENCOUNTER — HEALTH MAINTENANCE LETTER (OUTPATIENT)
Age: 40
End: 2020-11-29

## 2020-12-30 ENCOUNTER — TELEPHONE (OUTPATIENT)
Dept: ANTICOAGULATION | Facility: CLINIC | Age: 40
End: 2020-12-30

## 2020-12-30 NOTE — TELEPHONE ENCOUNTER
Spoke with patient today to see if she is still using Lovenox.  She is , and has no plan to go back to coumadin in the near future due to the pandemic.

## 2021-04-10 ENCOUNTER — HEALTH MAINTENANCE LETTER (OUTPATIENT)
Age: 41
End: 2021-04-10

## 2021-05-11 ENCOUNTER — TELEPHONE (OUTPATIENT)
Dept: ANTICOAGULATION | Facility: CLINIC | Age: 41
End: 2021-05-11

## 2021-05-11 NOTE — TELEPHONE ENCOUNTER
Spoke with Benita. She is continuing on Lovenox at this time-plans to continue Lovenox for approx 4 weeks and will return to Warfarin. Her primary clinic at [facility] is closed-she will look into which clinic she will go to for INR labs when Warfarin is restarted. Reminder sent to [facility] pool for 4 weeks from today.    SHAHBAZ FREEMAN RN-BC, [facility]  Anticoagulation Clinic  [phone]

## 2021-05-12 ENCOUNTER — TELEPHONE (OUTPATIENT)
Dept: HEMATOLOGY | Facility: CLINIC | Age: 41
End: 2021-05-12

## 2021-05-13 NOTE — TELEPHONE ENCOUNTER
Benita Olivo is on long term anticoagulation and has been followed by the [facility].  She has been on Lovenox injections alone for some time and she would like to discuss going back on oral medication, but would prefer drug other than Warfarin.      Left patient a message back saying she should call to schedule an appt to discuss as it has been > one year since she was seen.  She has been seen by BENNETT Littlejohn and Dr. Parra in past.    Francesca Mayers, RN - Nurse Clinician - [facility] - [phone]

## 2021-07-26 ENCOUNTER — OFFICE VISIT (OUTPATIENT)
Dept: FAMILY MEDICINE | Facility: CLINIC | Age: 41
End: 2021-07-26
Payer: COMMERCIAL

## 2021-07-26 VITALS
TEMPERATURE: 97.3 F | SYSTOLIC BLOOD PRESSURE: 104 MMHG | HEART RATE: 78 BPM | WEIGHT: 221.31 LBS | BODY MASS INDEX: 30.98 KG/M2 | RESPIRATION RATE: 14 BRPM | DIASTOLIC BLOOD PRESSURE: 72 MMHG | OXYGEN SATURATION: 96 % | HEIGHT: 71 IN

## 2021-07-26 DIAGNOSIS — K59.01 SLOW TRANSIT CONSTIPATION: Primary | ICD-10-CM

## 2021-07-26 PROCEDURE — 99214 OFFICE O/P EST MOD 30 MIN: CPT | Performed by: FAMILY MEDICINE

## 2021-07-26 ASSESSMENT — MIFFLIN-ST. JEOR: SCORE: 1764.74

## 2021-07-26 NOTE — PATIENT INSTRUCTIONS
Take stool softener 2 tabs this evening, then 3 tabs every day.    Push fluids, i.e. 8 ounces additional midmorning and midafternoon    Walk 15 to 20 minutes early morning and later in the evening when cooler.    Fruit smoothies with 1 to 3 tablespoons flaxseed\bran twice daily until 3 large BMs, then once daily indefinitely    If fever, increased pain, nausea then contact us.  Depending on symptoms may recommend abdominal ultrasound to check for gallstones, or barium swallow to check for hiatal hernia.

## 2021-07-26 NOTE — PROGRESS NOTES
"Assessment & Plan     Slow transit constipation  V biliary colic v hiatal hernia/PUD    Review of external notes as documented elsewhere in note  30 minutes spent on the date of the encounter doing chart review, history and exam, documentation and further activities per the note     BMI:   Estimated body mass index is 30.88 kg/m  as calculated from the following:    Height as of this encounter: 1.803 m (5' 10.98\").    Weight as of this encounter: 100.4 kg (221 lb 5 oz).   Weight management plan: Discussed healthy diet and exercise guidelines    Patient Instructions   Take stool softener 2 tabs this evening, then 3 tabs every day.    Push fluids, i.e. 8 ounces additional midmorning and midafternoon    Walk 15 to 20 minutes early morning and later in the evening when cooler.    Fruit smoothies with 1 to 3 tablespoons flaxseed\bran twice daily until 3 large BMs, then once daily indefinitely    If fever, increased pain, nausea then contact us.  Depending on symptoms may recommend abdominal ultrasound to check for gallstones, or barium swallow to check for hiatal hernia.      Return if symptoms worsen or fail to improve.    Daniel Thompson MD  [facility] SILVANO Joy is a 41 year old who presents for the following health issues    HPI     Abdominal/Flank Pain  Onset/Duration: Last Monday - has gradually increased since then .  Description:   Character: consistent soreness   Location: mid abdominal area   Radiation: None  Intensity: mild with taking tylenol   Progression of Symptoms:  worsening  Accompanying Signs & Symptoms:  Fever/Chills: no  Gas/Bloating: bloating and gas through out the week .  Nausea: no  Vomitting: no  Diarrhea: no  Constipation: some  Dysuria or Hematuria: no  History:   Trauma: no  Previous similar pain: no  Previous tests done: none  Precipitating factors:   Does the pain change with:     Food: YES    Bowel Movement: no    Urination: no   Other factors:  " "no  Therapies tried and outcome: has been taking tylenol   Patient's last menstrual period was 07/04/2021 (approximate).    Review of Systems   Constitutional, HEENT, cardiovascular, pulmonary, gi and gu systems are negative, except as otherwise noted.      Objective    /72   Pulse 78   Temp 97.3  F (36.3  C) (Temporal)   Resp 14   Ht 1.803 m (5' 10.98\")   Wt 100.4 kg (221 lb 5 oz)   LMP 07/04/2021 (Approximate)   SpO2 96%   BMI 30.88 kg/m    Body mass index is 30.88 kg/m .  Physical Exam   GENERAL: healthy, alert and no distress  RESP: lungs clear to auscultation - no rales, rhonchi or wheezes  CV: regular rate and rhythm, normal S1 S2, no S3 or S4, no murmur, click or rub, no peripheral edema and peripheral pulses strong  ABDOMEN: minimal tenderness epigastric, no organomegaly or masses, bowel sounds normal and well-healed incision appy and hysterectomy  MS: no gross musculoskeletal defects noted, no edema        "

## 2021-07-27 ENCOUNTER — TELEPHONE (OUTPATIENT)
Dept: FAMILY MEDICINE | Facility: CLINIC | Age: 41
End: 2021-07-27

## 2021-07-27 DIAGNOSIS — R10.13 EPIGASTRIC PAIN: Primary | ICD-10-CM

## 2021-07-27 NOTE — TELEPHONE ENCOUNTER
Order placed for ultrasound after review of chart.    Please call patient to advise.    Oli Lee MD

## 2021-07-27 NOTE — TELEPHONE ENCOUNTER
Dr. Thompson and POD,    Pt calling because she had a visit with Dr. Thompson yesterday and was advised that if her symptoms worsen Dr. Thompson would order imaging. Pt reports that symptoms are worse today - states that anytime she eats or drinks, she experiences abdominal pain 6-8/10. Also reports that pain is still present after having 2 BMs yesterday.    From yesterday's visit notes:  If fever, increased pain, nausea then contact us.  Depending on symptoms may recommend abdominal ultrasound to check for gallstones, or barium swallow to check for hiatal hernia.      Sanam Jaquez RN  [facility]

## 2021-07-27 NOTE — TELEPHONE ENCOUNTER
Attempted to call and speak with patient. Got her voicemail. Need to know what the pain feels like and where it is.

## 2021-07-28 ENCOUNTER — TELEPHONE (OUTPATIENT)
Dept: FAMILY MEDICINE | Facility: CLINIC | Age: 41
End: 2021-07-28

## 2021-07-28 ENCOUNTER — HOSPITAL ENCOUNTER (OUTPATIENT)
Dept: CT IMAGING | Facility: CLINIC | Age: 41
End: 2021-07-28
Attending: FAMILY MEDICINE
Payer: COMMERCIAL

## 2021-07-28 ENCOUNTER — HOSPITAL ENCOUNTER (OUTPATIENT)
Dept: ULTRASOUND IMAGING | Facility: CLINIC | Age: 41
End: 2021-07-28
Attending: FAMILY MEDICINE
Payer: COMMERCIAL

## 2021-07-28 ENCOUNTER — HOSPITAL ENCOUNTER (EMERGENCY)
Facility: CLINIC | Age: 41
Discharge: HOME | End: 2021-07-29
Attending: EMERGENCY MEDICINE | Admitting: EMERGENCY MEDICINE
Payer: COMMERCIAL

## 2021-07-28 DIAGNOSIS — D68.51 FACTOR V LEIDEN (H): ICD-10-CM

## 2021-07-28 DIAGNOSIS — R10.13 EPIGASTRIC PAIN: ICD-10-CM

## 2021-07-28 DIAGNOSIS — D68.51 FACTOR V LEIDEN MUTATION (H): ICD-10-CM

## 2021-07-28 DIAGNOSIS — R10.13 EPIGASTRIC PAIN: Primary | ICD-10-CM

## 2021-07-28 DIAGNOSIS — R10.13 ABDOMINAL PAIN, EPIGASTRIC: ICD-10-CM

## 2021-07-28 DIAGNOSIS — K55.069 MESENTERIC VEIN THROMBOSIS (H): ICD-10-CM

## 2021-07-28 DIAGNOSIS — Z11.52 ENCOUNTER FOR SCREENING LABORATORY TESTING FOR SEVERE ACUTE RESPIRATORY SYNDROME CORONAVIRUS 2 (SARS-COV-2): ICD-10-CM

## 2021-07-28 LAB
ALBUMIN SERPL-MCNC: 3.3 G/DL (ref 3.4–5)
ALP SERPL-CCNC: 135 U/L (ref 40–150)
ALT SERPL W P-5'-P-CCNC: 102 U/L (ref 0–50)
ANION GAP SERPL CALCULATED.3IONS-SCNC: 3 MMOL/L (ref 3–14)
APTT PPP: 41 SECONDS (ref 22–38)
AST SERPL W P-5'-P-CCNC: 47 U/L (ref 0–45)
BILIRUB SERPL-MCNC: 0.6 MG/DL (ref 0.2–1.3)
BUN SERPL-MCNC: 10 MG/DL (ref 7–30)
CALCIUM SERPL-MCNC: 9.2 MG/DL (ref 8.5–10.1)
CHLORIDE BLD-SCNC: 107 MMOL/L (ref 94–109)
CO2 SERPL-SCNC: 27 MMOL/L (ref 20–32)
CREAT SERPL-MCNC: 0.68 MG/DL (ref 0.52–1.04)
ERYTHROCYTE [DISTWIDTH] IN BLOOD BY AUTOMATED COUNT: 12.3 % (ref 10–15)
GFR SERPL CREATININE-BSD FRML MDRD: >90 ML/MIN/1.73M2
GLUCOSE BLD-MCNC: 86 MG/DL (ref 70–99)
HCG SERPL QL: NEGATIVE
HCT VFR BLD AUTO: 38.2 % (ref 35–47)
HGB BLD-MCNC: 12.7 G/DL (ref 11.7–15.7)
INR PPP: 1.11 (ref 0.85–1.15)
LACTATE SERPL-SCNC: 0.8 MMOL/L (ref 0.7–2)
LIPASE SERPL-CCNC: 63 U/L (ref 73–393)
MCH RBC QN AUTO: 29.6 PG (ref 26.5–33)
MCHC RBC AUTO-ENTMCNC: 33.2 G/DL (ref 31.5–36.5)
MCV RBC AUTO: 89 FL (ref 78–100)
PLATELET # BLD AUTO: 221 10E3/UL (ref 150–450)
POTASSIUM BLD-SCNC: 3.5 MMOL/L (ref 3.4–5.3)
PROT SERPL-MCNC: 7.8 G/DL (ref 6.8–8.8)
RADIOLOGIST FLAGS: ABNORMAL
RADIOLOGIST FLAGS: ABNORMAL
RBC # BLD AUTO: 4.29 10E6/UL (ref 3.8–5.2)
SARS-COV-2 RNA RESP QL NAA+PROBE: NEGATIVE
SODIUM SERPL-SCNC: 137 MMOL/L (ref 133–144)
WBC # BLD AUTO: 9.5 10E3/UL (ref 4–11)

## 2021-07-28 PROCEDURE — 258N000003 HC RX IP 258 OP 636: Performed by: EMERGENCY MEDICINE

## 2021-07-28 PROCEDURE — 99223 1ST HOSP IP/OBS HIGH 75: CPT | Mod: AI | Performed by: INTERNAL MEDICINE

## 2021-07-28 PROCEDURE — 99207 PR SATISFY VISIT NUMBER: CPT | Mod: GC | Performed by: SURGERY

## 2021-07-28 PROCEDURE — 74177 CT ABD & PELVIS W/CONTRAST: CPT | Mod: 26 | Performed by: RADIOLOGY

## 2021-07-28 PROCEDURE — C9803 HOPD COVID-19 SPEC COLLECT: HCPCS | Performed by: EMERGENCY MEDICINE

## 2021-07-28 PROCEDURE — 99291 CRITICAL CARE FIRST HOUR: CPT | Performed by: EMERGENCY MEDICINE

## 2021-07-28 PROCEDURE — 74177 CT ABD & PELVIS W/CONTRAST: CPT

## 2021-07-28 PROCEDURE — 36415 COLL VENOUS BLD VENIPUNCTURE: CPT | Performed by: EMERGENCY MEDICINE

## 2021-07-28 PROCEDURE — 83690 ASSAY OF LIPASE: CPT | Performed by: EMERGENCY MEDICINE

## 2021-07-28 PROCEDURE — 85610 PROTHROMBIN TIME: CPT | Performed by: EMERGENCY MEDICINE

## 2021-07-28 PROCEDURE — 76705 ECHO EXAM OF ABDOMEN: CPT

## 2021-07-28 PROCEDURE — 83605 ASSAY OF LACTIC ACID: CPT | Performed by: EMERGENCY MEDICINE

## 2021-07-28 PROCEDURE — 250N000011 HC RX IP 250 OP 636: Performed by: EMERGENCY MEDICINE

## 2021-07-28 PROCEDURE — 76705 ECHO EXAM OF ABDOMEN: CPT | Mod: 26 | Performed by: RADIOLOGY

## 2021-07-28 PROCEDURE — 99285 EMERGENCY DEPT VISIT HI MDM: CPT | Mod: 25 | Performed by: EMERGENCY MEDICINE

## 2021-07-28 PROCEDURE — 96376 TX/PRO/DX INJ SAME DRUG ADON: CPT | Performed by: EMERGENCY MEDICINE

## 2021-07-28 PROCEDURE — 250N000013 HC RX MED GY IP 250 OP 250 PS 637

## 2021-07-28 PROCEDURE — 250N000009 HC RX 250: Performed by: FAMILY MEDICINE

## 2021-07-28 PROCEDURE — 82040 ASSAY OF SERUM ALBUMIN: CPT | Performed by: EMERGENCY MEDICINE

## 2021-07-28 PROCEDURE — U0005 INFEC AGEN DETEC AMPLI PROBE: HCPCS | Performed by: EMERGENCY MEDICINE

## 2021-07-28 PROCEDURE — 85730 THROMBOPLASTIN TIME PARTIAL: CPT | Performed by: EMERGENCY MEDICINE

## 2021-07-28 PROCEDURE — 96365 THER/PROPH/DIAG IV INF INIT: CPT | Performed by: EMERGENCY MEDICINE

## 2021-07-28 PROCEDURE — 85027 COMPLETE CBC AUTOMATED: CPT | Performed by: EMERGENCY MEDICINE

## 2021-07-28 PROCEDURE — 250N000011 HC RX IP 250 OP 636: Performed by: FAMILY MEDICINE

## 2021-07-28 PROCEDURE — 96375 TX/PRO/DX INJ NEW DRUG ADDON: CPT | Performed by: EMERGENCY MEDICINE

## 2021-07-28 PROCEDURE — 96366 THER/PROPH/DIAG IV INF ADDON: CPT | Performed by: EMERGENCY MEDICINE

## 2021-07-28 PROCEDURE — 84703 CHORIONIC GONADOTROPIN ASSAY: CPT | Performed by: EMERGENCY MEDICINE

## 2021-07-28 RX ORDER — IOPAMIDOL 755 MG/ML
100 INJECTION, SOLUTION INTRAVASCULAR ONCE
Status: COMPLETED | OUTPATIENT
Start: 2021-07-28 | End: 2021-07-28

## 2021-07-28 RX ORDER — LIDOCAINE 40 MG/G
CREAM TOPICAL
Status: DISCONTINUED | OUTPATIENT
Start: 2021-07-28 | End: 2021-07-29 | Stop reason: HOSPADM

## 2021-07-28 RX ORDER — HYDROMORPHONE HYDROCHLORIDE 2 MG/1
2 TABLET ORAL
Status: DISCONTINUED | OUTPATIENT
Start: 2021-07-28 | End: 2021-07-29 | Stop reason: HOSPADM

## 2021-07-28 RX ORDER — ACETAMINOPHEN 325 MG/1
650 TABLET ORAL EVERY 4 HOURS PRN
Status: DISCONTINUED | OUTPATIENT
Start: 2021-07-28 | End: 2021-07-29 | Stop reason: HOSPADM

## 2021-07-28 RX ORDER — HEPARIN SODIUM 10000 [USP'U]/100ML
0-5000 INJECTION, SOLUTION INTRAVENOUS CONTINUOUS
Status: DISCONTINUED | OUTPATIENT
Start: 2021-07-28 | End: 2021-07-29

## 2021-07-28 RX ORDER — HYDROMORPHONE HYDROCHLORIDE 1 MG/ML
0.5 INJECTION, SOLUTION INTRAMUSCULAR; INTRAVENOUS; SUBCUTANEOUS
Status: DISCONTINUED | OUTPATIENT
Start: 2021-07-28 | End: 2021-07-28

## 2021-07-28 RX ORDER — HYDROMORPHONE HYDROCHLORIDE 1 MG/ML
0.5 INJECTION, SOLUTION INTRAMUSCULAR; INTRAVENOUS; SUBCUTANEOUS
Status: DISCONTINUED | OUTPATIENT
Start: 2021-07-28 | End: 2021-07-29 | Stop reason: HOSPADM

## 2021-07-28 RX ADMIN — SODIUM CHLORIDE 67 ML: 9 INJECTION, SOLUTION INTRAVENOUS at 15:06

## 2021-07-28 RX ADMIN — HYDROMORPHONE HYDROCHLORIDE 2 MG: 2 TABLET ORAL at 23:08

## 2021-07-28 RX ADMIN — IOPAMIDOL 108 ML: 755 INJECTION, SOLUTION INTRAVENOUS at 15:02

## 2021-07-28 RX ADMIN — HEPARIN SODIUM 1800 UNITS/HR: 10000 INJECTION, SOLUTION INTRAVENOUS at 19:29

## 2021-07-28 RX ADMIN — SODIUM CHLORIDE 1000 ML: 9 INJECTION, SOLUTION INTRAVENOUS at 21:47

## 2021-07-28 RX ADMIN — HYDROMORPHONE HYDROCHLORIDE 0.5 MG: 1 INJECTION, SOLUTION INTRAMUSCULAR; INTRAVENOUS; SUBCUTANEOUS at 20:49

## 2021-07-28 ASSESSMENT — MIFFLIN-ST. JEOR: SCORE: 1777.19

## 2021-07-28 NOTE — ED TRIAGE NOTES
Patient sent from providers office. Patient had a ct abdomen pelvis that should a blood clot in her mesenteric vein. Patient had been having stomach pain since last Monday.

## 2021-07-28 NOTE — ED NOTES
Bed: IN04  Expected date:   Expected time:   Means of arrival:   Comments:  Benita Ogden? 48 yof, hx of factor V leiden, has been off lovenox, apparent DVT vs SMV clot, sent by family med and GI, for eval of PE and admit to be seen by Heme in AM     Scot Dias MD  07/28/21 4625

## 2021-07-28 NOTE — ED PROVIDER NOTES
History     Chief Complaint   Patient presents with     Abdominal Pain     HPI  Benita Olivo is a 41 year old female with PMH notable for Factor V Leiden, cervical adenocarcinoma in situ s/p conization of cervix, PE, who presents to the ED with abdominal pain.  Patient reports pain started about a week ago, was initially just mild bloating sensation.  This is progressed over the weekend and she has been minimally eating over the past 4 to 5 days as a result of the pain being worse after eating.  2 days ago she started taking enoxaparin twice daily, which she has otherwise not been taking over most of the past year.  No vomiting, has had mild nausea.  No diarrhea, no bloody nor black stools.  Pain is constant in the upper abdominal midline.  Aggravated with eating, constant but waxes and wanes in severity with aching quality.  She had abdominal ultrasound and CT earlier in the day, was told that she has a blood clot in the abdomen and needed to proceed to the ED.    Past Medical History  Past Medical History:   Diagnosis Date     Abnormal Pap smear of cervix     conization Dr. Fernandez.  f/u , paps normal      ASCUS on Pap smear 2012    + hpv,     BMI 32.0-32.9,adult      DVT of upper extremity (deep vein thrombosis) (H)     right arm      Factor V deficiency (H)     homozygous      Mass     URETHRAl mass      PE (pulmonary embolism)     was on birth control at time, hosp x 4-5 d      Past Surgical History:   Procedure Laterality Date     APPENDECTOMY OPEN CHILD        SECTION N/A 2018    Procedure:  SECTION;  Primary  Section;  Surgeon: Junie Hightower MD;  [facility]      SECTION N/A 2020    Procedure:  SECTION;  Surgeon: Olga Clemente MD;  Location: [facility]     CONIZATION  2012    Procedure: CONIZATION;  Cold Knife [facility]   Choice anesthesia;  Surgeon: Doreen Fernandez MD;  Location: [facility] OR  "    CYSTOSCOPY, EXCISE URETHRAL CARUNCLE, COMBINED  7/24/2012    Procedure: COMBINED CYSTOSCOPY, EXCISE URETHRAL CARUNCLE;  Cystoscopy and Excision of Urethral Mass;  Surgeon: Rasheeda Salazar MD;  Location: [facility] OR     wisdom teeth       enoxaparin (LOVENOX) 100 MG/ML syringe  cholecalciferol (VITAMIN D3) 1000 units (25 mcg) capsule  Prenatal Vit-Fe Fumarate-FA (PRENATAL VITAMIN PO)      Allergies   Allergen Reactions     Misoprostol      Tolerated multiple low doses miso induction for labor. However, one hour after delivery when she received 800 mcg miso, her skin turned blue in color and face pale and had tachycardia. No throat closure or difficulty breathing or chest pain. Quickly resolved with IV Benadryl. Patient satted just fine. Could receive again in the future; however, monitor for this side effect.     Family History  Family History   Problem Relation Age of Onset     Cardiovascular Paternal Grandmother         had aneurism     Blood Disease Sister         factor V     Blood Disease Brother         factor V  brother carry gene     Blood Disease Mother         factor V     Blood Disease Father         factor V     Circulatory Maternal Uncle         uncle with blood clots     Circulatory Paternal Uncle         uncle with blood clot     Social History   Social History     Tobacco Use     Smoking status: Never Smoker     Smokeless tobacco: Never Used   Substance Use Topics     Alcohol use: No     Alcohol/week: 0.0 standard drinks     Comment: rare use if ever     Drug use: No      Past medical history, past surgical history, medications, allergies, family history, and social history were reviewed with the patient. No additional pertinent items.      Review of Systems  A complete review of systems was performed with pertinent positives and negatives noted in the HPI, and all other systems negative.    Physical Exam   BP: 133/88  Pulse: 89  Temp: 97.6  F (36.4  C)  Resp: 20  Height: 180.3 cm (5' 11\")  Weight: " 101.6 kg (224 lb)  SpO2: 100 %    Physical Exam  General: no acute distress. Appears stated age.   HENT: MMM, no oropharyngeal lesions  Eyes: PERRL, normal sclerae  Cardio: regular rate. Regular rhythm. Extremities well perfused  Resp: Normal work of breathing, normal respiratory rate.  Chest/Back: no visual signs of trauma, no CVA tenderness  Abdomen: upper abdominal midline tenderness, non-distended, no rebound, no guarding  Neuro: alert and fully oriented. CN II-XII grossly intact. Grossly normal strength and sensation in all extremities.   MSK: no deformities. Grossly normal ROM in extremities.   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     After the initial assessment, the care team initiated multiple lab tests, initiated IV fluid administration, initiated medication therapy with heparin gtt and consulted with vascular surgery to provide stabilization care. Due to the critical nature of this patient, I reassessed nursing observations, vital signs, physical exam and interpretation of imaging studies and labs multiple times prior to her disposition.     Time also spent performing documentation, reviewing test results, discussion with consultants and coordination of care.     Critical care time (excluding teaching time and procedures): 35 minutes.               Labs Ordered and Resulted from Time of ED Arrival Up to the Time of Departure from the ED   PARTIAL THROMBOPLASTIN TIME - Abnormal; Notable for the following components:       Result Value    aPTT 41 (*)     All other components within normal limits   COMPREHENSIVE METABOLIC PANEL - Abnormal; Notable for the following  components:    AST 47 (*)      (*)     Albumin 3.3 (*)     All other components within normal limits   CBC WITH PLATELETS - Normal   LACTIC ACID WHOLE BLOOD - Normal   HCG QUALITATIVE PREGNANCY   INR   LIPASE   MEASURE WEIGHT   NOTIFY PHYSICIAN   NOTIFY PHYSICIAN   PERIPHERAL IV CATHETER   COVID-19 VIRUS (CORONAVIRUS) BY PCR     No orders to display          Assessments & Plan (with Medical Decision Making)   Patient presenting with abdominal pain and outpatient CT demonstrating mesenteric vein thrombosis. Vitals in the ED unremarkable. Nursing notes reviewed.     Outpatient abdominal ultrasound and CT from earlier in the day reviewed -demonstrates acute venous thrombosis in the superior mesenteric vein at location similar to patient's pain.  Also had some peripancreatic inflammation.  Chart review also notable for note by Dr. Thompson of family medicine, which stated gastroenterology had already been contacted and recommended admission with anticoagulation and evaluation by hematology in the morning.  Upon arrival, patient was placed immediately and heparin drip started.    Labs with mild aminotransferase elevations, unremarkable electrolytes, normal lactate, unremarkable CBC.  Vascular surgery consulted.    The complete clinical picture is most consistent with mesenteric vein thrombosis. After counseling on the diagnosis, work-up, and treatment plan, the patient was admitted to medicine.       Final diagnoses:   Mesenteric vein thrombosis (H)   Abdominal pain, epigastric   Factor V Leiden (H)     New Prescriptions    No medications on file       --  Lucio Smallwood MD   Emergency Medicine   [facility] EMERGENCY DEPARTMENT  7/28/2021     Lucio Smallwood MD  07/28/21 2006

## 2021-07-28 NOTE — TELEPHONE ENCOUNTER
Dr Donna Pereira with Dr Thompson    He had talked with radiology regarding the findings of the CT scan    Mary Carmen Cross RN   [facility]

## 2021-07-28 NOTE — TELEPHONE ENCOUNTER
Choua calling [facility] calling with results                                                                         IMPRESSION:   1. Biliary sludge without acute cholecystitis.  2. Mild hepatomegaly.  3. No Doppler flow appreciated in the lateral aspect of the splenic  vein, concerning for thrombus. Recommend further evaluation with CT.        [Access Center: Suspected splenic vein thrombus]    Will route to PCP Donna

## 2021-07-28 NOTE — TELEPHONE ENCOUNTER
Called radiology after ultrasound showed possible venous clot in biliary vein and CT with contrast was ordered which showed clot in superior mesenteric vein more central and left sided with some possible inflammation of the pancreas and lymph nodes noted.    Sakshi gastroenterology fellow who recommended emergency room evaluation and admission for full anticoagulation and evaluation by hematology in the morning.    Call patient she agreed to come to ER.    Daniel Thompson MD

## 2021-07-28 NOTE — RESULT ENCOUNTER NOTE
Please notify patient: Tried calling, left message -needs CT scan with contrast to rule out biliary vein thrombosis, see today's abdominal ultrasound results.    Thanks Daniel  Triage-please help schedule, needs to get it done today.

## 2021-07-28 NOTE — TELEPHONE ENCOUNTER
Reason for Call:  Other FYI    Detailed comments: Stanley, a radiology resident called in wanting to report what was found during patient's CT scan:    -Acute venous thrombosis in branches in superior mesenteric vein    -Some inflammation around the pancreas that could be related to pancreatitis or acute venous thrombosis, hard to tell from the scan    Phone Number Patient can be reached at: Other phone number:  N/A    Best Time: N/A    Can we leave a detailed message on this number? Not Applicable    Call taken on 7/28/2021 at 4:39 PM by Bart Dow

## 2021-07-29 ENCOUNTER — DOCUMENTATION ONLY (OUTPATIENT)
Dept: ANTICOAGULATION | Facility: CLINIC | Age: 41
End: 2021-07-29

## 2021-07-29 VITALS
HEART RATE: 82 BPM | OXYGEN SATURATION: 96 % | HEIGHT: 71 IN | RESPIRATION RATE: 20 BRPM | BODY MASS INDEX: 31.36 KG/M2 | DIASTOLIC BLOOD PRESSURE: 70 MMHG | TEMPERATURE: 97.6 F | SYSTOLIC BLOOD PRESSURE: 108 MMHG | WEIGHT: 224 LBS

## 2021-07-29 DIAGNOSIS — Z79.01 LONG TERM CURRENT USE OF ANTICOAGULANT THERAPY: ICD-10-CM

## 2021-07-29 DIAGNOSIS — I82.409 DEEP VEIN THROMBOSIS (DVT) (H): Primary | ICD-10-CM

## 2021-07-29 DIAGNOSIS — D68.51 HOMOZYGOUS FACTOR V LEIDEN MUTATION (H): ICD-10-CM

## 2021-07-29 LAB
ANION GAP SERPL CALCULATED.3IONS-SCNC: 5 MMOL/L (ref 3–14)
BUN SERPL-MCNC: 8 MG/DL (ref 7–30)
CALCIUM SERPL-MCNC: 8.8 MG/DL (ref 8.5–10.1)
CHLORIDE BLD-SCNC: 110 MMOL/L (ref 94–109)
CO2 SERPL-SCNC: 25 MMOL/L (ref 20–32)
CREAT SERPL-MCNC: 0.67 MG/DL (ref 0.52–1.04)
GFR SERPL CREATININE-BSD FRML MDRD: >90 ML/MIN/1.73M2
GLUCOSE BLD-MCNC: 87 MG/DL (ref 70–99)
HOLD SPECIMEN: NORMAL
LACTATE SERPL-SCNC: 0.5 MMOL/L (ref 0.7–2)
POTASSIUM BLD-SCNC: 3.4 MMOL/L (ref 3.4–5.3)
SODIUM SERPL-SCNC: 140 MMOL/L (ref 133–144)
UFH PPP CHRO-ACNC: 0.72 IU/ML
UFH PPP CHRO-ACNC: >1.1 IU/ML

## 2021-07-29 PROCEDURE — 85520 HEPARIN ASSAY: CPT | Performed by: INTERNAL MEDICINE

## 2021-07-29 PROCEDURE — 36415 COLL VENOUS BLD VENIPUNCTURE: CPT | Performed by: INTERNAL MEDICINE

## 2021-07-29 PROCEDURE — 36415 COLL VENOUS BLD VENIPUNCTURE: CPT | Performed by: EMERGENCY MEDICINE

## 2021-07-29 PROCEDURE — 83605 ASSAY OF LACTIC ACID: CPT

## 2021-07-29 PROCEDURE — 99214 OFFICE O/P EST MOD 30 MIN: CPT | Performed by: PHYSICIAN ASSISTANT

## 2021-07-29 PROCEDURE — 85520 HEPARIN ASSAY: CPT | Performed by: EMERGENCY MEDICINE

## 2021-07-29 PROCEDURE — 96376 TX/PRO/DX INJ SAME DRUG ADON: CPT | Performed by: EMERGENCY MEDICINE

## 2021-07-29 PROCEDURE — 250N000011 HC RX IP 250 OP 636: Performed by: EMERGENCY MEDICINE

## 2021-07-29 PROCEDURE — 250N000011 HC RX IP 250 OP 636

## 2021-07-29 PROCEDURE — 99239 HOSP IP/OBS DSCHRG MGMT >30: CPT | Performed by: INTERNAL MEDICINE

## 2021-07-29 PROCEDURE — 250N000013 HC RX MED GY IP 250 OP 250 PS 637: Performed by: INTERNAL MEDICINE

## 2021-07-29 PROCEDURE — 80048 BASIC METABOLIC PNL TOTAL CA: CPT

## 2021-07-29 RX ORDER — RIVAROXABAN 15 MG-20MG
15 KIT ORAL 2 TIMES DAILY
Qty: 41 EACH | Refills: 0 | Status: SHIPPED | OUTPATIENT
Start: 2021-07-29 | End: 2022-01-31 | Stop reason: DRUGHIGH

## 2021-07-29 RX ADMIN — HEPARIN SODIUM 1500 UNITS/HR: 10000 INJECTION, SOLUTION INTRAVENOUS at 06:56

## 2021-07-29 RX ADMIN — HYDROMORPHONE HYDROCHLORIDE 0.5 MG: 1 INJECTION, SOLUTION INTRAMUSCULAR; INTRAVENOUS; SUBCUTANEOUS at 02:06

## 2021-07-29 RX ADMIN — RIVAROXABAN 15 MG: 15 TABLET, FILM COATED ORAL at 12:40

## 2021-07-29 RX ADMIN — HYDROMORPHONE HYDROCHLORIDE 0.5 MG: 1 INJECTION, SOLUTION INTRAMUSCULAR; INTRAVENOUS; SUBCUTANEOUS at 06:18

## 2021-07-29 NOTE — CONSULTS
Vascular Surgery Consult Note  2021    Reason for consult: mesenteric vein thrombus  Consult requested by: Dr. Smallwood      ASSESSMENT: 41yoF w/ Factor V Leiden not on anticoagulation with mesenteric vein thrombosis. No signs of bowel ischemia.     RECOMMENDATIONS:    - Agree with admission to medicine  - Heparin gtt   - No surgical intervention    Discussed with staff Dr. Rich Alford MD (PGY-2)  Surgery      =======================    History of Present Illness:    Benita Olivo is a 41 year old female with Factor V Leiden, cervical adenocarcinoma s/p conization, PE, now with new crampy abdominal pain for 1 week. Initially the pain was tolerable, but has consistently worsened in the past week. It is constant, but increases with eating. No particular food seems to be related. Bowel movements regular, last BM today after taking miralax. No hematochezia or melena. No nausea or vomiting, but overall decreased appetite. Denies chest pain, SOB, lightheadedness, dizziness. She has been taking lovenox for the past couple years for Factor V Leiden, but hasn't taken it for a few months. It was difficult for her to keep up with BID shots.     Past Medical History:  Past Medical History:   Diagnosis Date     Abnormal Pap smear of cervix     conization Dr. Fernandez.  f/u , paps normal      ASCUS on Pap smear 2012    + hpv,     BMI 32.0-32.9,adult      DVT of upper extremity (deep vein thrombosis) (H)     right arm      Factor V deficiency (H)     homozygous      Mass     URETHRAl mass      PE (pulmonary embolism)     was on birth control at time, hosp x 4-5 d        Past Surgical History  Past Surgical History:   Procedure Laterality Date     APPENDECTOMY OPEN CHILD        SECTION N/A 2018    Procedure:  SECTION;  Primary  Section;  Surgeon: Junie Hightower MD;  [facility]      SECTION N/A 2020    Procedure:   SECTION;  Surgeon: Olga Clemente MD;  [facility]     CONIZATION  8/8/2012    Procedure: CONIZATION;  Cold Knife [facility]   Choice anesthesia;  Surgeon: Doreen Fernandez MD;  [facility] OR     CYSTOSCOPY, EXCISE URETHRAL CARUNCLE, COMBINED  7/24/2012    Procedure: COMBINED CYSTOSCOPY, EXCISE URETHRAL CARUNCLE;  Cystoscopy and Excision of Urethral Mass;  Surgeon: Rasheeda Salazar MD;  [facility] OR     wisdom teeth         Family History:  Family History   Problem Relation Age of Onset     Cardiovascular Paternal Grandmother         had aneurism     Blood Disease Sister         factor V     Blood Disease Brother         factor V  brother carry gene     Blood Disease Mother         factor V     Blood Disease Father         factor V     Circulatory Maternal Uncle         uncle with blood clots     Circulatory Paternal Uncle         uncle with blood clot       Social History:  Social History     Tobacco Use     Smoking status: Never Smoker     Smokeless tobacco: Never Used   Substance Use Topics     Alcohol use: No     Alcohol/week: 0.0 standard drinks     Comment: rare use if ever     Drug use: No        Medications:  Current Outpatient Medications   Medication Sig Dispense Refill     enoxaparin (LOVENOX) 100 MG/ML syringe Inject 90 mg enoxaparin every 12 hours. 60 Syringe 6     cholecalciferol (VITAMIN D3) 1000 units (25 mcg) capsule Take 1 capsule (1,000 Units) by mouth daily Take one capsule daily. 90 capsule 3     Prenatal Vit-Fe Fumarate-FA (PRENATAL VITAMIN PO)          Allergies:  Allergies   Allergen Reactions     Misoprostol      Tolerated multiple low doses miso induction for labor. However, one hour after delivery when she received 800 mcg miso, her skin turned blue in color and face pale and had tachycardia. No throat closure or difficulty breathing or chest pain. Quickly resolved with IV Benadryl. Patient satted just fine. Could receive again in the future; however, monitor for  this side effect.       Review of Symptoms:  A 10 point review of symptoms has been conducted and is negative except for that mentioned in the above HPI.    Physical Exam:    Temp:  [97.6  F (36.4  C)] 97.6  F (36.4  C)  Pulse:  [89] 89  Resp:  [20] 20  BP: (133)/(88) 133/88  SpO2:  [96 %-100 %] 96 %    Gen: NAD, resting comfortably  HEENT: NCAT, sclera anicteric  CV: RRR  Resp: nonlabored respirations, comfortable on RA  Abd: soft, ntnd, no peritoneal signs  Ext: WWP w/o edema  Neuro: no focal deficits  Skin: No rashes    Labs:  WBC 9.5  Hgb 12.7  Plt 221    INR 1.11  PTT 41    Alk phos 135    AST 47  Tbili 0.6  Lipase 63    Lactate 0.8  Cr 0.68    Imaging:  US RUQ 7/28  MPRESSION:   1. Biliary sludge without acute cholecystitis.  2. Mild hepatomegaly.  3. No Doppler flow appreciated in the lateral aspect of the splenic  vein, concerning for thrombus. Recommend further evaluation with CT.    CTAP 7/28  IMPRESSION:  1. Acute venous thrombosis of the branches of the superior mesenteric  vein predominantly within the central and left side of the abdomen.  There is adjacent reactive lymph nodes and mesenteric edema. The  splenic vein is patent.  2. Peripancreatic inflammation and reactive lymph nodes, focused at  the uncinate process of the pancreas.  This could represent acute  pancreatitis with associated acute thrombosis of the SMV, however  these findings may be related to spontaneous acute thrombosis with  associated venous congestion. No current lipase to correlate with  pancreatitis.  3. Bilateral nonobstructing nephrolithiasis.

## 2021-07-29 NOTE — DISCHARGE INSTRUCTIONS
Summary of Visit       Reason for your hospital stay:  You were hospitalized for a superior mesenteric vein thrombosis, this stabilized with a heparin infusion (this is a blood thinner or anticoagulant medication).  We discussed your situation with Hematology who recommended you begin Xarelto in order to treat this clot and to prevent future clots.  This will be 15mg two times a day for 21 days and then 20mg daily.  Please follow up with Hematology in their clinic.         After Care          Your activity upon discharge: activity as tolerated          Follow this diet upon discharge: Combination Diet Regular Diet Adult         Follow-Up Appointment Instructions          Follow up with Dr. Cruz or Radha at [facility]ematology, within 4 weeks  to evaluate medication change and for hospital follow- up. No follow up labs or test are needed.     Appointments on [place] and/or [facility] (with [facility] or [facility] provider or service). Call [phone] if you haven't heard regarding these appointments within 7 days of discharge.

## 2021-07-29 NOTE — PROGRESS NOTES
ANTICOAGULATION  MANAGEMENT    Benita Olivo is being discharged from the [facility] Anticoagulation Management Program ([facility]).    Reason for discharge: warfarin replaced by alternate therapy, Xarelto    Anticoagulation episode resolved, [facility] referral closed and INR Standing order discontinued    If patient needs warfarin management in the future, please send a new referral    Thais Deng RN

## 2021-07-29 NOTE — DISCHARGE SUMMARY
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 Cruz or Radha at [facility] Hematology, within 4 weeks    to evaluate medication change and for hospital follow- up. No follow up   labs or test are needed.    Appointments on [place] and/or [facility] (with [facility] or [facility]   provider or service). Call [phone] if you haven't heard regarding   these appointments within 7 days of discharge.             Unresulted Labs Ordered in the Past 30 Days of this Admission     No orders found for last 31 day(s).      These results will be followed up by me.    Discharge Disposition   Discharged to home  Condition at discharge: Stable      Hospital Course   Benita Olivo is a 41 year old female w/ PMHx Factor V Leiden (diagnosed 2005 s/p PE) who presents with abdominal pain, found to have large SMV thrombus.    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  Vascular Surgery did not recommend intervention.  Hematology recommended use of xarelto given historical issues with compliance with warfarin and lovenox.  Will need to do starter pack dosing of 15mg BID for 21 days before transitioning to 20mg daily.  -  Prescribed xarelto  -  Followup with Hematology    Consultations This Hospital Stay   PHARMACY IP CONSULT  PHARMACY IP CONSULT  HEMATOLOGY ADULT IP CONSULT    Code Status   Full Code    Time Spent on this Encounter   Beba OLIVEROS  MD John, personally saw the patient today and spent greater than 30 minutes discharging this patient.       Beba Lopez MD  [facility] EMERGENCY DEPARTMENT  [address]  [place] [address]  Phone: [phone]  ______________________________________________________________________    Physical Exam   Vital Signs: Temp: 97.6  F (36.4  C) Temp src: Oral BP: 108/70 Pulse: 82   Resp: 20 SpO2: 96 % O2 Device: None (Room air)    Weight: 224 lbs 0 oz  General Appearance: Sitting in bed in NAD  Respiratory: CTAB  Cardiovascular: RRR, no m/r/g, peripheral pulses intact  GI: NABS, soft, NT, ND  Skin: No rashes  Neuro: Alert, interactive        Primary Care Physician   Physician No Ref-Primary    Discharge Orders      Reason for your hospital stay    You were hospitalized for a superior mesenteric vein thrombosis, this stabilized with a heparin infusion (this is a blood thinner or anticoagulant medication).  We discussed your situation with Hematology who recommended you begin Xarelto in order to treat this clot and to prevent future clots.  This will be 15mg two times a day for 21 days and then 20mg daily.  Please follow up with Hematology in their clinic.     Activity    Your activity upon discharge: activity as tolerated     Adult [facility] Follow-up and recommended labs and tests    Follow up with Dr. Cruz or Radha at [facility] Hematology, within 4 weeks  to evaluate medication change and for hospital follow- up. No follow up labs or test are needed.    Appointments on [place] and/or [facility] (with [facility] or [facility] provider or service). Call [phone] if you haven't heard regarding these appointments within 7 days of discharge.     Full Code     Diet    Follow this diet upon discharge: Orders Placed This Encounter      Combination Diet Regular Diet Adult       Significant Results and Procedures   Most Recent 3 CBC's:Recent Labs   Lab Test 07/28/21  1914 01/14/20  0700 01/13/20  1854  01/10/20  1515 10/29/19  1439   WBC 9.5  --   --  14.3* 12.1*   HGB 12.7 10.0* 10.4* 11.9 12.5   MCV 89  --   --  92 95     --   --  160 168     Most Recent 3 BMP's:Recent Labs   Lab Test 07/29/21  0617 07/28/21  1914 10/05/15  1324    137 141   POTASSIUM 3.4 3.5 4.0   CHLORIDE 110* 107 109   CO2 25 27 22   BUN 8 10 6*   CR 0.67 0.68 0.66   ANIONGAP 5 3 10   ANA 8.8 9.2 8.6   GLC 87 86 81   ,   Results for orders placed or performed during the hospital encounter of 07/28/21   CT Abdomen Pelvis w Contrast     Value    Radiologist flags (Urgent)     Acute venous thrombosis of superior mesenteric vein    Narrative    EXAMINATION: CT ABDOMEN PELVIS W CONTRAST  7/28/2021 3:12 PM      CLINICAL HISTORY: Epigastric pain; US- poss biliary vein thrombus,  factor 5 leyden, chronic lovenox tx; Epigastric pain    COMPARISON: Same day abdominal ultrasound    PROCEDURE COMMENTS: CT of the abdomen was performed with 108mL of  isovue 370 intravenous in the portal venous phase.  Coronal and  sagittal reformatted images were obtained.    FINDINGS:  Pancreas/Vasculature: Mesenteric fat stranding focally adjacent to the  edematous uncinate process of the pancreas. Homogeneous enhancement of  the remainder of the pancreas without focal parenchymal nonenhancement  or peripancreatic fluid collections. Multiple adjacent peripancreatic  mesenteric lymph nodes presumably reactive. Abrupt narrowing in  caliber of the superior mesenteric vein just below the confluence with  the splenic vein on series 4 image 169. Additionally there are long  segments of non-opacified central and left sided branches of the  superior mesenteric vein (series 4 image 209), which demonstrate wall  thickening and are surrounded by fat stranding. Surrounding mesenteric  edema and reactive lymph nodes.    Main portal vein and branches are patent. Splenic vein is patent  without thrombosis.    Lower thorax: Normal.    Liver: No mass. No intrahepatic biliary  ductal dilation.    Biliary System: Normal gallbladder. No extrahepatic biliary ductal  dilation.    Adrenal glands: No mass or nodules    Spleen: Normal.    Kidneys: 4 mm nonobstructing calyceal stone in the upper pole of the  left kidney. Punctate nonobstructing stone in the lower pole of the  right kidney.  Focal cortical hypodensities, too small to characterize  and statistically favored to represent simple cysts.  No  hydronephrosis or hydroureter.    Gastrointestinal tract: Normal appendix. Normal caliber small bowel.   No dilation of small and large bowel loops. No abnormal thickening of  small and large bowel loops. No pneumatosis.    Mesentery/peritoneum/retroperitoneum: No mass. Trace free fluid  midline within the abdomen and tracking into the pelvis.    Lymph nodes: Pulmonary reactive lymph nodes around the predominantly  around the nonopacified branches of the superior mesenteric vein. No  significant lymphadenopathy.    Pelvis: Urinary bladder is normal.  Uterus within normal limits.    Osseous structures: Multilevel degenerative changes of the spine.  There is a likely chronic-appearing compression deformities of T11 and  T12 with superimposed Schmorl nodes.      Soft tissues: Rectus diastases. Otherwise soft tissues are within  normal limits.      Impression    IMPRESSION:  1. Acute venous thrombosis of the branches of the superior mesenteric  vein predominantly within the central and left side of the abdomen.  There is adjacent reactive lymph nodes and mesenteric edema. The  splenic vein is patent.  2. Peripancreatic inflammation and reactive lymph nodes, focused at  the uncinate process of the pancreas.  This could represent acute  pancreatitis with associated acute thrombosis of the SMV, however  these findings may be related to spontaneous acute thrombosis with  associated venous congestion. No current lipase to correlate with  pancreatitis.  3. Bilateral nonobstructing  nephrolithiasis.    [Urgent Result: Acute venous thrombosis of superior mesenteric vein  branches.  Possible acute pancreatitis vs reactive inflammation  secondary to above.]    Finding was identified on 7/28/2021 3:17 PM.     Bart Dow,  was contacted by Dr. Bishop via  telephone communication at 4:50 PM, who verbalized understanding and  will route the message to Dr. Daniel Thompson.    Subsequently findings were communicated to Dr. Thompson via telephone at  4:10 PM by Dr. Bishop, who verbalized understanding.    I have personally reviewed the examination and initial interpretation  and I agree with the findings.    MONA BYERS,          SYSTEM ID:  [id number]       Discharge Medications   Discharge Medication List as of 7/29/2021  1:14 PM      START taking these medications    Details   rivaroxaban ANTICOAGULANT (XARELTO ANTICOAGULANT) 20 MG TABS tablet Take 1 tablet (20 mg) by mouth daily (with dinner), Disp-90 tablet, R-1, E-PrescribeTo begin after doing starter portion for 20.5 days (got dose 1 of xarelto in the hospital)      Rivaroxaban ANTICOAGULANT (XARELTO STARTER PACK ANTICOAGULANT) 15 & 20 MG TBPK Take 15 mg by mouth 2 times daily for 21 days, Disp-41 each, R-0, E-PrescribeTake 15mg two times a day for 21 days and then begin 20mg daily for ongoing DVT prevention.  Please dispense 20.5 days worth of medication as she received her first dose in the  hospital.         CONTINUE these medications which have NOT CHANGED    Details   cholecalciferol (VITAMIN D3) 1000 units (25 mcg) capsule Take 1 capsule (1,000 Units) by mouth daily Take one capsule daily., Disp-90 capsule, R-3, E-Prescribe      Prenatal Vit-Fe Fumarate-FA (PRENATAL VITAMIN PO) Historical         STOP taking these medications       enoxaparin (LOVENOX) 100 MG/ML syringe Comments:   Reason for Stopping:             Allergies   Allergies   Allergen Reactions     Misoprostol      Tolerated multiple low doses miso induction  for labor. However, one hour after delivery when she received 800 mcg miso, her skin turned blue in color and face pale and had tachycardia. No throat closure or difficulty breathing or chest pain. Quickly resolved with IV Benadryl. Patient satted just fine. Could receive again in the future; however, monitor for this side effect.

## 2021-07-29 NOTE — CONSULTS
Hematology Consult Note   Date of Service: 07/29/2021    Patient: Benita Olivo  MRN: [id number]  Admission Date: 7/28/2021  Hospital Day # Hospital Day: 2  Primary Outpatient Hematologist: Dr. Garcia     Reason for Consult: Hx of Factor V leiden, not taking AC for the past year, now with large SMV thrombus, wanting to discuss alternatives to lovenox and coumadin     Assessment & Plan:   Benita Olivo is a 41 year old female with history of recurrent unprovoked UE DVT + PE recommended long-term AC but has been off x 1 year who now presents with large SMV thrombus.  Given that compliance is an issue patient prefers and we recommend tx with a DOAC.  Xarelto would be preferred due to once daily dosing (after starter pack complete, started pack is BID dosing initially).  She also has some liver dysfunction and xarelto is mostly cleared by the kidney.      Recommendations:   -Long-term AC with DOAC (prefer xarelto due to once daily dosing)  -Will request follow-up in bleeding and clotting clinic with Dr. Garcia     Plan of care was discussed with attending physician Dr. Cruz    We will continue to follow this patient. Please don't hesitate to contact the Fellow On-Call with questions.    Taylor Munguia PA-C  [facility]  [phone]      History of Present Illness:    Benita Olivo is a 41 year old female with PMH notable for Factor V Leiden, cervical adenocarcinoma in situ s/p conization of cervix, PE, who presents to the ED with abdominal pain.      Patient reports pain started about a week ago, was initially just mild bloating sensation.  This is progressed over the weekend and she has been minimally eating over the past 4 to 5 days as a result of the pain being worse after eating.      2 days ago she started taking enoxaparin twice daily, which she has otherwise not been taking over most of the past year.  No vomiting, has had mild nausea.  No diarrhea, no bloody nor black stools.  Pain is constant in the  upper abdominal midline.  Aggravated with eating, constant but waxes and wanes in severity with aching quality.  She saw her PCP on 7/26/21 and was recommended stool softener, walking and to let them know if symptoms worsened.  She called 7/27 due to worsened symptoms and US was ordered which showed concern for thrombus in lateral aspect of the splenic vein and CT was done showing acute venous thrombosis of branches of superior mesenteric vein predominantly within the central and left side of abdomen + reactive lymph nodes  She was instructed to go to ED.    In ED high intensity heparin drip started.  Labs showed Labs with mild aminotransferase elevations, unremarkable electrolytes, normal lactate, unremarkable CBC.  Vascular surgery consulted saw her yesterday evening.  They agreed with heparin drip and no surgical intervention (no signs of bowel ischemia).    Currently Benita reports her abdominal pain is improved and she is able to eat some without significant pain.  She denies any pain/swelling in her legs, arms.  No chest pain or SOB.         Hematologic History:  Recurrent UE DVT  PE    2005: 1st clotting event in 2005 with RUE DVT + associated PE (relatively unprovoked, intermittent OCP use)  Found to be homozygous for factor V leiden. Recommended long-term AC but stopped coumadin after 12 months  2012: unprovoked DVT in LUE restarted on coumadin.  Took lovenox during pregnancy x 3     Last seen by Dr. Garcia 11/2019 with plan for annual follow-up         Review of Systems: Pertinent positive and negative systems described in HPI; the remainder of the 14 systems are negative    Past Medical History:  Past Medical History:   Diagnosis Date     Abnormal Pap smear of cervix 2012    conization Dr. Fernandez.  f/u 2013, 2014paps normal      ASCUS on Pap smear 6/2012    + hpv,     BMI 32.0-32.9,adult      DVT of upper extremity (deep vein thrombosis) (H) 2011    right arm      Factor V deficiency (H) 2005     homozygous      Mass     URETHRAl mass      PE (pulmonary embolism)     was on birth control at time, hosp x 4-5 d        Past Surgical History:  Past Surgical History:   Procedure Laterality Date     APPENDECTOMY OPEN CHILD        SECTION N/A 2018    Procedure:  SECTION;  Primary  Section;  Surgeon: Junie Hightower MD;  Location: [facility] L+[facility]      SECTION N/A 2020    Procedure:  SECTION;  Surgeon: Olga Clemente MD;  Location: [facility] L+[facility]     CONIZATION  2012    Procedure: CONIZATION;  Cold Knife [facility]   Choice anesthesia;  Surgeon: Doreen Fernandez MD;  Location: [facility] OR     CYSTOSCOPY, EXCISE URETHRAL CARUNCLE, COMBINED  2012    Procedure: COMBINED CYSTOSCOPY, EXCISE URETHRAL CARUNCLE;  Cystoscopy and Excision of Urethral Mass;  Surgeon: Rasheeda Salazar MD;  Location: [facility] OR     wisdom teeth         Social History:  Social History     Socioeconomic History     Marital status:      Spouse name: Rigo      Number of children: Nai     Years of education: 16     Highest education level: Not on file   Occupational History     Comment:     Tobacco Use     Smoking status: Never Smoker     Smokeless tobacco: Never Used   Substance and Sexual Activity     Alcohol use: No     Alcohol/week: 0.0 standard drinks     Comment: rare use if ever     Drug use: No     Sexual activity: Yes     Partners: Male   Other Topics Concern     Parent/sibling w/ CABG, MI or angioplasty before 65F 55M? Not Asked   Social History Narrative    How much exercise per week? 4-5    How much calcium per day? In multi vits and foods       How much caffeine per day? 3 cups a week    How much vitamin D per day? In foods    Do you/your family wear seatbelts?  Yes    Do you/your family use safety helmets? Yes    Do you/your family use sunscreen? Yes    Do you/your family keep firearms in the home? No    Do you/your family have a smoke  "detector(s)? Yes        Do you feel safe in your home? Yes    Has anyone ever touched you in an unwanted manner? No     Explain      Social Determinants of Health     Financial Resource Strain:      Difficulty of Paying Living Expenses:    Food Insecurity:      Worried About Running Out of Food in the Last Year:      Ran Out of Food in the Last Year:    Transportation Needs:      Lack of Transportation (Medical):      Lack of Transportation (Non-Medical):    Physical Activity:      Days of Exercise per Week:      Minutes of Exercise per Session:    Stress:      Feeling of Stress :    Social Connections:      Frequency of Communication with Friends and Family:      Frequency of Social Gatherings with Friends and Family:      Attends [employer] Services:      Active Member of Clubs or Organizations:      Attends Club or Organization Meetings:      Marital Status:    Intimate Partner Violence:      Fear of Current or Ex-Partner:      Emotionally Abused:      Physically Abused:      Sexually Abused:         Family History  Family History   Problem Relation Age of Onset     Cardiovascular Paternal Grandmother         had aneurism     Blood Disease Sister         factor V     Blood Disease Brother         factor V  brother carry gene     Blood Disease Mother         factor V     Blood Disease Father         factor V     Circulatory Maternal Uncle         uncle with blood clots     Circulatory Paternal Uncle         uncle with blood clot       Outpatient Medications:  [COMPLETED] iopamidol (ISOVUE-370) solution 100 mL    enoxaparin (LOVENOX) 100 MG/ML syringe, Inject 90 mg enoxaparin every 12 hours.  cholecalciferol (VITAMIN D3) 1000 units (25 mcg) capsule, Take 1 capsule (1,000 Units) by mouth daily Take one capsule daily.  Prenatal Vit-Fe Fumarate-FA (PRENATAL VITAMIN PO),          Physical Exam:    /63   Pulse 78   Temp 97.6  F (36.4  C) (Oral)   Resp 20   Ht 1.803 m (5' 11\")   Wt 101.6 kg (224 lb)   LMP " 07/28/2021   SpO2 98%   BMI 31.24 kg/m    Gen: Well appearing, in NAD.  Seen laying in ED bed.   Ext: Warm and well perfused. No lower extremity edema  Skin: No rash, cyanosis or petechial lesion  Neuro: Alert and answering questions appropriately.   Labs & Studies: I personally reviewed the following studies:  ROUTINE LABS (Last four results):  CMP  Recent Labs   Lab 07/29/21  0617 07/28/21 1914    137   POTASSIUM 3.4 3.5   CHLORIDE 110* 107   CO2 25 27   ANIONGAP 5 3   GLC 87 86   BUN 8 10   CR 0.67 0.68   GFRESTIMATED >90 >90   ANA 8.8 9.2   PROTTOTAL  --  7.8   ALBUMIN  --  3.3*   BILITOTAL  --  0.6   ALKPHOS  --  135   AST  --  47*   ALT  --  102*     CBC  Recent Labs   Lab 07/28/21 1914   WBC 9.5   RBC 4.29   HGB 12.7   HCT 38.2   MCV 89   MCH 29.6   MCHC 33.2   RDW 12.3        INR  Recent Labs   Lab 07/28/21 1914   INR 1.11

## 2021-07-29 NOTE — H&P
"[facility]    History and Physical - [employer] Teams, Swing Shift Admission       Date of Admission: 2021     Patient: Benita Olivo      : 1980      MRN: [id number]  PCP: No Ref-Primary, Physician    Chief Complaint   Abdominal pain    History of Present Illness   Benita Oliov is a 41 year old female w/ PMHx Factor V Leiden (diagnosed 2005 s/p PE) who presents with abdominal pain.     History is obtained from the patient    Pt describes gradual onset of pain around one week ago, a \"sore ache\" to the epigastric/periumbilical region that is constant, waxing and waning, though overall worsening. Pain does not radiate to the back, flanks, or groin. It is exacerbated with any PO intake (solids or liquids) and is improved with not eating (pt describes she feels the best in the mornings after not eating/drinking all night). She has also taken Tylenol at home with some relief of her pain, though it has remained persistent. Associates some decreased appetite 2/2 pain. Otherwise, denies any vomiting, diarrhea, constipation, or black/bloody stools. Denies any urinary symptoms such as hematuria, dysuria, frequency/urgency.    Of note, regarding pt's history of Factor V Leiden, she reports historically being on Coumadin and most recently on Lovenox since her last pregnancy. However, she reports that over the course of the past year or so since Covid started, has not consistently taken her Lovenox. She most recently began taking this again four days ago because she had a suspicion that she may need to seek evaluation for her pain and wanted to ensure she was back on coagulation. Denies any dyspnea, cough, chest pain, palpitations, or leg swelling. Denies recent surgical procedures or prolonged immobility.     In the ED, pt was overall hemodynamically stable, afebrile, HR in the 80's, 's/80's. She was found to have some upper abdominal tenderness with an otherwise " benign physical examination. Laboratory evaluation was grossly unremarkable including no leukocytosis, no anemia, no significant electrolyte abnormalities, baseline renal function, lactate not elevated, lipase not elevated. She had US and CT imaging of the abdomen performed showing an acute venous thrombus of the superior mesenteric vein (particularly left and central abdominal branches) w/ adjacent reactive mesenteric lymph nodes and mesenteric edema. She was given fluids, pain control, and started on Heparin.    Review of Systems   (All of the below are negative except as indicated in bold)  Constitutional: Fever, chills, dizziness, light-headedness, syncope, headache  Eyes: Pain, vision changes  ENT: Rhinorrhea, congestion, sore throat, neck pain  CV: Chest pain, palpitations  Resp: Shortness of breath, cough, sputum production  Abd: Decreased appetite, abdominal pain, vomiting, diarrhea, constipation, black/bloody stools  : Hematuria, dysuria, frequency, urgency  MSK: Back pain, arthralgias, myalgias, leg pain  Neuro: Speech changes, focal extremity numbness, focal extremity weakness  Skin: Rashes, lesions    Allergies     Allergies   Allergen Reactions     Misoprostol      Tolerated multiple low doses miso induction for labor. However, one hour after delivery when she received 800 mcg miso, her skin turned blue in color and face pale and had tachycardia. No throat closure or difficulty breathing or chest pain. Quickly resolved with IV Benadryl. Patient satted just fine. Could receive again in the future; however, monitor for this side effect.       Medications   [COMPLETED] iopamidol (ISOVUE-370) solution 100 mL  sodium chloride 0.9 % bag 500mL for CT scan flush use    enoxaparin (LOVENOX) 100 MG/ML syringe, Inject 90 mg enoxaparin every 12 hours.  cholecalciferol (VITAMIN D3) 1000 units (25 mcg) capsule, Take 1 capsule (1,000 Units) by mouth daily Take one capsule daily.  Prenatal Vit-Fe Fumarate-FA  (PRENATAL VITAMIN PO),         Past Medical History       Past Medical History:   Diagnosis Date     Abnormal Pap smear of cervix     conization Dr. Fernandez.  f/u , 2014paps normal      ASCUS on Pap smear 2012    + hpv,     BMI 32.0-32.9,adult      DVT of upper extremity (deep vein thrombosis) (H)     right arm      Factor V deficiency (H)     homozygous      Mass     URETHRAl mass      PE (pulmonary embolism)     was on birth control at time, hosp x 4-5 d        Past Surgical History       Past Surgical History:   Procedure Laterality Date     APPENDECTOMY OPEN CHILD        SECTION N/A 2018    Procedure:  SECTION;  Primary  Section;  Surgeon: Junie Hightower MD;  Location: [facility] L[facility]      SECTION N/A 2020    Procedure:  SECTION;  Surgeon: Olga Clemente MD;  Location: [facility] L+D     CONIZATION  2012    Procedure: CONIZATION;  Cold Knife Cone   Choice anesthesia;  Surgeon: Doreen Fernandez MD;  Location: [facility] OR     CYSTOSCOPY, EXCISE URETHRAL CARUNCLE, COMBINED  2012    Procedure: COMBINED CYSTOSCOPY, EXCISE URETHRAL CARUNCLE;  Cystoscopy and Excision of Urethral Mass;  Surgeon: Rasheeda Salazar MD;  Location: [facility] OR     wisdom teeth         Family History     Family History   Problem Relation Age of Onset     Cardiovascular Paternal Grandmother         had aneurism     Blood Disease Sister         factor V     Blood Disease Brother         factor V  brother carry gene     Blood Disease Mother         factor V     Blood Disease Father         factor V     Circulatory Maternal Uncle         uncle with blood clots     Circulatory Paternal Uncle         uncle with blood clot       Social History     Social History     Socioeconomic History     Marital status:      Spouse name: Rigo      Number of children: 1     Years of education: 16     Highest education level: Not on file   Occupational History      "Comment:     Tobacco Use     Smoking status: Never Smoker     Smokeless tobacco: Never Used   Substance and Sexual Activity     Alcohol use: No     Alcohol/week: 0.0 standard drinks     Comment: rare use if ever     Drug use: No     Sexual activity: Yes     Partners: Male   Other Topics Concern     Parent/sibling w/ CABG, MI or angioplasty before 65F 55M? Not Asked   Social History Narrative    How much exercise per week? 4-5    How much calcium per day? In multi vits and foods       How much caffeine per day? 3 cups a week    How much vitamin D per day? In foods    Do you/your family wear seatbelts?  Yes    Do you/your family use safety helmets? Yes    Do you/your family use sunscreen? Yes    Do you/your family keep firearms in the home? No    Do you/your family have a smoke detector(s)? Yes        Do you feel safe in your home? Yes    Has anyone ever touched you in an unwanted manner? No     Explain      Social Determinants of Health     Financial Resource Strain:      Difficulty of Paying Living Expenses:    Food Insecurity:      Worried About Running Out of Food in the Last Year:      Ran Out of Food in the Last Year:    Transportation Needs:      Lack of Transportation (Medical):      Lack of Transportation (Non-Medical):    Physical Activity:      Days of Exercise per Week:      Minutes of Exercise per Session:    Stress:      Feeling of Stress :    Social Connections:      Frequency of Communication with Friends and Family:      Frequency of Social Gatherings with Friends and Family:      Attends [employer] Services:      Active Member of Clubs or Organizations:      Attends Club or Organization Meetings:      Marital Status:    Intimate Partner Violence:      Fear of Current or Ex-Partner:      Emotionally Abused:      Physically Abused:      Sexually Abused:          Physical Exam   /88   Pulse 89   Temp 97.6  F (36.4  C) (Oral)   Resp 20   Ht 1.803 m (5' 11\")   Wt 101.6 kg (224 " lb)   LMP 07/28/2021   SpO2 96%   BMI 31.24 kg/m    Wt Readings from Last 2 Encounters:   07/28/21 101.6 kg (224 lb)   07/26/21 100.4 kg (221 lb 5 oz)       General: No acute distress, well-appearing, pleasant and conversational  HEENT: No conjunctival erythema, no scleral icterus, MMM  CV: RRR, no murmurs  Lungs: On room air, CTA b/l, no wheezes or crackles, no increased WOB  Abd: Soft, non-tender throughout all quadrants, non-distended, bowel sounds present  Ext: 2+ radial pulses b/l, no lower extremity edema, no calf tenderness  Skin: No visualized rashes or lesions to the abdomen, upper extremities, lower extremities  Neuro: Awake, alert, strength/sensation grossly intact b/l upper and lower extremities      Data     Recent Labs   Lab 07/28/21 1914   WBC 9.5   HGB 12.7   MCV 89      INR 1.11      POTASSIUM 3.5   CHLORIDE 107   CO2 27   BUN 10   CR 0.68   ANIONGAP 3   ANA 9.2   GLC 86   ALBUMIN 3.3*   PROTTOTAL 7.8   BILITOTAL 0.6   ALKPHOS 135   *   AST 47*   LIPASE 63*   Lactate 0.8      Imaging/procedure:  US Abdomen 7/28/2021:  IMPRESSION:   1. Biliary sludge without acute cholecystitis.  2. Mild hepatomegaly.  3. No Doppler flow appreciated in the lateral aspect of the splenic  vein, concerning for thrombus. Recommend further evaluation with CT.    CT Abdomen/Pelvis 7/28/21:  IMPRESSION:  1. Acute venous thrombosis of the branches of the superior mesenteric  vein predominantly within the central and left side of the abdomen.  There is adjacent reactive lymph nodes and mesenteric edema. The  splenic vein is patent.  2. Peripancreatic inflammation and reactive lymph nodes, focused at  the uncinate process of the pancreas.  This could represent acute  pancreatitis with associated acute thrombosis of the SMV, however  these findings may be related to spontaneous acute thrombosis with  associated venous congestion. No current lipase to correlate with  pancreatitis.  3. Bilateral  nonobstructing nephrolithiasis.      Assessment/Plan   Benita Olivo is a 41 year old female with PMHx Factor V Leiden (not on regular anticoagulation for the past year) admitted on 7/28/21 with abdominal pain found to have a superior mesenteric vein thrombus. She is overall afebrile and hemodynamically stable, normotensive without tachycardia. Clinically she is well-appearing with a benign abdominal exam without tenderness on my examination, lactate not elevated. Pain is well-managed on Dilaudid. She has been started on Heparin and will require admission for further monitoring and for further discussion with Heme and Vascular Surgery.    #Acute Abdominal Pain likely 2/2 Acute Superior Mesenteric Vein Thrombosis  Context of a known hypercoagulability syndrome with suboptimal adherence to LVX for the past year. Low suspicion for mesenteric ischemia given manageable pain, benign abdominal exam, lactate non-elevated, overall hemodynamically stable.  Plan:  - Continue Heparin for now  - Dilaudid PO prn for pain management, IV prn available if needed  - Consider Vascular Surgery consultation in the am regarding utility of further diagnostics or procedural interventions for SMV thrombosis  - If decompensates, consider repeating Lactate and discussing with Vascular    #History of Factor V Leiden  #Prior PE  Diagnosed in 2005 following a PE. Has been on Warfarin historically, transitioned to Lovenox since her most recent pregnancy. However, as above, has had suboptimal adherence to LVX over the past year attributed to Covid stressors and hoping to avoid clinic visits. No dyspnea, cough, chest pain, palpitations, or lower extremity pain. Overall hemodynamically stable, satting well on room air. Low suspicion for further venous thromboembolism at this time other than the aforementioned. Pt hoping to transition to an oral anticoagulation alternative following this admission, discussed DOAC's though pt would like for Heme to  weigh in as she has seen them in the past.  Plan:  - On Heparin as above  - Consider Heme consult in the am      FEN: No IVF, regular diet  Prophylaxis:   -GI: None   -VTE: On Heparin  LDA: PIVx1  Code: Full  Dispo: Floor      Pt staffed with attending physician, Dr. Pina.    Sal Girard MD  Internal Medicine, PGY-1  Pager: [phone]

## 2021-08-16 ENCOUNTER — TELEPHONE (OUTPATIENT)
Dept: HEMATOLOGY | Facility: CLINIC | Age: 41
End: 2021-08-16

## 2021-08-16 DIAGNOSIS — K55.069 SUPERIOR MESENTERIC VEIN THROMBOSIS (H): Primary | ICD-10-CM

## 2021-09-25 ENCOUNTER — HEALTH MAINTENANCE LETTER (OUTPATIENT)
Age: 41
End: 2021-09-25

## 2022-01-31 ENCOUNTER — TELEPHONE (OUTPATIENT)
Dept: HEMATOLOGY | Facility: CLINIC | Age: 42
End: 2022-01-31
Payer: COMMERCIAL

## 2022-01-31 DIAGNOSIS — K55.069 MESENTERIC VEIN THROMBOSIS (H): ICD-10-CM

## 2022-01-31 NOTE — CONFIDENTIAL NOTE
Scheduled on 4/4 for annual follow up.      Alexus Griffin  MSN, RN, PHN  [facility]  Office: [phone]  Fax: [phone]

## 2022-04-04 ENCOUNTER — OFFICE VISIT (OUTPATIENT)
Dept: HEMATOLOGY | Facility: CLINIC | Age: 42
End: 2022-04-04
Attending: INTERNAL MEDICINE
Payer: COMMERCIAL

## 2022-04-04 VITALS
HEART RATE: 74 BPM | SYSTOLIC BLOOD PRESSURE: 111 MMHG | RESPIRATION RATE: 16 BRPM | OXYGEN SATURATION: 96 % | TEMPERATURE: 98.1 F | HEIGHT: 71 IN | WEIGHT: 232 LBS | DIASTOLIC BLOOD PRESSURE: 74 MMHG | BODY MASS INDEX: 32.48 KG/M2

## 2022-04-04 DIAGNOSIS — K55.069 MESENTERIC VEIN THROMBOSIS (H): Primary | ICD-10-CM

## 2022-04-04 PROCEDURE — 99215 OFFICE O/P EST HI 40 MIN: CPT | Performed by: INTERNAL MEDICINE

## 2022-04-04 PROCEDURE — G0463 HOSPITAL OUTPT CLINIC VISIT: HCPCS

## 2022-04-04 ASSESSMENT — PAIN SCALES - GENERAL: PAINLEVEL: NO PAIN (0)

## 2022-04-04 NOTE — PROGRESS NOTES
[facility]  [facility]  [facility], [address], [place] [address]  Main: [phone], Fax: [phone]        Outpatient Clinic Visit  Date:  04/04/2022    Benita Olivo is a 41-year-old woman with a history of recurrent and unprovoked venous thromboembolism who is homozygous for factor V Leiden.  We last saw her in November 2019.  She is here to discuss long-term anticoagulation.    Her thrombosis history is as follows:  2005 -- Right upper extremity DVT and pulmonary emboli, relatively unprovoked, had been on oral estrogen x 2 years prior, subsequently discontinued.   2006 -- Diagnosed with homozygous factor V Leiden mutation, recommended consideration of long term anticoagulation, but did not remain on anticoagulation  2012 -- Unprovoked left upper extremity DVT, resumed anticoagulation     Since resuming long-term anticoagulation in 2012, she has struggled with consistent adherence.  She transitioned to enoxaparin during pregnancies.  Following her last delivery in January 2020, she remained on enoxaparin for breast-feeding but also struggled with adherence to this.    She presented in July 2021 with abdominal pain for a few days.  She had been off anticoagulation for approximately 1 year.  CT scan showed a superior mesenteric vein thrombosis.  She was started on rivaroxaban and her symptoms improved quickly.  She has had no recurrent abdominal symptoms since that time.    Overall she is doing well on rivaroxaban and notes that this is much easier to take than warfarin.  She is having no bleeding issues.    Physical exam: She looks well.  Detailed exam not performed today.    Labs: No new labs were obtained as part of today's visit.      ASSESSMENT / PLAN:  1.  Recurrent / unprovoked venous thromboembolism  2.  Homozygous factor V Leiden    As outlined above, Benita has a long history of recurrent and unprovoked venous thromboembolism beginning at  age 25.  Most recently, she presented in July 2021 with a superior mesenteric vein thrombosis.  She had been off anticoagulation for about a year prior to that.    She is now doing well on rivaroxaban and feels confident that she will be able to maintain strict adherence to this compared with warfarin and enoxaparin which she used previously.  She understands the importance of taking this medication at 24-hour dosing intervals and also ideally with food to ensure proper absorption.  We also discussed that without maintaining consistent therapeutic anticoagulation, she is at high risk for recurrent thrombosis given her history and underlying high risk genetic thrombophilia.    The symptoms associated with her SMV clot resolved quickly after starting anticoagulation and she has had no recurrent abdominal symptoms since that time.  We discussed whether a follow-up CT scan to document resolution of the clot should be obtained.  She will look into insurance coverage for this and let us know if she wishes to proceed with the scan.    We should continue to evaluate her anticoagulation plan at least annually.  In the meantime, she will call with any new questions or concerns.      Total time 40 minutes, including review of medical records and labs, clinic visit, and documentation.      Reed Garcia MD  Professor of Medicine  Division of Hematology, Oncology, and Transplantation  Director, [facility]

## 2022-05-01 ENCOUNTER — HEALTH MAINTENANCE LETTER (OUTPATIENT)
Age: 42
End: 2022-05-01

## 2022-05-30 NOTE — PROGRESS NOTES
Left message for Benita to call us about restarting her warfarin.      Addendum 9/18/18  Left message for Benita to call us. Need to know about restarting warfarin  Cali Wade RP   Pt rested well between cares overnight.  No reports of pain. Urinating okay and tolerating IV rocephin.  No acute events occurred overnight.    Pt currently resting in bed watching tv.

## 2022-10-05 NOTE — TELEPHONE ENCOUNTER
RN received a phone call from Benita Olivo, who was recently admitted to [facility] for SMV thrombosis and started on Xarelto. She reports she is up in [place] and forgot her Xarelto medication. She is wondering if we can send a new prescription to [facility] in [place]. She will be home on Thursday so she just needs 6 tablets of the 15 mg of Xarelto. RN reviewed with Dr. Garcia who was okay to send new prescription. She has our call back number for any further questions, comments or concerns.     Alexus Griffin, MSN, RN, PHN - Nurse Clinician - [facility] - [phone]         negative...

## 2022-12-19 NOTE — PROGRESS NOTES
ANTICOAGULATION FOLLOW-UP CLINIC VISIT    Patient Name:  Benita Olivo  Date:  6/9/2017  Contact Type:  Telephone    SUBJECTIVE:        OBJECTIVE    INR   Date Value Ref Range Status   06/09/2017 2.70 (H) 0.86 - 1.14 Final     Comment:     This test is intended for monitoring Coumadin therapy.  Results are not   accurate   in patients with prolonged INR due to factor deficiency.         ASSESSMENT / PLAN  INR assessment THER    Recheck INR In: 1 WEEK    INR Location Clinic      Anticoagulation Summary as of 6/9/2017     INR goal 2.0-3.0   Today's INR 2.70   Maintenance plan No maintenance plan   Full instructions 6/9: 12.5 mg; 6/10: 10 mg; 6/11: 10 mg; 6/12: 10 mg; 6/13: 15 mg; 6/14: 10 mg; 6/15: 10 mg   Plan last modified Christy Santana RN (6/5/2017)   Next INR check 6/16/2017   Priority INR   Target end date     Indications   Deep vein thrombosis (DVT) (H) [I82.409] [I82.409]  Long-term (current) use of anticoagulants [Z79.01] [Z79.01]  Homozygous Factor V Leiden mutation (H) (Resolved) [D68.52]         Anticoagulation Episode Summary     INR check location     Preferred lab     Send INR reminders to [facility]    Comments patient contact number [phone]   name Rigo. Prefers Fridays for lab days and as infrequently as possible.      Anticoagulation Care Providers     Provider Role Specialty Phone number    Reed Garcia MD Responsible Hematology [phone]            See the Encounter Report to view Anticoagulation Flowsheet and Dosing Calendar (Go to Encounters tab in chart review, and find the Anticoagulation Therapy Visit)    Left message for patient with results and dosing recommendations. Asked patient to call back to report any missed doses, falls, signs and symptoms of bleeding or clotting, any changes in health, medication, or diet. Asked patient to call back with any questions or concerns.      Olga Young RN                Carac Counseling:  I discussed with the patient the risks of Carac including but not limited to erythema, scaling, itching, weeping, crusting, and pain.

## 2022-12-26 ENCOUNTER — HEALTH MAINTENANCE LETTER (OUTPATIENT)
Age: 42
End: 2022-12-26

## 2023-04-03 PROBLEM — O36.63X0 MATERNAL CARE FOR EXCESSIVE FETAL GROWTH, THIRD TRIMESTER, NOT APPLICABLE OR UNSPECIFIED: Status: ACTIVE | Noted: 2020-01-03

## 2023-04-10 ENCOUNTER — VIRTUAL VISIT (OUTPATIENT)
Dept: HEMATOLOGY | Facility: CLINIC | Age: 43
End: 2023-04-10
Attending: INTERNAL MEDICINE
Payer: COMMERCIAL

## 2023-04-10 DIAGNOSIS — K55.069 MESENTERIC VEIN THROMBOSIS (H): ICD-10-CM

## 2023-04-10 PROCEDURE — G0463 HOSPITAL OUTPT CLINIC VISIT: HCPCS | Mod: PN,GT | Performed by: INTERNAL MEDICINE

## 2023-04-10 PROCEDURE — 99215 OFFICE O/P EST HI 40 MIN: CPT | Mod: VID | Performed by: INTERNAL MEDICINE

## 2023-04-11 NOTE — PROGRESS NOTES
[facility]  [facility]  [address], [address], [place] [address]  Main: [phone], Fax: [phone]        Outpatient Clinic Visit  Date:  04/10/2023    NOTE:  This visit was conducted by video, with the patient's approval.  Patient location: Home  Provider location: Onsite      Benita Olivo is a 42-year-old woman with a history of recurrent and unprovoked venous thromboembolism who is homozygous for factor V Leiden.  We last saw her in April 2022.  Today's visit was to discuss long-term anticoagulation.    Her thrombosis history is as follows:  2005 -- Right upper extremity DVT and pulmonary emboli, relatively unprovoked, had been on oral estrogen x 2 years prior, subsequently discontinued.   2006 -- Diagnosed with homozygous factor V Leiden mutation, recommended consideration of long term anticoagulation, but did not remain on anticoagulation  2012 -- Unprovoked left upper extremity DVT, resumed anticoagulation  2021 -- Superior mesenteric vein thrombosis (see details below)     Since resuming long-term anticoagulation in 2012 with warfarin, she struggled with consistent adherence.  She transitioned to enoxaparin during pregnancies.  Following her last delivery in January 2020, she remained on enoxaparin for breast-feeding but also struggled with adherence to this.    She presented in July 2021 with abdominal pain for a few days.  She had been off anticoagulation for approximately 1 year.  CT scan showed a superior mesenteric vein thrombosis.  She was started on rivaroxaban and her symptoms improved quickly.  She has had no recurrent abdominal symptoms since that time.      Interval history:  Since her last visit a year ago, she reports that she has remained well.  She continues to tolerate rivaroxaban without any difficulty.  She denies any bleeding issues.  Her overall health has remained good, and she does not anticipate any surgical or  invasive procedures in the coming year.      Physical exam:   She looks well.  Detailed exam not performed today (video visit).    Labs:   No new labs were obtained as part of today's visit.      ASSESSMENT / PLAN:  1.  Recurrent / unprovoked venous thromboembolism  2.  Homozygous factor V Leiden    Benita has a long history of recurrent and unprovoked venous thromboembolism beginning at age 25.  Most recently, she presented in July 2021 with a superior mesenteric vein thrombosis.  She had been off anticoagulation for about a year prior to that.    She clearly needs long-term anticoagulation.  She is doing well on rivaroxaban which we will continue.  She understands the importance of taking this medication at 24-hour dosing intervals and also ideally with food to ensure proper absorption.  We also discussed that without maintaining consistent therapeutic anticoagulation, she is at high risk for recurrent thrombosis given her history and underlying high risk genetic thrombophilia.    The symptoms associated with her SMV clot resolved quickly after starting anticoagulation and she has had no recurrent abdominal symptoms since that time.  At her last visit, we discussed whether a follow-up CT scan should be obtained, but this has not yet been done.  Given  that her symptoms improved quickly, and the absence of any residual symptoms, we will hold off on this.    We should continue to see her at least annually to reevaluate her long-term anticoagulation plan.  She will call with any new questions or concerns in the meanwhile.      Total time 40 minutes, including review of medical records and labs, video visit, and documentation.      Reed Garcia MD  Professor of Medicine  Division of Hematology, Oncology, and Transplantation  Director, [facility]

## 2023-06-02 ENCOUNTER — HEALTH MAINTENANCE LETTER (OUTPATIENT)
Age: 43
End: 2023-06-02

## 2023-12-18 ENCOUNTER — APPOINTMENT (OUTPATIENT)
Dept: GENERAL RADIOLOGY | Facility: CLINIC | Age: 43
End: 2023-12-18
Attending: EMERGENCY MEDICINE
Payer: COMMERCIAL

## 2023-12-18 ENCOUNTER — HOSPITAL ENCOUNTER (EMERGENCY)
Facility: CLINIC | Age: 43
Discharge: HOME | End: 2023-12-18
Attending: EMERGENCY MEDICINE | Admitting: EMERGENCY MEDICINE
Payer: COMMERCIAL

## 2023-12-18 ENCOUNTER — NURSE TRIAGE (OUTPATIENT)
Dept: FAMILY MEDICINE | Facility: CLINIC | Age: 43
End: 2023-12-18
Payer: COMMERCIAL

## 2023-12-18 VITALS
RESPIRATION RATE: 15 BRPM | HEIGHT: 71 IN | HEART RATE: 106 BPM | TEMPERATURE: 99.2 F | SYSTOLIC BLOOD PRESSURE: 138 MMHG | BODY MASS INDEX: 31.36 KG/M2 | WEIGHT: 224 LBS | DIASTOLIC BLOOD PRESSURE: 80 MMHG | OXYGEN SATURATION: 98 %

## 2023-12-18 DIAGNOSIS — J18.9 PNEUMONIA OF BOTH LUNGS DUE TO INFECTIOUS ORGANISM, UNSPECIFIED PART OF LUNG: ICD-10-CM

## 2023-12-18 LAB
ALBUMIN SERPL BCG-MCNC: 3.3 G/DL (ref 3.5–5.2)
ALP SERPL-CCNC: ABNORMAL U/L
ALT SERPL W P-5'-P-CCNC: ABNORMAL U/L
ANION GAP SERPL CALCULATED.3IONS-SCNC: 12 MMOL/L (ref 7–15)
AST SERPL W P-5'-P-CCNC: ABNORMAL U/L
BASOPHILS # BLD AUTO: 0.1 10E3/UL (ref 0–0.2)
BASOPHILS NFR BLD AUTO: 0 %
BILIRUB SERPL-MCNC: 0.8 MG/DL
BUN SERPL-MCNC: 6.1 MG/DL (ref 6–20)
CALCIUM SERPL-MCNC: 8.9 MG/DL (ref 8.6–10)
CHLORIDE SERPL-SCNC: 99 MMOL/L (ref 98–107)
CREAT SERPL-MCNC: 0.58 MG/DL (ref 0.51–0.95)
DEPRECATED HCO3 PLAS-SCNC: 24 MMOL/L (ref 22–29)
EGFRCR SERPLBLD CKD-EPI 2021: >90 ML/MIN/1.73M2
EOSINOPHIL # BLD AUTO: 0.1 10E3/UL (ref 0–0.7)
EOSINOPHIL NFR BLD AUTO: 0 %
ERYTHROCYTE [DISTWIDTH] IN BLOOD BY AUTOMATED COUNT: 12.7 % (ref 10–15)
FLUAV RNA SPEC QL NAA+PROBE: NEGATIVE
FLUBV RNA RESP QL NAA+PROBE: NEGATIVE
GLUCOSE SERPL-MCNC: 116 MG/DL (ref 70–99)
HCT VFR BLD AUTO: 37.4 % (ref 35–47)
HGB BLD-MCNC: 12.1 G/DL (ref 11.7–15.7)
HOLD SPECIMEN: NORMAL
HOLD SPECIMEN: NORMAL
IMM GRANULOCYTES # BLD: 0.2 10E3/UL
IMM GRANULOCYTES NFR BLD: 1 %
LYMPHOCYTES # BLD AUTO: 1.9 10E3/UL (ref 0.8–5.3)
LYMPHOCYTES NFR BLD AUTO: 11 %
MCH RBC QN AUTO: 30.5 PG (ref 26.5–33)
MCHC RBC AUTO-ENTMCNC: 32.4 G/DL (ref 31.5–36.5)
MCV RBC AUTO: 94 FL (ref 78–100)
MONOCYTES # BLD AUTO: 1.2 10E3/UL (ref 0–1.3)
MONOCYTES NFR BLD AUTO: 7 %
NEUTROPHILS # BLD AUTO: 13.6 10E3/UL (ref 1.6–8.3)
NEUTROPHILS NFR BLD AUTO: 81 %
NRBC # BLD AUTO: 0 10E3/UL
NRBC BLD AUTO-RTO: 0 /100
NT-PROBNP SERPL-MCNC: 65 PG/ML (ref 0–450)
PLATELET # BLD AUTO: 224 10E3/UL (ref 150–450)
POTASSIUM SERPL-SCNC: 4.6 MMOL/L (ref 3.4–5.3)
PROT SERPL-MCNC: 7.6 G/DL (ref 6.4–8.3)
RBC # BLD AUTO: 3.97 10E6/UL (ref 3.8–5.2)
RSV RNA SPEC NAA+PROBE: NEGATIVE
SARS-COV-2 RNA RESP QL NAA+PROBE: NEGATIVE
SODIUM SERPL-SCNC: 135 MMOL/L (ref 135–145)
WBC # BLD AUTO: 17 10E3/UL (ref 4–11)

## 2023-12-18 PROCEDURE — 99284 EMERGENCY DEPT VISIT MOD MDM: CPT | Performed by: EMERGENCY MEDICINE

## 2023-12-18 PROCEDURE — 87637 SARSCOV2&INF A&B&RSV AMP PRB: CPT | Performed by: EMERGENCY MEDICINE

## 2023-12-18 PROCEDURE — 36415 COLL VENOUS BLD VENIPUNCTURE: CPT | Performed by: EMERGENCY MEDICINE

## 2023-12-18 PROCEDURE — 99284 EMERGENCY DEPT VISIT MOD MDM: CPT | Mod: 25 | Performed by: EMERGENCY MEDICINE

## 2023-12-18 PROCEDURE — 83880 ASSAY OF NATRIURETIC PEPTIDE: CPT | Performed by: EMERGENCY MEDICINE

## 2023-12-18 PROCEDURE — 85025 COMPLETE CBC W/AUTO DIFF WBC: CPT | Performed by: EMERGENCY MEDICINE

## 2023-12-18 PROCEDURE — 71046 X-RAY EXAM CHEST 2 VIEWS: CPT | Mod: 26 | Performed by: RADIOLOGY

## 2023-12-18 PROCEDURE — 71046 X-RAY EXAM CHEST 2 VIEWS: CPT

## 2023-12-18 PROCEDURE — 84155 ASSAY OF PROTEIN SERUM: CPT | Performed by: EMERGENCY MEDICINE

## 2023-12-18 RX ORDER — AZITHROMYCIN 250 MG/1
TABLET, FILM COATED ORAL
Qty: 6 TABLET | Refills: 0 | Status: SHIPPED | OUTPATIENT
Start: 2023-12-18 | End: 2023-12-23

## 2023-12-18 RX ORDER — ALBUTEROL SULFATE 90 UG/1
1-2 AEROSOL, METERED RESPIRATORY (INHALATION) EVERY 6 HOURS PRN
Qty: 18 G | Refills: 0 | Status: SHIPPED | OUTPATIENT
Start: 2023-12-18

## 2023-12-18 RX ORDER — CODEINE PHOSPHATE AND GUAIFENESIN 10; 100 MG/5ML; MG/5ML
2 SOLUTION ORAL EVERY 6 HOURS PRN
Qty: 118 ML | Refills: 0 | Status: SHIPPED | OUTPATIENT
Start: 2023-12-18

## 2023-12-18 ASSESSMENT — ACTIVITIES OF DAILY LIVING (ADL): ADLS_ACUITY_SCORE: 35

## 2023-12-18 NOTE — ED TRIAGE NOTES
Coming in with productive cough since last Monday. States color was brown and now yellow.T-max 100.4.     Triage Assessment (Adult)       Row Name 12/18/23 0910          Triage Assessment    Airway WDL WDL        Respiratory WDL    Respiratory WDL WDL;X;rhythm/pattern     Rhythm/Pattern, Respiratory shortness of breath        Skin Circulation/Temperature WDL    Skin Circulation/Temperature WDL WDL        Cardiac WDL    Cardiac WDL WDL

## 2023-12-18 NOTE — ED PROVIDER NOTES
[place] EMERGENCY DEPARTMENT ([facility])    23       ED PROVIDER NOTE   Vertical Triage B 9:33 AM   History     Chief Complaint   Patient presents with    Shortness of Breath     The history is provided by the patient and medical records.     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  Symptoms started with a productive cough that started on 2023.  Sputum color was brown initially, and is now yellow. She has had some fevers, temperature as high as 100.4  F. She tried cough suppressants and also was not helping so she stopped.  As of 3 days ago she was still producing phlegm but not as much.  She notes that she was feeling more and more short of breath to the point where she is unable to sleep laying flat in bed and has to sleep upright in a chair. No history of heart disease or heart failure.  No leg swelling. Patient did test at home this morning for COVID-19, was negative.  She is a nonsmoker.     Per [facility] records, she has not had any COVID vaccinations.  No recent flu shots.    Past Medical History  Past Medical History:   Diagnosis Date    Abnormal Pap smear of cervix     conization Dr. Fernandez.  f/u , paps normal     ASCUS on Pap smear 2012    + hpv,    BMI 32.0-32.9,adult     DVT of upper extremity (deep vein thrombosis) (H)     right arm     Factor V deficiency (H)     homozygous     Mass     URETHRAl mass     PE (pulmonary embolism)     was on birth control at time, hosp x 4-5 d      Past Surgical History:   Procedure Laterality Date    APPENDECTOMY OPEN CHILD       SECTION N/A 2018    Procedure:  SECTION;  Primary  Section;  Surgeon: Junie Hightower MD;  Location: [facility] L[facility]     SECTION N/A 2020    Procedure:  SECTION;  Surgeon: Olga Clemente MD;  Location: [facility] L+[facility]    CONIZATION   "8/8/2012    Procedure: CONIZATION;  Cold Knife [facility]   Choice anesthesia;  Surgeon: Doreen Fernandez MD;  Location: [facility] OR    CYSTOSCOPY, EXCISE URETHRAL CARUNCLE, COMBINED  7/24/2012    Procedure: COMBINED CYSTOSCOPY, EXCISE URETHRAL CARUNCLE;  Cystoscopy and Excision of Urethral Mass;  Surgeon: Rasheeda Salazar MD;  Location: [facility] OR    wisdom teeth       cholecalciferol (VITAMIN D3) 1000 units (25 mcg) capsule  rivaroxaban ANTICOAGULANT (XARELTO ANTICOAGULANT) 20 MG TABS tablet      Allergies   Allergen Reactions    Misoprostol      Tolerated multiple low doses miso induction for labor. However, one hour after delivery when she received 800 mcg miso, her skin turned blue in color and face pale and had tachycardia. No throat closure or difficulty breathing or chest pain. Quickly resolved with IV Benadryl. Patient satted just fine. Could receive again in the future; however, monitor for this side effect.     Family History  Family History   Problem Relation Age of Onset    Cardiovascular Paternal Grandmother         had aneurism    Blood Disease Sister         factor V    Blood Disease Brother         factor V  brother carry gene    Blood Disease Mother         factor V    Blood Disease Father         factor V    Circulatory Maternal Uncle         uncle with blood clots    Circulatory Paternal Uncle         uncle with blood clot     Social History   Social History     Tobacco Use    Smoking status: Never    Smokeless tobacco: Never   Substance Use Topics    Alcohol use: No     Alcohol/week: 0.0 standard drinks of alcohol     Comment: rare use if ever    Drug use: No         A medically appropriate review of systems was performed with pertinent positives and negatives noted in the HPI, and all other systems negative.    Physical Exam   BP: 121/79  Pulse: 101  Temp: 99.2  F (37.3  C)  Resp: 18  Height: 180.3 cm (5' 11\")  Weight: 101.6 kg (224 lb)  SpO2: 95 %    Physical Exam  Vitals and nursing note reviewed. "   Constitutional:       General: She is not in acute distress.     Appearance: She is well-developed.   HENT:      Head: Normocephalic and atraumatic.      Mouth/Throat:      Mouth: Mucous membranes are moist.   Eyes:      General: No scleral icterus.     Conjunctiva/sclera: Conjunctivae normal.      Pupils: Pupils are equal, round, and reactive to light.   Cardiovascular:      Rate and Rhythm: Normal rate and regular rhythm.      Heart sounds: Normal heart sounds.   Pulmonary:      Effort: Pulmonary effort is normal. No respiratory distress.      Breath sounds: Wheezing and rhonchi present.   Abdominal:      General: Abdomen is flat.   Musculoskeletal:      Cervical back: Neck supple.   Skin:     General: Skin is warm and dry.   Neurological:      General: No focal deficit present.      Mental Status: She is alert and oriented to person, place, and time.      Cranial Nerves: No cranial nerve deficit.   Psychiatric:         Mood and Affect: Mood normal.         Behavior: Behavior normal.         ED Course, Procedures, & Data     ED Course as of 12/18/23 1117   Mon Dec 18, 2023   1027 Comprehensive metabolic panel  Called and spoke with chemistry lab staci Melton 30 minutes      Procedures            Results for orders placed or performed during the hospital encounter of 12/18/23   XR Chest 2 Views     Status: None (Preliminary result)    Impression    RESIDENT PRELIMINARY INTERPRETATION  Impression: Consolidative opacities in the right midlung and left  lower lung, likely infectious in etiology.   Comprehensive metabolic panel     Status: Abnormal   Result Value Ref Range    Sodium 135 135 - 145 mmol/L    Potassium 4.6 3.4 - 5.3 mmol/L    Carbon Dioxide (CO2) 24 22 - 29 mmol/L    Anion Gap 12 7 - 15 mmol/L    Urea Nitrogen 6.1 6.0 - 20.0 mg/dL    Creatinine 0.58 0.51 - 0.95 mg/dL    GFR Estimate >90 >60 mL/min/1.73m2    Calcium 8.9 8.6 - 10.0 mg/dL    Chloride 99 98 - 107 mmol/L    Glucose 116 (H) 70 - 99 mg/dL     Alkaline Phosphatase      AST      ALT      Protein Total 7.6 6.4 - 8.3 g/dL    Albumin 3.3 (L) 3.5 - 5.2 g/dL    Bilirubin Total 0.8 <=1.2 mg/dL   Nt probnp inpatient (BNP)     Status: Normal   Result Value Ref Range    N terminal Pro BNP Inpatient 65 0 - 450 pg/mL   Symptomatic Influenza A/B, RSV, & SARS-CoV2 PCR (COVID-19) Nasopharyngeal     Status: Normal    Specimen: Nasopharyngeal; Swab   Result Value Ref Range    Influenza A PCR Negative Negative    Influenza B PCR Negative Negative    RSV PCR Negative Negative    SARS CoV2 PCR Negative Negative    Narrative    Testing was performed using the Xpert Xpress CoV2/Flu/RSV Assay on the Cepheid GeneXpert Instrument. This test should be ordered for the detection of SARS-CoV-2, influenza, and RSV viruses in individuals who meet clinical and/or epidemiological criteria. Test performance is unknown in asymptomatic patients. This test is for in vitro diagnostic use under the FDA EUA for laboratories certified under CLIA to perform high or moderate complexity testing. This test has not been FDA cleared or approved. A negative result does not rule out the presence of PCR inhibitors in the specimen or target RNA in concentration below the limit of detection for the assay. If only one viral target is positive but coinfection with multiple targets is suspected, the sample should be re-tested with another FDA cleared, approved, or authorized test, if coinfection would change clinical management. This test was validated by the [facility] [employer]. These laboratories are certified under the Clinical Laboratory Improvement Amendments of 1988 (CLIA-88) as qualified to perform high complexity laboratory testing.   CBC with platelets and differential     Status: Abnormal   Result Value Ref Range    WBC Count 17.0 (H) 4.0 - 11.0 10e3/uL    RBC Count 3.97 3.80 - 5.20 10e6/uL    Hemoglobin 12.1 11.7 - 15.7 g/dL    Hematocrit 37.4 35.0 - 47.0 %    MCV 94 78 - 100 fL    MCH  30.5 26.5 - 33.0 pg    MCHC 32.4 31.5 - 36.5 g/dL    RDW 12.7 10.0 - 15.0 %    Platelet Count 224 150 - 450 10e3/uL    % Neutrophils 81 %    % Lymphocytes 11 %    % Monocytes 7 %    % Eosinophils 0 %    % Basophils 0 %    % Immature Granulocytes 1 %    NRBCs per 100 WBC 0 <1 /100    Absolute Neutrophils 13.6 (H) 1.6 - 8.3 10e3/uL    Absolute Lymphocytes 1.9 0.8 - 5.3 10e3/uL    Absolute Monocytes 1.2 0.0 - 1.3 10e3/uL    Absolute Eosinophils 0.1 0.0 - 0.7 10e3/uL    Absolute Basophils 0.1 0.0 - 0.2 10e3/uL    Absolute Immature Granulocytes 0.2 <=0.4 10e3/uL    Absolute NRBCs 0.0 10e3/uL   Paden Draw     Status: None    Narrative    The following orders were created for panel order Paden Draw.  Procedure                               Abnormality         Status                     ---------                               -----------         ------                     Extra Blue Top Tube[[id number]]                              Final result               Extra Green Top (Lithium...[[id number]]                      Final result                 Please view results for these tests on the individual orders.   Extra Blue Top Tube     Status: None   Result Value Ref Range    Hold Specimen JIC    Extra Green Top (Lithium Heparin) ON ICE     Status: None   Result Value Ref Range    Hold Specimen JIC    CBC with platelets differential     Status: Abnormal    Narrative    The following orders were created for panel order CBC with platelets differential.  Procedure                               Abnormality         Status                     ---------                               -----------         ------                     CBC with platelets and d...[[id number]]  Abnormal            Final result                 Please view results for these tests on the individual orders.     Medications - No data to display  Labs Ordered and Resulted from Time of ED Arrival to Time of ED Departure   COMPREHENSIVE METABOLIC PANEL - Abnormal        Result Value    Sodium 135      Potassium 4.6      Carbon Dioxide (CO2) 24      Anion Gap 12      Urea Nitrogen 6.1      Creatinine 0.58      GFR Estimate >90      Calcium 8.9      Chloride 99      Glucose 116 (*)     Alkaline Phosphatase        AST        ALT        Protein Total 7.6      Albumin 3.3 (*)     Bilirubin Total 0.8     CBC WITH PLATELETS AND DIFFERENTIAL - Abnormal    WBC Count 17.0 (*)     RBC Count 3.97      Hemoglobin 12.1      Hematocrit 37.4      MCV 94      MCH 30.5      MCHC 32.4      RDW 12.7      Platelet Count 224      % Neutrophils 81      % Lymphocytes 11      % Monocytes 7      % Eosinophils 0      % Basophils 0      % Immature Granulocytes 1      NRBCs per 100 WBC 0      Absolute Neutrophils 13.6 (*)     Absolute Lymphocytes 1.9      Absolute Monocytes 1.2      Absolute Eosinophils 0.1      Absolute Basophils 0.1      Absolute Immature Granulocytes 0.2      Absolute NRBCs 0.0     NT PROBNP INPATIENT - Normal    N terminal Pro BNP Inpatient 65     INFLUENZA A/B, RSV, & SARS-COV2 PCR - Normal    Influenza A PCR Negative      Influenza B PCR Negative      RSV PCR Negative      SARS CoV2 PCR Negative       XR Chest 2 Views   Preliminary Result   RESIDENT PRELIMINARY INTERPRETATION   Impression: Consolidative opacities in the right midlung and left   lower lung, likely infectious in etiology.             Critical care was not performed.     Medical Decision Making  The patient's presentation was of moderate complexity (an undiagnosed new problem with uncertain diagnosis).    The patient's evaluation involved:  ordering and/or review of 3+ test(s) in this encounter (chest x-ray, COVID, influenza, CBC, COVID, metabolic panel)    The patient's management necessitated moderate risk (prescription drug management including medications given in the ED).    Assessment & Plan    43-year-old female presents with 1 week of productive cough and dyspnea.  She is not hypoxic has a low-grade fever heart  rate is 100 chest x-ray shows a bilateral pneumonia with negative COVID and influenza.  At this point I think she is appropriate for treatment at home I's transmitted prescription for azithromycin and albuterol inhaler to her pharmacy she should follow-up with a primary care provider if she is not doing well for any reason return to the emergency department for consideration of observation admission.    I have reviewed the nursing notes. I have reviewed the findings, diagnosis, plan and need for follow up with the patient.    New Prescriptions    No medications on file       Final diagnoses:   Pneumonia of both lungs due to infectious organism, unspecified part of lung       I, Jeana Mata, am serving as a trained medical scribe to document services personally performed by Kwadwo Weinberg MD based on the provider's statements to me on December 18, 2023.  This document has been checked and approved by the attending provider.    I, Kwadwo Weinberg MD, was physically present and have reviewed and verified the accuracy of this note documented by Jeana Mata, medical scribe.      Kwadwo Weinberg MD       [facility] EMERGENCY DEPARTMENT  12/18/2023     Kwadwo Weinberg MD  12/18/23 1113

## 2023-12-18 NOTE — TELEPHONE ENCOUNTER
"Reason for Disposition   [1] MODERATE difficulty breathing (e.g., speaks in phrases, SOB even at rest, pulse 100-120) AND [2] still present when not coughing    Additional Information   Negative: SEVERE difficulty breathing (e.g., struggling for each breath, speaks in single words)   Negative: Bluish (or gray) lips or face now   Negative: [1] Difficulty breathing AND [2] exposure to flames, smoke, or fumes   Negative: [1] Stridor AND [2] difficulty breathing   Negative: Sounds like a life-threatening emergency to the triager   Negative: [1] Previous asthma attacks AND [2] this feels like asthma attack   Negative: Dry cough (non-productive;  no sputum or minimal clear sputum)    Answer Assessment - Initial Assessment Questions  1. ONSET: \"When did the cough begin?\"       1 week ago  2. SEVERITY: \"How bad is the cough today?\"       severe  3. SPUTUM: \"Describe the color of your sputum\" (none, dry cough; clear, white, yellow, green)      Dayquil and Nyquil it was brown. Now bright yellowish color.  4. HEMOPTYSIS: \"Are you coughing up any blood?\" If so ask: \"How much?\" (flecks, streaks, tablespoons, etc.)        5. DIFFICULTY BREATHING: \"Are you having difficulty breathing?\" If Yes, ask: \"How bad is it?\" (e.g., mild, moderate, severe)     - MILD: No SOB at rest, mild SOB with walking, speaks normally in sentences, can lie down, no retractions, pulse < 100.     - MODERATE: SOB at rest, SOB with minimal exertion and prefers to sit, cannot lie down flat, speaks in phrases, mild retractions, audible wheezing, pulse 100-120.     - SEVERE: Very SOB at rest, speaks in single words, struggling to breathe, sitting hunched forward, retractions, pulse > 120       Moderate. Catch SOB with walking or coughing. Sitting in recliner at night.  6. FEVER: \"Do you have a fever?\" If Yes, ask: \"What is your temperature, how was it measured, and when did it start?\"      Low grade fever 99 most of the week. Last night 100.4 F. Forehead and " "oral electric.   7. CARDIAC HISTORY: \"Do you have any history of heart disease?\" (e.g., heart attack, congestive heart failure)       no  8. LUNG HISTORY: \"Do you have any history of lung disease?\"  (e.g., pulmonary embolus, asthma, emphysema)      no  9. PE RISK FACTORS: \"Do you have a history of blood clots?\" (or: recent major surgery, recent prolonged travel, bedridden)      History of blood clot in lungs a long time ago. Most recent was 2 years ago in stomach.   10. OTHER SYMPTOMS: \"Do you have any other symptoms?\" (e.g., runny nose, wheezing, chest pain)        Runny nose. Chests get sore with coughing.   11. PREGNANCY: \"Is there any chance you are pregnant?\" \"When was your last menstrual period?\"        Not pregnant. LMP started yesterday.   12. TRAVEL: \"Have you traveled out of the country in the last month?\" (e.g., travel history, exposures)        no    Protocols used: Cough - Acute Kijhwlcpyh-X-QF  Ashli Rodriguez RN    "

## 2023-12-18 NOTE — ED NOTES
2:55 PM I was contacted by the patient a few hours after she was discharged.  She reported she took the antibiotic and use the inhaler and is not feeling any better primarily the cough is not improved.  She would like something for this.  I did call a prescription in for Robitussin AC to her pharmacy she should use primarily at night and I also reiterated again that she can return to the ER if she is not doing well and feels that she needs to be observed overnight on the inpatient service.     Kwadwo Weinberg MD  12/18/23 0095

## 2023-12-18 NOTE — DISCHARGE INSTRUCTIONS
Start antibiotics as directed; the prescription was transmitted to your pharmacy  Your viral studies were negative, COVID, influenza, RSV  Follow-up with your primary care provider or return to the ER if you are not doing well

## 2023-12-27 NOTE — MR AVS SNAPSHOT
Benita POSEY Geovani   2/17/2017   Anticoagulation Therapy Visit    Description:  36 year old female   Provider:  Thais Weiss, RN   [facility]           INR as of 2/17/2017     Today's INR 2.40      Anticoagulation Summary as of 2/17/2017     INR goal 2.0-3.0   Today's INR 2.40   Full instructions 15 mg on Tue; 10 mg all other days   Next INR check 3/3/2017    Indications   Deep vein thrombosis (DVT) (H) [I82.409] [I82.409]  Long-term (current) use of anticoagulants [Z79.01] [Z79.01]  Homozygous Factor V Leiden mutation (H) (Resolved) [D68.52]         February 2017 Details    Sun Mon Tue Wed Thu Fri Sat        1               2               3               4                 5               6               7               8               9               10               11                 12               13               14               15               16               17      10 mg   See details      18      10 mg           19      10 mg         20      10 mg         21      15 mg         22      10 mg         23      10 mg         24      10 mg         25      10 mg           26      10 mg         27      10 mg         28      15 mg              Date Details   02/17 This INR check               How to take your warfarin dose     To take:  10 mg Take 2 of the 5 mg tablets.    To take:  15 mg Take 3 of the 5 mg tablets.           March 2017 Details    Sun Mon Tue Wed Thu Fri Sat        1      10 mg         2      10 mg         3            4                 5               6               7               8               9               10               11                 12               13               14               15               16               17               18                 19               20               21               22               23               24               25                 26               27               28               29               30          Procedure & Scheduling Requirements:     Procedure: Colonoscopy (58255)    Diagnosis:    Hx of Colon Polyps:  Z86.010      Anesthesia:  MAC  Duration of Procedure:  30 min  Prep:  Split Nulytely.   [place]  [facility] [place]  Type of Admit:  Day Surgery.  Orders: None            31                 Date Details   No additional details    Date of next INR:  3/3/2017         How to take your warfarin dose     To take:  10 mg Take 2 of the 5 mg tablets.

## 2024-02-04 ENCOUNTER — HEALTH MAINTENANCE LETTER (OUTPATIENT)
Age: 44
End: 2024-02-04

## 2024-04-03 DIAGNOSIS — K55.069 MESENTERIC VEIN THROMBOSIS (H): ICD-10-CM

## 2024-04-03 NOTE — TELEPHONE ENCOUNTER
Benita Olivo is [facility] for their history of unprovoked VTE and homozygous for Factor V Leiden.     They were last evaluated by our team on 4/10/23 with the plan to remain on long term anticoagulation and return to clinic in 1 year.    Patient is scheduled for follow-up visit with Dr. Garcia on 7/29/24.  Prescription updated and refills given to get her to appointment.     This message will not be routed to  for scheduling as she is already scheduled.      Corina Hartman RN, BSN, PCCN  Nurse Clinician    [facility]  [address] [place] [address], [address], [place] [address]   Office, direct: [phone]  Main office number: [phone]  Pronouns: She, her, hers

## 2024-06-23 ENCOUNTER — HEALTH MAINTENANCE LETTER (OUTPATIENT)
Age: 44
End: 2024-06-23

## 2024-07-29 ENCOUNTER — VIRTUAL VISIT (OUTPATIENT)
Dept: HEMATOLOGY | Facility: CLINIC | Age: 44
End: 2024-07-29
Attending: INTERNAL MEDICINE
Payer: COMMERCIAL

## 2024-07-29 VITALS — WEIGHT: 220 LBS | BODY MASS INDEX: 30.68 KG/M2

## 2024-07-29 DIAGNOSIS — K55.069 MESENTERIC VEIN THROMBOSIS (H): ICD-10-CM

## 2024-07-29 PROCEDURE — 99215 OFFICE O/P EST HI 40 MIN: CPT | Mod: 95 | Performed by: INTERNAL MEDICINE

## 2024-07-29 NOTE — PROGRESS NOTES
[facility]  [facility]  [facility] [address], [address], [place] [address]  Main: [phone], Fax: [phone]        Outpatient Clinic Visit  Date:  07/29/2024    NOTE:  This visit was conducted by video, with the patient's approval.  Patient location: Home  Provider location: Onsite      Benita Olivo is a 44-year-old woman with a history of recurrent and unprovoked venous thromboembolism who is homozygous for factor V Leiden.  We last saw her in April 2023.  Today's visit was to discuss long-term anticoagulation.    Her thrombosis history is as follows:  2005 -- Right upper extremity DVT and pulmonary emboli, relatively unprovoked, had been on oral estrogen x 2 years prior, subsequently discontinued.   2006 -- Diagnosed with homozygous factor V Leiden mutation, recommended consideration of long term anticoagulation, but did not remain on anticoagulation  2012 -- Unprovoked left upper extremity DVT, resumed anticoagulation (warfarin)  2021 -- Superior mesenteric vein thrombosis (see details below)     Since resuming long-term anticoagulation in 2012 with warfarin, she struggled with consistent adherence.  She transitioned to enoxaparin during pregnancies.  Following her last delivery in January 2020, she remained on enoxaparin for breast-feeding but also struggled with adherence to this.    She presented in July 2021 with abdominal pain for a few days.  She had been off anticoagulation for approximately 1 year.  CT scan showed a superior mesenteric vein thrombosis.  She was started on rivaroxaban and her symptoms improved quickly.  She has had no recurrent abdominal symptoms since that time.    Interval history:  Since her last visit, Benita has remained well.  She continues to tolerate rivaroxaban without any difficulty.  She has been consistently taking the medication at the same time each day.  She denies any bleeding issues, including difficulty with  menstrual bleeding.  Her overall health has remained good, and she does not anticipate any surgical or invasive procedures in the coming year.      Physical exam:   She looks well.  Detailed exam not performed today (video visit).    Labs:   No new labs were obtained as part of today's visit.  Most recent labs are from December 2023 when she was seen in the emergency department and diagnosed with pneumonia.  Serum creatinine was normal at that time.  White count was elevated at 17, but the rest of her CBC was normal, with a hemoglobin 12.1, and platelets of 224,000.      ASSESSMENT / PLAN:  1.  Recurrent / unprovoked venous thromboembolism  2.  Homozygous factor V Leiden    Benita has a long history of recurrent and unprovoked venous thromboembolism beginning at age 25.  Most recently, she presented in July 2021 with a superior mesenteric vein thrombosis.  She had been off anticoagulation for about a year prior to that.    She clearly needs long-term anticoagulation and has been doing well since starting rivaroxaban in 2021.  She understands the importance of strict adherence to daily dosing, and the 24-hour dosing interval.  She continues to tolerate long-term anticoagulation without bleeding issues.    We will continue to see her annually, sooner with new issues or concerns.    Total time on date of encounter 40 minutes, including review of medical records and labs, video visit, and documentation.        Reed Garcia MD  Professor of Medicine  Division of Hematology, Oncology, and Transplantation  Director, [facility]

## 2024-12-11 NOTE — LETTER
4/3/2018       RE: Benita Olivo  [address] [place], [place]. \[facility].\"" [id number]     Dear Colleague,    Thank you for referring your patient, Benita Olivo, to the [facility] at [facility]. Please see a copy of my visit note below.    37 year old female, , presents at 28 1/7 weeks for obstetric ultrasound assessment indicated by velamentous cord insertion.    Single fetus    Presentation cephalic    USEGA = 30 0/7 weeks.  EFW = 1512 grams, 96 % for 28 weeks.      PRAMOD = 17.1cm.  FHR = 146bpm     Placenta anterior and grade 1      Findings discussed with patient.    Further studies with repeat growth in 4 weeks.        RACHELL Orourke MD        Again, thank you for allowing me to participate in the care of your patient.      Sincerely,    [facility]       Chief Complaint   Patient presents with    UTI     Pt states that she has been having symptoms for about 2-3 days ago   Pain on the lower belly area  Bleeding in urine   Discharge yellow/brownish   Pt did mention that she is breast feeding and wants to be sure it is ok if she is on antibiotics        History of Present Illness  The patient is a 23-year-old female who presents today with concerns of bladder symptoms.    She has been experiencing bladder discomfort for the past 2 to 3 days, characterized by pain and a mild burning sensation. She also reports an episode of spotting blood during urination today. She has not previously been prescribed Bactrim. She is currently breastfeeding her 5-month-old child. Approximately 1 to 2 months ago, she sought emergency care due to similar symptoms and was diagnosed with a urinary tract infection (UTI).     SOCIAL HISTORY  She does not smoke. She drinks alcohol once every two or three weeks. She works as a .    ALLERGIES  She is allergic to ARIPIPRAZOLE.      PAST MEDICAL HISTORY, PAST SURGICAL HISTORY, SOCIAL HISTORY, MEDICATIONS, AND ALLERGIES:  Reviewed per electronic chart.    REVIEW OF SYSTEMS:   Constitutional:  Denies fever or chills   Eyes:  Denies change in visual acuity   Gastrointestinal:  Denies abdominal pain, nausea, vomiting, bloody stools or diarrhea   Genitourinary:  Per History of Present Illness  Musculoskeletal:  Denies back pain or joint pain   Integument:  Denies rash     PHYSICAL EXAM:  Vitals:   Vitals:    12/11/24 1604   BP: (!) 133/97   Pulse: 88   Resp: 16   Temp: 98.4 °F (36.9 °C)   SpO2: 100%   Weight: 92.5 kg (204 lb)   Height: 5' 7\" (1.702 m)   LMP: 09/20/2024      Constitutional:  No acute distress, nontoxic appearance.  HENT: Normocephalic, atraumatic. Bilateral external ears normal. Oropharynx moist. No oral exudates. Nose normal.  Neck:  Normal range of motion. No tenderness. Supple. No lymphadenopathy. No stridor.  Cardiovascular:   Normal heart rate. Normal rhythm. No murmurs. No rubs. No gallops.  Pulmonary/Chest:  Normal breath sounds. No respiratory distress. No wheezing. No chest tenderness.  Abdomen:  Bowel sounds normal. Soft. No tenderness. No masses. No pulsatile masses. No CVA (costovertebral angle) tenderness.      Labs/Radiology:  Orders Placed This Encounter    POCT Urine Dip Auto    Urine, Bacterial Culture    sulfamethoxazole-trimethoprim (BACTRIM DS) 800-160 MG per tablet       Results  Laboratory Studies  Urine test indicates early bladder infection.        Assessment & Plan  1. Urinary Tract Infection (UTI).  She reports experiencing bladder symptoms, including pain and burning, for the past 2 to 3 days. She also noticed a spot of blood when wiping. A urine test confirmed an early bladder infection. She is advised to drink plenty of water,  She should empty her bladder before and after intercourse and avoid wearing tight clothing. She is also advised not to hold her urine for extended periods to prevent further bladder infections. A prescription for Bactrim has been sent to [facility] pharmacy. A urine culture will be sent to identify the specific organism causing the infection. If the culture results indicate a different organism, she will be notified, and the treatment plan may be adjusted accordingly.      Followup with primary if no improvement of symptoms or worsening. Patient acknowledged understanding of the instructions.

## 2025-07-12 ENCOUNTER — HEALTH MAINTENANCE LETTER (OUTPATIENT)
Age: 45
End: 2025-07-12

## 2025-08-05 DIAGNOSIS — K55.069 MESENTERIC VEIN THROMBOSIS: ICD-10-CM

## 2025-08-13 ENCOUNTER — DOCUMENTATION ONLY (OUTPATIENT)
Dept: ANTICOAGULATION | Facility: CLINIC | Age: 45
End: 2025-08-13
Payer: COMMERCIAL

## (undated) DEVICE — SOL WATER IRRIG 1000ML BOTTLE 07139-09

## (undated) DEVICE — SOL ADH LIQUID BENZOIN SWAB 0.6ML C1544

## (undated) DEVICE — GLOVE ESTEEM POWDER FREE SMT 6.5  2D72PT65

## (undated) DEVICE — SU MONOCRYL 4-0 PS-2 18" UND Y496G

## (undated) DEVICE — DRSG STERI STRIP 1/4X3" R1541

## (undated) DEVICE — GLOVE PROTEXIS BLUE W/NEU-THERA 7.0  2D73EB70

## (undated) DEVICE — DRAPE SETUP C-SECTION INVISISHIELD 54X90" DYNJE5600

## (undated) DEVICE — CATH TRAY FOLEY 16FR BARDEX W/DRAIN BAG STATLOCK 300316A

## (undated) DEVICE — SU MONOCRYL 0 CT-1 36" Y346H

## (undated) DEVICE — STRAP KNEE/BODY 31143004

## (undated) DEVICE — SU VICRYL 3-0 CTX 36" UND J980H

## (undated) DEVICE — PACK C-SECTION LF PL15OTA83B

## (undated) DEVICE — BASIN SET MAJOR

## (undated) DEVICE — SUCTION CANISTER MEDIVAC LINER 1500ML W/LID 65651-515

## (undated) DEVICE — SOL NACL 0.9% IRRIG 1000ML BOTTLE 07138-09

## (undated) DEVICE — SU VICRYL 0 CT-1 36" J346H

## (undated) DEVICE — CATH TRAY FOLEY 16FR SILICONE 907416

## (undated) DEVICE — BNDG ABDOMINAL BINDER 10X26-50" 08140145

## (undated) DEVICE — BARRIER SEPRAFILM 5X6" SINGLE SHEET 4301-02

## (undated) DEVICE — ESU GROUND PAD UNIVERSAL W/O CORD

## (undated) DEVICE — SU PLAIN 2-0 CT 27" 853H

## (undated) DEVICE — STOCKING SLEEVE COMPRESSION CALF LG

## (undated) DEVICE — PREP CHLORAPREP 26ML TINTED ORANGE  260815

## (undated) RX ORDER — KETOROLAC TROMETHAMINE 30 MG/ML
INJECTION, SOLUTION INTRAMUSCULAR; INTRAVENOUS
Status: DISPENSED
Start: 2018-06-18

## (undated) RX ORDER — KETOROLAC TROMETHAMINE 30 MG/ML
INJECTION, SOLUTION INTRAMUSCULAR; INTRAVENOUS
Status: DISPENSED
Start: 2020-01-13

## (undated) RX ORDER — PHENYLEPHRINE HCL IN 0.9% NACL 1 MG/10 ML
SYRINGE (ML) INTRAVENOUS
Status: DISPENSED
Start: 2020-01-13

## (undated) RX ORDER — OXYTOCIN/0.9 % SODIUM CHLORIDE 30/500 ML
PLASTIC BAG, INJECTION (ML) INTRAVENOUS
Status: DISPENSED
Start: 2020-01-13

## (undated) RX ORDER — FENTANYL CITRATE 50 UG/ML
INJECTION, SOLUTION INTRAMUSCULAR; INTRAVENOUS
Status: DISPENSED
Start: 2018-06-18

## (undated) RX ORDER — MORPHINE SULFATE 1 MG/ML
INJECTION, SOLUTION EPIDURAL; INTRATHECAL; INTRAVENOUS
Status: DISPENSED
Start: 2020-01-13

## (undated) RX ORDER — PHENYLEPHRINE HCL IN 0.9% NACL 1 MG/10 ML
SYRINGE (ML) INTRAVENOUS
Status: DISPENSED
Start: 2018-06-18

## (undated) RX ORDER — FENTANYL CITRATE 50 UG/ML
INJECTION, SOLUTION INTRAMUSCULAR; INTRAVENOUS
Status: DISPENSED
Start: 2020-01-13